# Patient Record
Sex: FEMALE | Race: WHITE | Employment: FULL TIME | URBAN - METROPOLITAN AREA
[De-identification: names, ages, dates, MRNs, and addresses within clinical notes are randomized per-mention and may not be internally consistent; named-entity substitution may affect disease eponyms.]

---

## 2017-06-02 RX ORDER — TRIAMCINOLONE ACETONIDE 1 MG/G
CREAM TOPICAL
Qty: 15 G | Refills: 0 | Status: SHIPPED | OUTPATIENT
Start: 2017-06-02 | End: 2017-09-13 | Stop reason: ALTCHOICE

## 2017-09-13 ENCOUNTER — OFFICE VISIT (OUTPATIENT)
Dept: INTERNAL MEDICINE CLINIC | Age: 47
End: 2017-09-13

## 2017-09-13 VITALS
HEART RATE: 95 BPM | SYSTOLIC BLOOD PRESSURE: 126 MMHG | DIASTOLIC BLOOD PRESSURE: 72 MMHG | OXYGEN SATURATION: 99 % | RESPIRATION RATE: 16 BRPM | HEIGHT: 66 IN | WEIGHT: 223 LBS | BODY MASS INDEX: 35.84 KG/M2

## 2017-09-13 DIAGNOSIS — L23.7 POISON IVY DERMATITIS: Primary | ICD-10-CM

## 2017-09-13 PROCEDURE — 99212 OFFICE O/P EST SF 10 MIN: CPT | Performed by: INTERNAL MEDICINE

## 2017-09-13 RX ORDER — BETAMETHASONE DIPROPIONATE 0.05 %
OINTMENT (GRAM) TOPICAL
Qty: 15 G | Refills: 0 | Status: SHIPPED | OUTPATIENT
Start: 2017-09-13 | End: 2018-05-31 | Stop reason: ALTCHOICE

## 2017-11-20 RX ORDER — TRIAMCINOLONE ACETONIDE 1 MG/G
CREAM TOPICAL
Qty: 15 G | Refills: 1 | Status: SHIPPED | OUTPATIENT
Start: 2017-11-20 | End: 2018-05-31 | Stop reason: SDUPTHER

## 2018-03-05 ENCOUNTER — HOSPITAL ENCOUNTER (OUTPATIENT)
Dept: WOMENS IMAGING | Age: 48
Discharge: HOME | End: 2018-03-05
Attending: INTERNAL MEDICINE | Admitting: INTERNAL MEDICINE

## 2018-03-05 DIAGNOSIS — Z12.31 VISIT FOR SCREENING MAMMOGRAM: ICD-10-CM

## 2018-05-31 ENCOUNTER — OFFICE VISIT (OUTPATIENT)
Dept: INTERNAL MEDICINE CLINIC | Age: 48
End: 2018-05-31

## 2018-05-31 VITALS
SYSTOLIC BLOOD PRESSURE: 116 MMHG | RESPIRATION RATE: 16 BRPM | WEIGHT: 233 LBS | HEIGHT: 66 IN | BODY MASS INDEX: 37.45 KG/M2 | DIASTOLIC BLOOD PRESSURE: 72 MMHG | HEART RATE: 80 BPM

## 2018-05-31 DIAGNOSIS — M79.632 LEFT FOREARM PAIN: ICD-10-CM

## 2018-05-31 DIAGNOSIS — M54.16 LUMBAR RADICULOPATHY: ICD-10-CM

## 2018-05-31 DIAGNOSIS — M25.512 ACUTE PAIN OF LEFT SHOULDER: Primary | ICD-10-CM

## 2018-05-31 PROCEDURE — 99213 OFFICE O/P EST LOW 20 MIN: CPT | Performed by: INTERNAL MEDICINE

## 2018-05-31 RX ORDER — TRIAMCINOLONE ACETONIDE 1 MG/G
CREAM TOPICAL
Qty: 15 G | Refills: 2 | Status: SHIPPED | OUTPATIENT
Start: 2018-05-31 | End: 2018-08-06 | Stop reason: SDUPTHER

## 2018-05-31 RX ORDER — MELOXICAM 15 MG/1
15 TABLET ORAL DAILY
Qty: 30 TABLET | Refills: 3 | Status: SHIPPED | OUTPATIENT
Start: 2018-05-31 | End: 2018-07-19 | Stop reason: ALTCHOICE

## 2018-05-31 ASSESSMENT — ENCOUNTER SYMPTOMS: BACK PAIN: 0

## 2018-06-05 ENCOUNTER — OFFICE VISIT (OUTPATIENT)
Dept: ORTHOPEDIC SURGERY | Age: 48
End: 2018-06-05

## 2018-06-05 VITALS — WEIGHT: 230 LBS | HEIGHT: 65 IN | BODY MASS INDEX: 38.32 KG/M2

## 2018-06-05 DIAGNOSIS — M19.012 DJD OF LEFT AC (ACROMIOCLAVICULAR) JOINT: ICD-10-CM

## 2018-06-05 DIAGNOSIS — M75.22 BICEPS TENDONITIS ON LEFT: ICD-10-CM

## 2018-06-05 DIAGNOSIS — M25.512 ACUTE PAIN OF LEFT SHOULDER: Primary | ICD-10-CM

## 2018-06-05 PROCEDURE — 99243 OFF/OP CNSLTJ NEW/EST LOW 30: CPT | Performed by: FAMILY MEDICINE

## 2018-06-05 PROCEDURE — 20550 NJX 1 TENDON SHEATH/LIGAMENT: CPT | Performed by: FAMILY MEDICINE

## 2018-06-05 RX ORDER — MELOXICAM 15 MG/1
15 TABLET ORAL DAILY
Qty: 30 TABLET | Refills: 3 | Status: SHIPPED | OUTPATIENT
Start: 2018-06-05 | End: 2018-12-19 | Stop reason: SDUPTHER

## 2018-06-12 ENCOUNTER — HOSPITAL ENCOUNTER (OUTPATIENT)
Dept: OTHER | Age: 48
Discharge: HOME | End: 2018-06-30
Attending: FAMILY MEDICINE | Admitting: FAMILY MEDICINE

## 2018-06-12 NOTE — PLAN OF CARE
[]Rheumatoid arthritis (M05.9)  []Osteoarthritis (M19.91)   Cardiovascular conditions   []Hypertension (I10)  []Hyperlipidemia (E78.5)  []Angina pectoris (I20)  []Atherosclerosis (I70)   Musculoskeletal conditions   []Disc pathology   []Congenital spine pathologies   []Prior surgical intervention  []Osteoporosis (M81.8)  []Osteopenia (M85.8)   Endocrine conditions   []Hypothyroid (E03.9)  []Hyperthyroid Gastrointestinal conditions   []Constipation (U92.64)   Metabolic conditions   []Morbid obesity (E66.01)  []Diabetes type 1(E10.65) or 2 (E11.65)   []Neuropathy (G60.9)     Pulmonary conditions   []Asthma (J45)  []Coughing   []COPD (J44.9)   Psychological Disorders  []Anxiety (F41.9)  []Depression (F32.9)   []Other:   []Other:          Barriers to/and or personal factors that will affect rehab potential:              []Age  []Sex              []Motivation/Lack of Motivation                        []Co-Morbidities              []Cognitive Function, education/learning barriers              []Environmental, home barriers              []profession/work barriers  []past PT/medical experience  []other:  Justification: Pt is a 50year old female with no personal factors that will affect rehab potential.     Falls Risk Assessment (30 days):  [x] Falls Risk assessed and no intervention required. [] Falls Risk assessed and Patient requires intervention due to being higher risk   TUG score (>12s at risk):     [] Falls education provided, including       G-Codes:  PT G-Codes  Functional Assessment Tool Used: UEFI  Score: 9% limitation  Functional Limitation: Carrying, moving and handling objects  Carrying, Moving and Handling Objects Current Status ():  At least 1 percent but less than 20 percent impaired, limited or restricted  Carrying, Moving and Handling Objects Goal Status (): 0 percent impaired, limited or restricted    ASSESSMENT:   Functional Impairments   []Noted spinal or UE joint hypomobility   []Noted spinal or UE joint hypermobility   [x]Decreased UE functional ROM   [x]Decreased UE functional strength   []Abnormal reflexes/sensation/myotomal/dermatomal deficits   [x]Decreased RC/scapular/core strength and neuromuscular control   []other:      Functional Activity Limitations (from functional questionnaire and intake)   []Reduced ability to tolerate prolonged functional positions   []Reduced ability or difficulty with changes of positions or transfers between positions   []Reduced ability to maintain good posture and demonstrate good body mechanics with sitting, bending, and lifting   [x] Reduced ability or tolerance with driving and/or computer work   [x]Reduced ability to sleep   [x]Reduced ability to perform lifting, reaching, carrying tasks   []Reduced ability to tolerate impact through UE   []Reduced ability to reach behind back   []Reduced ability to  or hold objects   []Reduced ability to throw or toss an object   []other:      Participation Restrictions   [x]Reduced participation in self care activities   [x]Reduced participation in home management activities   []Reduced participation in work activities   []Reduced participation in social activities. []Reduced participation in sport / recreational activities. Classification:   []Signs/symptoms consistent with post-surgical status including decreased ROM, strength and function.   []Signs/symptoms consistent with joint sprain/strain   []Signs/symptoms consistent with shoulder impingement   [x]Signs/symptoms consistent with shoulder/elbow/wrist tendinopathy   []Signs/symptoms consistent with Rotator cuff tear   []Signs/symptoms consistent with labral tear   []Signs/symptoms consistent with postural dysfunction    []Signs/symptoms consistent with Glenohumeral IR Deficit - <45 degrees   []Signs/symptoms consistent with facet dysfunction of cervical/thoracic spine    []Signs/symptoms consistent with pathology which may benefit from Dry needling []other:     Prognosis/Rehab Potential:      [x]Excellent   [x]Good    []Fair   []Poor    Tolerance of evaluation/treatment:    [x]Excellent   [x]Good    []Fair   []Poor    Physical Therapy Evaluation Complexity Justification  [x] A history of present problem with:  [x] no personal factors and/or comorbidities that impact the plan of care;  []1-2 personal factors and/or comorbidities that impact the plan of care  []3 personal factors and/or comorbidities that impact the plan of care  [x] An examination of body systems using standardized tests and measures addressing any of the following: body structures and functions (impairments), activity limitations, and/or participation restrictions;:  [] a total of 1-2 or more elements   [] a total of 3 or more elements   [x] a total of 4 or more elements   [x] A clinical presentation with:  [x] stable and/or uncomplicated characteristics   [] evolving clinical presentation with changing characteristics  [] unstable and unpredictable characteristics;   [x] Clinical decision making of [x] low, [] moderate, [] high complexity using standardized patient assessment instrument and/or measurable assessment of functional outcome. [x] EVAL (LOW) 63379 (typically 20 minutes face-to-face)  [] EVAL (MOD) 68689 (typically 30 minutes face-to-face)  [] EVAL (HIGH) 27937 (typically 45 minutes face-to-face)  [] RE-EVAL       PLAN:  Frequency/Duration:  1-2 days per week for 4-6 Weeks:  INTERVENTIONS:  [x] Therapeutic exercise including: strength training, ROM, for Upper extremity and core   [x]  NMR activation and proprioception for UE, scap and Core   [x] Manual therapy as indicated for shoulder, scapula and spine to include: Dry Needling/IASTM, STM, PROM, Gr I-IV mobilizations, manipulation.    [x] Modalities as needed that may include: thermal agents, E-stim, Biofeedback, US, iontophoresis as indicated  [x] Patient education on joint protection, postural re-education, activity

## 2018-06-12 NOTE — FLOWSHEET NOTE
Patient will demonstrate an increase in left shoulder strength (flex, ABD, and ER) to 4+/5 and scapular lower trap to 4+/5 to allow for proper functional mobility as indicated by patients Functional Deficits. 4. Patient will return to ADLs and sleeping without increased symptoms or restriction. Progression Towards Functional goals:  [] Patient is progressing as expected towards functional goals listed. [] Progression is slowed due to complexities listed. [] Progression has been slowed due to co-morbidities. [x] Plan just implemented, too soon to assess goals progression  [] Other:     ASSESSMENT:  See eval 6/12    Treatment/Activity Tolerance:  [x] Patient tolerated treatment well [] Patient limited by fatique  [] Patient limited by pain  [] Patient limited by other medical complications  [] Other: Pt tolerated the treatment well, was beginning to fatigue towards the end of today's exercises. The pt was eager to participate in therapy and cooperative throughout the tx. 6/12    Patient education: Patient education on PT and plan of care including diagnosis, prognosis, treatment goals and options. Patient agrees with discussed POC and treatment and is aware of rehab process.  6/12    Prognosis: [x] Good [] Fair  [] Poor    Patient Requires Follow-up: [x] Yes  [] No    PLAN: See eval 6/12  [] Continue per plan of care [] Alter current plan (see comments)  [x] Plan of care initiated [] Hold pending MD visit [] Discharge    Electronically signed by: Francia South PT, DPT

## 2018-06-14 ENCOUNTER — HOSPITAL ENCOUNTER (OUTPATIENT)
Dept: PHYSICAL THERAPY | Age: 48
Discharge: HOME | End: 2018-06-15
Admitting: FAMILY MEDICINE

## 2018-06-14 NOTE — FLOWSHEET NOTE
reaching, carrying, lifting, house/yardwork, driving/computer work  [] (12188) Reviewed/Progressed HEP activities related to improving balance, coordination, kinesthetic sense, posture, motor skill, proprioception of scapular, scapulothoracic and UE control with self care, reaching, carrying, lifting, house/yardwork, driving/computer work      Manual Treatments:  PROM / STM / Oscillations-Mobs:  G-I, II, III, IV (PA's, Inf., Post.)  [] ([id number]) Provided manual therapy to mobilize soft tissue/joints of cervical/CT, scapular GHJ and UE for the purpose of modulating pain, promoting relaxation,  increasing ROM, reducing/eliminating soft tissue swelling/inflammation/restriction, improving soft tissue extensibility and allowing for proper ROM for normal function with self care, reaching, carrying, lifting, house/yardwork, driving/computer work    Modalities: Ice to go 6/14    Charges:  Timed Code Treatment Minutes: 32'    Total Treatment Minutes: 31'       [] EVAL (LOW) 32067 (typically 20 minutes face-to-face)  [] EVAL (MOD) 52566 (typically 30 minutes face-to-face)  [] EVAL (HIGH) 68305 (typically 45 minutes face-to-face)  [] RE-EVAL     [x] UP(64018) x  1   [] IONTO  [] NMR (83875) x      [] VASO  [] Manual (25907) x       [] Other:  [x] TA x  1    [] Mech Traction (16839)  [] ES(attended) (08280)      [] ES (un) (84021):     GOALS:  Patient stated goal: reaching across body     Therapist goals for Patient:   Short Term Goals: To be achieved in: 2 weeks  1. Independent in HEP and progression per patient tolerance, in order to prevent re-injury. 2. Patient will have a decrease in pain to facilitate improvement in movement, function, and ADLs as indicated by Functional Deficits. Long Term Goals: To be achieved in: 4-6 weeks  1. Disability index score of 4% or less for the UEFS to assist with reaching prior level of function.    2. Patient will demonstrate increased left shoulder flex AROM to greater than or equal to

## 2018-06-18 ENCOUNTER — HOSPITAL ENCOUNTER (OUTPATIENT)
Dept: PHYSICAL THERAPY | Age: 48
Discharge: HOME | End: 2018-06-19
Admitting: FAMILY MEDICINE

## 2018-06-18 NOTE — FLOWSHEET NOTE
[address] [address]  Phone: [phone]   HDP: [phone]    Physical Therapy Daily Treatment Note  Date:  2018    Patient Name:  Caridad Jett    :  1970  MRN: [id number]  Restrictions/Precautions:    Medical/Treatment Diagnosis Information:  Diagnosis: Acute left shoulder pain - M25.512; Biceps tendonitis on left - M75.22; DJD of left AC (acromioclavicular) joint - M19.012  Treatment Diagnosis: Decreased ROM; Decreased strength;Decreased endurance; Insurance/Certification information:  PT Insurance Information: Diana  Physician Information:  Referring Practitioner: Dr. Kenzie Madison of care signed (Y/N):  Y    Date of Patient follow up with Physician:     G-Code (if applicable):      Date G-Code Applied:  18  PT G-Codes  Functional Assessment Tool Used: UEFI  Score: 9% limitation  Functional Limitation: Carrying, moving and handling objects  Carrying, Moving and Handling Objects Current Status (): At least 1 percent but less than 20 percent impaired, limited or restricted  Carrying, Moving and Handling Objects Goal Status (): 0 percent impaired, limited or restricted    Progress Note: []  Yes  [x]  No  Next due by: 10 July 2018      Latex Allergy:  [x]NO      []YES  Preferred Language for Healthcare:   [x]English       []other:    Visit # Insurance Allowable   3 30     Pain level:  0 /10     SUBJECTIVE:  Pt states she is feeling good. She states she has no pain today or over the weekend.       OBJECTIVE:   Observation:   Test measurements:      RESTRICTIONS/PRECAUTIONS: None     Exercises/Interventions:   Exercises:  Exercise/Equipment Resistance/Repetitions Other comments   Stretching/PROM     Wand 10 sec x 10 ER Added    Table Slides     UE [place]     Pulleys 10 sec x 10 flex/abd Added    Pendulum          Isometrics     Scapular Retraction 10 sec x 10 Added  driving/computer work  [] (37546) Reviewed/Progressed HEP activities related to improving balance, coordination, kinesthetic sense, posture, motor skill, proprioception of scapular, scapulothoracic and UE control with self care, reaching, carrying, lifting, house/yardwork, driving/computer work      Manual Treatments:  PROM / STM / Oscillations-Mobs:  G-I, II, III, IV (PA's, Inf., Post.)  [] ([id number]) Provided manual therapy to mobilize soft tissue/joints of cervical/CT, scapular GHJ and UE for the purpose of modulating pain, promoting relaxation,  increasing ROM, reducing/eliminating soft tissue swelling/inflammation/restriction, improving soft tissue extensibility and allowing for proper ROM for normal function with self care, reaching, carrying, lifting, house/yardwork, driving/computer work    Modalities: Ice to go 6/18    Charges:  Timed Code Treatment Minutes: 30'    Total Treatment Minutes: 28'         [] EVAL (LOW) 32434 (typically 20 minutes face-to-face)  [] EVAL (MOD) 33039 (typically 30 minutes face-to-face)  [] EVAL (HIGH) 73377 (typically 45 minutes face-to-face)  [] RE-EVAL     [x] UX(32894) x  1   [] IONTO  [x] NMR (01436) x  1   [] VASO  [] Manual (89989) x       [] Other:  [] TA x       [] Mech Traction (73812)  [] ES(attended) (14055)      [] ES (un) (96152):     GOALS:  Patient stated goal: reaching across body     Therapist goals for Patient:   Short Term Goals: To be achieved in: 2 weeks  1. Independent in HEP and progression per patient tolerance, in order to prevent re-injury. 2. Patient will have a decrease in pain to facilitate improvement in movement, function, and ADLs as indicated by Functional Deficits. Long Term Goals: To be achieved in: 4-6 weeks  1. Disability index score of 4% or less for the UEFS to assist with reaching prior level of function.    2. Patient will demonstrate increased left shoulder flex AROM to greater than or equal to 175 deg, ABD AROM to greater than or equal to 165 deg, and ER AROM to greater than or equal to 90 deg to allow for proper joint functioning as indicated by patients Functional Deficits. 3. Patient will demonstrate an increase in left shoulder strength (flex, ABD, and ER) to 4+/5 and scapular lower trap to 4+/5 to allow for proper functional mobility as indicated by patients Functional Deficits. 4. Patient will return to ADLs and sleeping without increased symptoms or restriction. Progression Towards Functional goals:  [] Patient is progressing as expected towards functional goals listed. [] Progression is slowed due to complexities listed. [] Progression has been slowed due to co-morbidities. [x] Plan just implemented, too soon to assess goals progression  [] Other:     ASSESSMENT:      Treatment/Activity Tolerance:  [x] Patient tolerated treatment well [] Patient limited by fatique  [] Patient limited by pain  [] Patient limited by other medical complications  [] Other: Pt tolerated the session well. She demonstrated increased strength with prone Ts, noted by increased resistance. She reported muscular fatigue with the addition of ball on the wall. 6/18    Patient education: Patient education on PT and plan of care including diagnosis, prognosis, treatment goals and options. Patient agrees with discussed POC and treatment and is aware of rehab process.  6/12    Prognosis: [x] Good [] Fair  [] Poor    Patient Requires Follow-up: [x] Yes  [] No    PLAN:   [x] Continue per plan of care [] Alter current plan (see comments)  [] Plan of care initiated [] Hold pending MD visit [] Discharge    Electronically signed by: Lynn Olivas PT, DPT

## 2018-06-21 ENCOUNTER — HOSPITAL ENCOUNTER (OUTPATIENT)
Dept: PHYSICAL THERAPY | Age: 48
Discharge: HOME | End: 2018-06-22
Admitting: FAMILY MEDICINE

## 2018-06-21 NOTE — FLOWSHEET NOTE
[address] [facility] [place] [address]  Phone: [phone]   TYP: [phone]    Physical Therapy Daily Treatment Note  Date:  2018    Patient Name:  Sudeep Valdes    :  1970  MRN: [id number]  Restrictions/Precautions:    Medical/Treatment Diagnosis Information:  Diagnosis: Acute left shoulder pain - M25.512; Biceps tendonitis on left - M75.22; DJD of left AC (acromioclavicular) joint - M19.012  Treatment Diagnosis: Decreased ROM; Decreased strength;Decreased endurance; Insurance/Certification information:  PT Insurance Information: Diana  Physician Information:  Referring Practitioner: Dr. Mercer Mood of care signed (Y/N):  Y    Date of Patient follow up with Physician:     G-Code (if applicable):      Date G-Code Applied:  18  PT G-Codes  Functional Assessment Tool Used: UEFI  Score: 9% limitation  Functional Limitation: Carrying, moving and handling objects  Carrying, Moving and Handling Objects Current Status ():  At least 1 percent but less than 20 percent impaired, limited or restricted  Carrying, Moving and Handling Objects Goal Status (): 0 percent impaired, limited or restricted    Progress Note: []  Yes  [x]  No  Next due by: 10 July 2018      Latex Allergy:  [x]NO      []YES  Preferred Language for Healthcare:   [x]English       []other:    Visit # Insurance Allowable   4 30     Pain level:  0 /10     SUBJECTIVE:  Pt states she was a little sore after last session, but is feeling good now.       OBJECTIVE:   Observation:   Test measurements:      RESTRICTIONS/PRECAUTIONS: None     Exercises/Interventions:   Exercises:  Exercise/Equipment Resistance/Repetitions Other comments   Bike 5'  Added    Stretching/PROM     Wand 10 sec x 10 ER Added    Table Slides     UE [place]     Pulleys 10 sec x 10 flex/abd Added    Pendulum          Isometrics     Scapular Retraction 10 sec x 10 with self care, reaching, carrying, lifting, house/yardwork, driving/computer work  [] (41286) Reviewed/Progressed HEP activities related to improving balance, coordination, kinesthetic sense, posture, motor skill, proprioception of scapular, scapulothoracic and UE control with self care, reaching, carrying, lifting, house/yardwork, driving/computer work      Manual Treatments:  PROM / STM / Oscillations-Mobs:  G-I, II, III, IV (PA's, Inf., Post.)  [] (28087) Provided manual therapy to mobilize soft tissue/joints of cervical/CT, scapular GHJ and UE for the purpose of modulating pain, promoting relaxation,  increasing ROM, reducing/eliminating soft tissue swelling/inflammation/restriction, improving soft tissue extensibility and allowing for proper ROM for normal function with self care, reaching, carrying, lifting, house/yardwork, driving/computer work    Modalities:CP - 15'  6/21    Charges:  Timed Code Treatment Minutes: 40'     Total Treatment Minutes: 59'        [] EVAL (LOW) 23309 (typically 20 minutes face-to-face)  [] EVAL (MOD) 39591 (typically 30 minutes face-to-face)  [] EVAL (HIGH) 16835 (typically 45 minutes face-to-face)  [] RE-EVAL     [x] EQ(70091) x  1   [] IONTO  [x] NMR (01322) x  1   [] VASO  [] Manual ([id number]) x       [] Other:  [x] TA x  1    [] Mech Traction (85983)  [] ES(attended) (09128)      [] ES (un) (49685):     GOALS:  Patient stated goal: reaching across body     Therapist goals for Patient:   Short Term Goals: To be achieved in: 2 weeks  1. Independent in HEP and progression per patient tolerance, in order to prevent re-injury. 2. Patient will have a decrease in pain to facilitate improvement in movement, function, and ADLs as indicated by Functional Deficits. Long Term Goals: To be achieved in: 4-6 weeks  1. Disability index score of 4% or less for the UEFS to assist with reaching prior level of function.    2. Patient will demonstrate increased left shoulder flex AROM to greater

## 2018-06-26 ENCOUNTER — OFFICE VISIT (OUTPATIENT)
Dept: ORTHOPEDIC SURGERY | Age: 48
End: 2018-06-26

## 2018-06-26 VITALS
DIASTOLIC BLOOD PRESSURE: 83 MMHG | BODY MASS INDEX: 39.27 KG/M2 | SYSTOLIC BLOOD PRESSURE: 131 MMHG | WEIGHT: 230 LBS | HEIGHT: 64 IN | HEART RATE: 76 BPM

## 2018-06-26 DIAGNOSIS — M75.22 BICEPS TENDONITIS ON LEFT: ICD-10-CM

## 2018-06-26 DIAGNOSIS — M25.512 ACUTE PAIN OF LEFT SHOULDER: ICD-10-CM

## 2018-06-26 DIAGNOSIS — M19.012 DJD OF LEFT AC (ACROMIOCLAVICULAR) JOINT: Primary | ICD-10-CM

## 2018-06-26 PROCEDURE — 99213 OFFICE O/P EST LOW 20 MIN: CPT | Performed by: FAMILY MEDICINE

## 2018-06-27 ENCOUNTER — HOSPITAL ENCOUNTER (OUTPATIENT)
Dept: PHYSICAL THERAPY | Age: 48
Discharge: HOME | End: 2018-06-28
Admitting: FAMILY MEDICINE

## 2018-06-27 NOTE — FLOWSHEET NOTE
[address] [address]  Phone: [phone]   VDG: [phone]    Physical Therapy Daily Treatment Note  Date:  2018    Patient Name:  Erinn Cardenas    :  1970  MRN: [id number]  Restrictions/Precautions:    Medical/Treatment Diagnosis Information:  Diagnosis: Acute left shoulder pain - M25.512; Biceps tendonitis on left - M75.22; DJD of left AC (acromioclavicular) joint - M19.012  Treatment Diagnosis: Decreased ROM; Decreased strength;Decreased endurance; Insurance/Certification information:  PT Insurance Information: Diana  Physician Information:  Referring Practitioner: Dr. Olivia Hull of care signed (Y/N):  Y    Date of Patient follow up with Physician:     G-Code (if applicable):      Date G-Code Applied:  18  PT G-Codes  Functional Assessment Tool Used: UEFI  Score: 9% limitation  Functional Limitation: Carrying, moving and handling objects  Carrying, Moving and Handling Objects Current Status (): At least 1 percent but less than 20 percent impaired, limited or restricted  Carrying, Moving and Handling Objects Goal Status (): 0 percent impaired, limited or restricted    Progress Note: []  Yes  [x]  No  Next due by: 10 July 2018      Latex Allergy:  [x]NO      []YES  Preferred Language for Healthcare:   [x]English       []other:    Visit # Insurance Allowable   5 30     Pain level:  0 /10     SUBJECTIVE:  Pt states she has no pain. HEP is going well.  She saw the MD yesterday, who recommended a few more PT visits and then D/C to HEP.       OBJECTIVE:   Observation:   Test measurements:      RESTRICTIONS/PRECAUTIONS: None     Exercises/Interventions:   Exercises:  Exercise/Equipment Resistance/Repetitions Other comments   Bike 5'  Added    Stretching/PROM     Wand 10 sec x 10 ER Added    Table Slides     UE [place]     Pulleys 10 sec x 10 flex/abd Added    Pendulum endurance, ROM of scapular, scapulothoracic and UE control with self care, reaching, carrying, lifting, house/yardwork, driving/computer work  [] (25680) Reviewed/Progressed HEP activities related to improving balance, coordination, kinesthetic sense, posture, motor skill, proprioception of scapular, scapulothoracic and UE control with self care, reaching, carrying, lifting, house/yardwork, driving/computer work      Manual Treatments:  PROM / STM / Oscillations-Mobs:  G-I, II, III, IV (PA's, Inf., Post.)  [] (85425) Provided manual therapy to mobilize soft tissue/joints of cervical/CT, scapular GHJ and UE for the purpose of modulating pain, promoting relaxation,  increasing ROM, reducing/eliminating soft tissue swelling/inflammation/restriction, improving soft tissue extensibility and allowing for proper ROM for normal function with self care, reaching, carrying, lifting, house/yardwork, driving/computer work    Modalities:CP - 15'  6/27    Charges:  Timed Code Treatment Minutes: 45'    Total Treatment Minutes: 75'       [] EVAL (LOW) 41868 (typically 20 minutes face-to-face)  [] EVAL (MOD) 76883 (typically 30 minutes face-to-face)  [] EVAL (HIGH) 423 8935 (typically 45 minutes face-to-face)  [] RE-EVAL     [x] ZP(63004) x  1   [] IONTO  [x] NMR (44442) x  1   [] VASO  [] Manual ([id number]) x       [] Other:  [x] TA x  1    [] [facility] T[facility] (14159)  [] ES(attended) (07506)      [] ES (un) (33258):     GOALS:  Patient stated goal: reaching across body     Therapist goals for Patient:   Short Term Goals: To be achieved in: 2 weeks  1. Independent in HEP and progression per patient tolerance, in order to prevent re-injury. 2. Patient will have a decrease in pain to facilitate improvement in movement, function, and ADLs as indicated by Functional Deficits. Long Term Goals: To be achieved in: 4-6 weeks  1. Disability index score of 4% or less for the UEFS to assist with reaching prior level of function.    2. Patient will demonstrate increased left shoulder flex AROM to greater than or equal to 175 deg, ABD AROM to greater than or equal to 165 deg, and ER AROM to greater than or equal to 90 deg to allow for proper joint functioning as indicated by patients Functional Deficits. 3. Patient will demonstrate an increase in left shoulder strength (flex, ABD, and ER) to 4+/5 and scapular lower trap to 4+/5 to allow for proper functional mobility as indicated by patients Functional Deficits. 4. Patient will return to ADLs and sleeping without increased symptoms or restriction. Progression Towards Functional goals:  [x] Patient is progressing as expected towards functional goals listed. [] Progression is slowed due to complexities listed. [] Progression has been slowed due to co-morbidities. [] Plan just implemented, too soon to assess goals progression  [] Other:     ASSESSMENT:      Treatment/Activity Tolerance:  [x] Patient tolerated treatment well [] Patient limited by fatique  [] Patient limited by pain  [] Patient limited by other medical complications  [] Other: Pt tolerated the session well, reporting increased muscular fatigue. She genie the addition of resisted PNF pattern pain free. 6/27    Patient education: Patient education on PT and plan of care including diagnosis, prognosis, treatment goals and options. Patient agrees with discussed POC and treatment and is aware of rehab process.  6/12    Prognosis: [x] Good [] Fair  [] Poor    Patient Requires Follow-up: [x] Yes  [] No    PLAN:   [x] Continue per plan of care [] Alter current plan (see comments)  [] Plan of care initiated [] Hold pending MD visit [] Discharge    Electronically signed by: Lg Easton PT, DPT

## 2018-07-01 ENCOUNTER — HOSPITAL ENCOUNTER (OUTPATIENT)
Dept: OTHER | Age: 48
Discharge: HOME | End: 2018-07-31
Attending: FAMILY MEDICINE | Admitting: FAMILY MEDICINE

## 2018-07-03 ENCOUNTER — HOSPITAL ENCOUNTER (OUTPATIENT)
Dept: PHYSICAL THERAPY | Age: 48
Discharge: HOME | End: 2018-07-04
Admitting: FAMILY MEDICINE

## 2018-07-11 ENCOUNTER — HOSPITAL ENCOUNTER (OUTPATIENT)
Dept: PHYSICAL THERAPY | Age: 48
Discharge: HOME | End: 2018-07-12
Admitting: FAMILY MEDICINE

## 2018-07-11 NOTE — DISCHARGE SUMMARY
M19.012  Treatment Diagnosis: Decreased ROM; Decreased strength;Decreased endurance; Insurance/Certification information:  PT Insurance Information: Diana  Physician Information:  Referring Practitioner: Dr. Pam Liz of care signed (Y/N):  Y    Date of Patient follow up with Physician: 6/26    G-Code (if applicable):      Date G-Code Applied:  7/11/18  PT G-Codes  Functional Assessment Tool Used: UEFi  Score: 0% limitation  Functional Limitation: Carrying, moving and handling objects  Carrying, Moving and Handling Objects Current Status (): 0 percent impaired, limited or restricted  Carrying, Moving and Handling Objects Goal Status (): 0 percent impaired, limited or restricted  Carrying, Moving and Handling Objects Discharge Status (): 0 percent impaired, limited or restricted     Progress Note: [x]  Yes  []  No    Latex Allergy:  [x]NO      []YES  Preferred Language for Healthcare:   [x]English       []other:    Visit # Insurance Allowable   7 30     Pain level: 0 /10 7/11    SUBJECTIVE:  Pt states she has no shoulder pain. No problems at home. She states she was able to vacuum yesterday pain free. She states getting dressed is no longer a problem. Pushing up from a chair is no longer painful.      7/11    OBJECTIVE: 7/11  Observation:   Test measurements:            ROM AROM  Comment     L R     Flexion 178 176     Abduction 178 165     ER 90 105     IR 75 85     Other(cervical)                      Strength L R Comment   Flexion 4+ 4+     Abduction 4+ 4+     ER 4 5     IR 5 5     Upper Trap 5 5     Lower Trap 4 4     Mid Trap 4+ 4+     Rhomboids 4+ 4+     Biceps 5 5     Triceps 5 5       Palpation: no TTP      Posture: rounded shoulders        RESTRICTIONS/PRECAUTIONS: None     Exercises/Interventions:   Exercises:  Exercise/Equipment Resistance/Repetitions Other comments   Bike 5'  Added 6/21   Stretching/PROM     Wand Table Slides UE [place] Pulleys Pendulum          Isometrics     Scapular

## 2018-07-19 ENCOUNTER — OFFICE VISIT (OUTPATIENT)
Dept: ORTHOPEDIC SURGERY | Age: 48
End: 2018-07-19

## 2018-07-19 VITALS
WEIGHT: 235 LBS | BODY MASS INDEX: 39.15 KG/M2 | SYSTOLIC BLOOD PRESSURE: 139 MMHG | HEART RATE: 87 BPM | DIASTOLIC BLOOD PRESSURE: 82 MMHG | HEIGHT: 65 IN

## 2018-07-19 DIAGNOSIS — M25.562 LEFT KNEE PAIN, UNSPECIFIED CHRONICITY: Primary | ICD-10-CM

## 2018-07-19 DIAGNOSIS — M54.50 LUMBAR SPINE PAIN: ICD-10-CM

## 2018-07-19 DIAGNOSIS — M17.12 PRIMARY OSTEOARTHRITIS OF LEFT KNEE: ICD-10-CM

## 2018-07-19 DIAGNOSIS — M54.42 ACUTE LEFT-SIDED LOW BACK PAIN WITH LEFT-SIDED SCIATICA: ICD-10-CM

## 2018-07-19 DIAGNOSIS — M51.36 DDD (DEGENERATIVE DISC DISEASE), LUMBAR: ICD-10-CM

## 2018-07-19 DIAGNOSIS — M76.32 ILIOTIBIAL BAND SYNDROME OF LEFT SIDE: ICD-10-CM

## 2018-07-19 DIAGNOSIS — M22.42 CHONDROMALACIA PATELLAE OF LEFT KNEE: ICD-10-CM

## 2018-07-19 PROBLEM — M51.369 DDD (DEGENERATIVE DISC DISEASE), LUMBAR: Status: ACTIVE | Noted: 2018-07-19

## 2018-07-19 PROCEDURE — 99214 OFFICE O/P EST MOD 30 MIN: CPT | Performed by: FAMILY MEDICINE

## 2018-07-19 PROCEDURE — MISCD110 LATERAL STABILIZER: Performed by: FAMILY MEDICINE

## 2018-07-19 RX ORDER — METHYLPREDNISOLONE 4 MG/1
TABLET ORAL
Qty: 21 KIT | Refills: 0 | Status: SHIPPED | OUTPATIENT
Start: 2018-07-19 | End: 2018-08-16 | Stop reason: ALTCHOICE

## 2018-07-19 NOTE — PATIENT INSTRUCTIONS
If you're currently taking an anti-inflammatory such as advil, aleve, ibuprofen, diclofenac, naproxen, meloxicam, celebrex, or nabumetone, please stop. Take Medrol first for 6 days. This is a steroid pack. Flip the package over to the foil side and the directions will tell you to start with 6 pills the first day, 5 pills the second day, etc. Please do not take any other anti-inflammatories with the medrol dose frieda as this can upset your stomach. If something else is needed, you may take extra strength tylenol.      Once you are finished with the medrol, then you may re-start or start your anti-inflammatory: Meloxicam

## 2018-07-19 NOTE — PROGRESS NOTES
distribution. She denies neurogenic bowel or bladder symptoms. Seen today for orthopedic consultation with imaging. Medical History  Patient's medications, allergies, past medical, surgical, social and family histories were reviewed and updated as appropriate. Review of Systems  Relevant review of systems reviewed on 6/5/2018 and available in the patient's chart under the medial tab. Vital Signs  Vitals:    07/19/18 0923   BP: 139/82   Pulse: 87         General Exam:   Constitutional: Patient is adequately groomed with no evidence of malnutrition  DTRs: Deep tendon reflexes are intact  Mental Status: The patient is oriented to time, place and person. The patient's mood and affect are appropriate. Lymphatic: The lymphatic examination bilaterally reveals all areas to be without enlargement or induration. Vascular: Examination reveals no swelling or calf tenderness. Peripheral pulses are palpable and 2+. Neurological: The patient has good coordination. There is no weakness or sensory deficit. Shoulder Examination    Inspection:  There is no high-grade deformity swelling atrophy or palpable spasm. She does have some mild a.c. crepitation. Palpation:  She No longer has any pain at 0 out of 10 with palpation of the biceps tendon anteriorly involving the left shoulder. She is not exhibit any tenderness over the greater tuberosity and a.c. joint. She has no further posterior cuff discomfort. Rang of Motion:  She has reestablished nearly all of her shoulder motion both actively and passively. Strength:  She has improved strength at 4+ to 5 minus out of 5 with super and infraspinatus testing without pain. Subscap 5 out of 5. Special Tests:  Negative drop liftoff testing. No further pain with speed's testing and direct palpation of the biceps tendon. There does not appear to be of evidence of instability and she no longer has pain with impingement testing as well. deformity or injury. Range of motion is unremarkable. There is no gross instability. There are no rashes, ulcerations or lesions. Strength and tone are normal.  Left Upper Extremity: Examination of the left upper extremity does not show any tenderness, deformity or injury. Range of motion is unremarkable. There is no gross instability. There are no rashes, ulcerations or lesions. Strength and tone are normal.  Neck: Examination of the neck does not show any tenderness, deformity or injury. Range of motion is unremarkable. There is no gross instability. There are no rashes, ulcerations or lesions. Strength and tone are normal.         Diagnostic Test Findings:   AP and lateral lumbar spine films were obtained today and show some mild facet arthropathy with suggestion of L5-S1 degenerative disc disease. No high-grade spondylolisthesis or acute osseous injuries identified. Left knee AP and PA weight-bearing sunrise and lateral films were obtained today and do show fairly prominent tilt with underlying osteoarthritic changes to the patellofemoral joint. She doesn't have some mild medial compartment narrowing as well. Assessment:  #1.  3 months status post symptomatic mechanical back pain with lumbar myofascial pain and underlying lumbar degenerative disc disease. #2.  3 months status post symptomatic left knee distal IT band friction syndrome with underlying patellofemoral arthropathy and mild medial compartment osteoarthritis. #3.   markedly improved partially rehabilitated left shoulder posttraumatic biceps tendinitis with improved shoulder pain and a.c. arthropathy with low-grade impingement         Impression:    Encounter Diagnoses   Name Primary?     Left knee pain, unspecified chronicity Yes    Lumbar spine pain     DDD (degenerative disc disease), lumbar     Iliotibial band syndrome of left side     Primary osteoarthritis of left knee     Chondromalacia patellae of left knee     the next several weeks. I would like for her to utilize J brace that she is having some pseudo-buckling in her knee. She will attend physical therapy for her back as well as her left distal IT band. Icing and activity modification was discussed. Her shoulder is doing much better and she will continue her home-based exercises. We will see her back in 3-4 weeks and consider workup for radiculopathy if she is failing to improve but she will contact us in the interim with questions or concerns. Cc: Dr. Kathleen Caldwell        This dictation was performed with a verbal recognition program [facility] ) and it was checked for errors. It is possible that there are still dictated errors within this office note. If so, please bring any errors to my attention for an addendum. All efforts were made to ensure that this office note is accurate.

## 2018-07-23 ENCOUNTER — HOSPITAL ENCOUNTER (OUTPATIENT)
Dept: PHYSICAL THERAPY | Age: 48
Discharge: HOME | End: 2018-07-24
Admitting: FAMILY MEDICINE

## 2018-07-23 NOTE — PLAN OF CARE
away. She states her knee pain is constant. She went to see the MD, who gave her a brace and referred her to PT. CLOF: She states walking and prolonged sitting increases pain. She states laying on the left side increases pain, changing positions helps the hip, but not the knee pain. No pain with driving. She states stairs at home, doesn't increase pain. She states standing will occasionally increase pain. She states occasionally bringing the foot up to put a sock on increases pain.      Relevant Medical History: None   Functional Disability Index/G-Codes:  Modified Oswestry, FABQ    Pain Scale: 2/10 now, 7/10 at worst   Easing factors: brace   Provocative factors: listed above     Type: []Constant   []Intermittent  []Radiating []Localized []other:     Numbness/Tingling: down LLE     Occupation/School:  [employer]      Hobbies: none    Living Status/Prior Level of Function: Independent with ADLs and IADLs    OBJECTIVE:     Standing Exam Normal Abnormal N/A Comments   Toe walk   Normal       Heel Walk Normal      Side bending  Abnormal  Pain with LSB    Pelvic Height Normal       Fwd Bend Normal       Extension Normal       Trendelenburg Normal       SLS/SLS with rotation  Abnormal  Pain on LLE           Seated Exam Normal Abnormal N/A Comments   Pelvic Height Normal       Seated Rotation Normal       Seated flexion Normal       B hip IR Normal              Supine Exam Normal Abnormal N/A Comments   Hip flexion Normal       Abduction Normal       ER Normal       IR Normal       SHANT/Gerardo  Abnormal  Abnormal LLE   Hip scour Normal       SLR Normal       Crossed SLR Normal       SI distraction/compression       Thigh thrust Normal       Hamstring flexibility LLE - lacking 38 deg  RLE - lacking 36 deg    90/90 position + low back pain           Prone Exam Normal Abnormal N/A Comments   Prone hip IR Normal       PA/Spring  Abnormal  Pain L Lumbar    Prone Instability test       Sacral Spring/thrust Normal extension     []symptoms peripheralize with lumbar flexion     []directional preference for extension    []Lateral Shift     []visible frontal plane deviation     []directional preference for lateral translation   [x] \"Manipulation subgroup\"  (3/4 meets manipulation criteria)     []symptoms less than 16 days    [x]FABQ less than 19    [x]Hypomobility of lumbar spine    [x]IR of at least 1 hip >35 degrees    Other Factors to consider:    [x]no symptoms distal to the knee    [x]no red flags and minimal yellow flags    [x]no contraindications to manipulation   [] \"Traction subgroup\"      []signs and symptoms of nerve root compression (radiculopathy)     []unable to centralize distal symptoms with movement    []pain or numbness in the lumbar spine, buttock, and/or LE    []peripheralization with extension movements    []+ SLR or + crossed SLR (symptoms less than 70 degrees)  []Movement Control Approach   []\"Stabilization subgroup\"    []younger age (less than 40)     []greater general flexibility (post-partum, SLR >90)    []abberant movements, painful arc, or [place]'s sign with flex/ext ROM    []positive prone instability test  []Functional Optimization Approach   []\"unspecified subgroup\"    []lower disability scores     []lower fear avoidance scores     []longer duration of symptoms (chronic)    []prior episodes of low back pain    []older age                           [x] Patient history, allergies, meds reviewed. Medical chart reviewed. See intake form. Review Of Systems (ROS):  [x]Performed Review of systems (Integumentary, CardioPulmonary, Neurological) by intake and observation. Intake form has been scanned into medical record. Patient has been instructed to contact their primary care physician regarding ROS issues if not already being addressed at this time.       Co-morbidities/Complexities (which will affect course of rehabilitation):   [x]None           Arthritic conditions   []Rheumatoid arthritis hypomobility   []Noted lumbosacral and/or generalized hypermobility   []Decreased Lumbosacral/hip/LE functional ROM   [x]Decreased core/proximal hip strength and neuromuscular control    [x]Decreased LE functional strength    []Abnormal reflexes/sensation/myotomal/dermatomal deficits  []Reduced balance/proprioceptive control    []other:      Functional Activity Limitations (from functional questionnaire and intake)   [x]Reduced ability to tolerate prolonged functional positions   []Reduced ability or difficulty with changes of positions or transfers between positions   [x]Reduced ability to maintain good posture and demonstrate good body mechanics with sitting, bending, and lifting   [x]Reduced ability to sleep   [] Reduced ability or tolerance with driving and/or computer work   []Reduced ability to perform lifting, reaching, carrying tasks   [x]Reduced ability to squat   []Reduced ability to forward bend   [x]Reduced ability to ambulate prolonged functional periods/distances/surfaces   [x]Reduced ability to ascend/descend stairs   []other:       Participation Restrictions   [x]Reduced participation in self care activities   []Reduced participation in home management activities   []Reduced participation in work activities   []Reduced participation in social activities. []Reduced participation in sport/recreational activities. Classification:   [x]Signs/symptoms consistent with Lumbar instability/stabilization subgroup. [x]Signs/symptoms consistent with Lumbar mobilization/manipulation subgroup, myotomes and dermatomes intact. Meets manipulation criteria.     []Signs/symptoms consistent with Lumbar direction specific/centralization subgroup   []Signs/symptoms consistent with Lumbar traction subgroup     []Signs/symptoms consistent with lumbar facet dysfunction   []Signs/symptoms consistent with lumbar stenosis type dysfunction   []Signs/symptoms consistent with nerve root involvement including myotome & dermatome dysfunction   []Signs/symptoms consistent with post-surgical status including: decreased ROM, strength and function. []signs/symptoms consistent with pathology which may benefit from Dry needling     []other:      Prognosis/Rehab Potential:      [x]Excellent   [x]Good    []Fair   []Poor    Tolerance of evaluation/treatment:    [x]Excellent   [x]Good    []Fair   []Poor    Physical Therapy Evaluation Complexity Justification  [x] A history of present problem with:  [] no personal factors and/or comorbidities that impact the plan of care;  [x]1-2 personal factors and/or comorbidities that impact the plan of care  []3 personal factors and/or comorbidities that impact the plan of care  [x] An examination of body systems using standardized tests and measures addressing any of the following: body structures and functions (impairments), activity limitations, and/or participation restrictions;:  [] a total of 1-2 or more elements   [] a total of 3 or more elements   [x] a total of 4 or more elements   [x] A clinical presentation with:  [x] stable and/or uncomplicated characteristics   [] evolving clinical presentation with changing characteristics  [] unstable and unpredictable characteristics;   [x] Clinical decision making of [x] low, [] moderate, [] high complexity using standardized patient assessment instrument and/or measurable assessment of functional outcome.     [x] EVAL (LOW) 59375 (typically 20 minutes face-to-face)  [] EVAL (MOD) 44081 (typically 30 minutes face-to-face)  [] EVAL (HIGH) 67358 (typically 45 minutes face-to-face)  [] RE-EVAL         PLAN: Begin PT focusing on: proximal hip mobilizations, LB mobs, LB core activation, proximal hip activation, and HEP    Frequency/Duration: 1-2 days per week for 6 Weeks:  Interventions:  [x]  Therapeutic exercise including: strength training, ROM, for LE, Glutes and core   [x]  NMR activation and proprioception for glutes , LE and Core   [x]  Manual therapy

## 2018-07-25 ENCOUNTER — HOSPITAL ENCOUNTER (OUTPATIENT)
Dept: PHYSICAL THERAPY | Age: 48
Discharge: HOME | End: 2018-07-26
Admitting: FAMILY MEDICINE

## 2018-07-25 NOTE — FLOWSHEET NOTE
[address],  [address]  Phone: [phone]   GZD: [phone]      Physical Therapy Daily Treatment Note  Date:  2018    Patient Name:  Eduar Dunham    :  1970  MRN: [id number]  Restrictions/Precautions:    Medical/Treatment Diagnosis Information:  Diagnosis: Acute L sided low bakc pain - M54.42, Lumbar DDD - M51.36, Lumbar spine pain - M54.5; Iliotibial band syndrome of left side - M76.32; Left knee pain - M25.562  Treatment Diagnosis: Decreased ADL status; Decreased strength;Decreased endurance; Insurance/Certification information:  PT Insurance Information: Diana   Physician Information:  Referring Practitioner: Dr. Mu Jean Baptiste of care signed (Y/N): Y    Date of Patient follow up with Physician:     G-Code (if applicable):      Date G-Code Applied:  18  PT G-Codes  Functional Assessment Tool Used: Modified oswestry, FABQ  Score: Modified oswestry - 20%, FABQ W - 12, PA - 11  Functional Limitation: Changing and maintaining body position  Changing and Maintaining Body Position Current Status (): At least 1 percent but less than 20 percent impaired, limited or restricted  Changing and Maintaining Body Position Goal Status (): 0 percent impaired, limited or restricted    Progress Note: []  Yes  [x]  No  Next due by: 2018      Latex Allergy:  [x]NO      []YES  Preferred Language for Healthcare:   [x]English       []other:    Visit # Insurance Allowable   2 30 (7 used for LE)      Pain level:   10 - knee pain      SUBJECTIVE:  She states she has been sore from the HEP. She states she has a little knee pain while walking in. She states since wearing the knee brace, she has not had any numbness or tingling down the LLE. She states she was able to walk around [place] with no numbness and tingling, but did have increased knee pain.  However, she states she did not require too long of a rest break before it felt better. 7/25    OBJECTIVE:   Observation:   Test measurements:      RESTRICTIONS/PRECAUTIONS: none     Exercises/Interventions:   ROM/stretches     SKTC     DKTC     Prayer stretch     Supine HS 30 sec x 3 Added 7/23   Prone quad stretch 30 sec x 3 Added 7/23   Pelvic tilt     Hook lying rotation 10 x 3 Added 7/25   Cat and camel          Strengthening     TA activation 10 sec x 10  Added 7/23   Ta activation with marches 15 x 1 Added 7/25   Supine SLR 10 x 3 Added 7/23   Hip ABD SLR 10 x 3  Added 7/23   Hip ext SLR 10 x 3 Added 7/23   Bridges 10 x 3 Added 7/25        Quadruped alternate LE reaches     Quadruped alternate UE/LE reaches     [employer] heel raises     Sit ups      planks     Tband lat pulls     Tband rows       Manual Intervention             Prone PA      GISTM/STM      Lumbar Manip      SI Manip      Hip belt mobs      Hip LA distraction              Therapeutic Exercise and NMR EXR  [x] (84497) Provided verbal/tactile cueing for activities related to strengthening, flexibility, endurance, ROM  for improvements in proximal hip and core control with self care, mobility, lifting and ambulation.  [] (18158) Provided verbal/tactile cueing for activities related to improving balance, coordination, kinesthetic sense, posture, motor skill, proprioception  to assist with core control in self care, mobility, lifting, and ambulation.      Therapeutic Activities:    [] (98549 or 14635) Provided verbal/tactile cueing for activities related to improving balance, coordination, kinesthetic sense, posture, motor skill, proprioception and motor activation to allow for proper function  with self care and ADLs  [] (47426) Provided training and instruction to the patient for proper core and proximal hip recruitment and positioning with ambulation re-education     Home Exercise Program:    [x] (42392) Reviewed/Progressed HEP activities related to strengthening, flexibility, endurance, ROM of core, proximal hip and LE for functional self-care, mobility, lifting and ambulation   [] (37707) Reviewed/Progressed HEP activities related to improving balance, coordination, kinesthetic sense, posture, motor skill, proprioception of core, proximal hip and LE for self care, mobility, lifting, and ambulation      Manual Treatments:  PROM / STM / Oscillations-Mobs:  G-I, II, III, IV (PA's, Inf., Post.)  [x] (49631) Provided manual therapy to mobilize proximal hip and LS spine soft tissue/joints for the purpose of modulating pain, promoting relaxation,  increasing ROM, reducing/eliminating soft tissue swelling/inflammation/restriction, improving soft tissue extensibility and allowing for proper ROM for normal function with self care, mobility, lifting and ambulation. Manual - bilateral hamstring stretch, quad stretch, and hip flexor stretch; bilateral grade III hip mobilizations in neutral and hip ABD 30 deg - 10' 7/25     Modalities:   CP - 15' 7/23    Charges:  Timed Code Treatment Minutes: 50'    Total Treatment Minutes: 50'       [] EVAL (LOW) 70131 (typically 20 minutes face-to-face)  [] EVAL (MOD) 75913 (typically 30 minutes face-to-face)  [] EVAL (HIGH) 79548 (typically 45 minutes face-to-face)  [] RE-EVAL     [x] EW(31387) x  1   [] IONTO  [x] NMR (77492) x  1   [] VASO  [x] Manual (41929) x  1    [] Other:  [] TA x       [] Mech Traction (69095)  [] ES(attended) (47678)      [] ES (un) (44937):     Goals:  Patient stated goal: reduce pain in knee and cross legs to put socks and shoes on     Therapist goals for Patient:   Short Term Goals: To be achieved in: 2 weeks  1. Independent in HEP and progression per patient tolerance, in order to prevent re-injury. 2. Patient will have a decrease in pain to facilitate improvement in movement, function, and ADLs as indicated by Functional Deficits. Long Term Goals: To be achieved in: 6  weeks  1.  Disability index score of 10% or less for the

## 2018-07-30 ENCOUNTER — HOSPITAL ENCOUNTER (OUTPATIENT)
Dept: PHYSICAL THERAPY | Age: 48
Discharge: HOME | End: 2018-08-31
Admitting: FAMILY MEDICINE

## 2018-07-30 NOTE — FLOWSHEET NOTE
[address] [facility] [place] [address]  Phone: [phone]   D: [phone]      Physical Therapy Daily Treatment Note  Date:  2018    Patient Name:  Jacinta Almendarez    :  1970  MRN: [id number]  Restrictions/Precautions:    Medical/Treatment Diagnosis Information:  Diagnosis: Acute L sided low bakc pain - M54.42, Lumbar DDD - M51.36, Lumbar spine pain - M54.5; Iliotibial band syndrome of left side - M76.32; Left knee pain - M25.562  Treatment Diagnosis: Decreased ADL status; Decreased strength;Decreased endurance; Insurance/Certification information:  PT Insurance Information: Diana   Physician Information:  Referring Practitioner: Dr. Rock Caldera of care signed (Y/N): Y    Date of Patient follow up with Physician:     G-Code (if applicable):      Date G-Code Applied:  18  PT G-Codes  Functional Assessment Tool Used: Modified oswestry, FABQ  Score: Modified oswestry - 20%, FABQ W - 12, PA - 11  Functional Limitation: Changing and maintaining body position  Changing and Maintaining Body Position Current Status (): At least 1 percent but less than 20 percent impaired, limited or restricted  Changing and Maintaining Body Position Goal Status (): 0 percent impaired, limited or restricted    Progress Note: []  Yes  [x]  No  Next due by: 2018      Latex Allergy:  [x]NO      []YES  Preferred Language for Healthcare:   [x]English       []other:    Visit # Insurance Allowable   3 30 (7 used for LE)      Pain level:   2-3 /10 - knee pain, 0/10 - glut      SUBJECTIVE:  She states she has not had any pain in her glut/hip in a while. She states her knee pain was sore over the weekend after walking.       OBJECTIVE:   Observation:   Test measurements:      RESTRICTIONS/PRECAUTIONS: none     Exercises/Interventions:   ROM/stretches     SKTC     DKTC     Prayer stretch     Supine HS 30 sec x 3 Added  related to improving balance, coordination, kinesthetic sense, posture, motor skill, proprioception of core, proximal hip and LE for self care, mobility, lifting, and ambulation      Manual Treatments:  PROM / STM / Oscillations-Mobs:  G-I, II, III, IV (PA's, Inf., Post.)  [x] (19924) Provided manual therapy to mobilize proximal hip and LS spine soft tissue/joints for the purpose of modulating pain, promoting relaxation,  increasing ROM, reducing/eliminating soft tissue swelling/inflammation/restriction, improving soft tissue extensibility and allowing for proper ROM for normal function with self care, mobility, lifting and ambulation. Modalities:   Declined  7/30    Charges:  Timed Code Treatment Minutes: 54'    Total Treatment Minutes: 54'        [] EVAL (LOW) 78129 (typically 20 minutes face-to-face)  [] EVAL (MOD) 63776 (typically 30 minutes face-to-face)  [] EVAL (HIGH) 40693 (typically 45 minutes face-to-face)  [] RE-EVAL     [x] YX(55945) x  2   [] IONTO  [x] NMR (56287) x  1   [] VASO  [] Manual (05352) x       [] Other:  [x] TA x  1    [] Mech Traction (94666)  [] ES(attended) (09122)      [] ES (un) (41072):     Goals:  Patient stated goal: reduce pain in knee and cross legs to put socks and shoes on     Therapist goals for Patient:   Short Term Goals: To be achieved in: 2 weeks  1. Independent in HEP and progression per patient tolerance, in order to prevent re-injury. 2. Patient will have a decrease in pain to facilitate improvement in movement, function, and ADLs as indicated by Functional Deficits. Long Term Goals: To be achieved in: 6  weeks  1. Disability index score of 10% or less for the [place] to assist with reaching prior level of function. 2. Patient will demonstrate increased AROM to WNL, good LS mobility, good hip ROM to allow for proper joint functioning as indicated by patients Functional Deficits.    3. Patient will demonstrate an increase in bilateral hip (flex, ABD, and ext)

## 2018-08-01 ENCOUNTER — HOSPITAL ENCOUNTER (OUTPATIENT)
Dept: PHYSICAL THERAPY | Age: 48
Discharge: HOME | End: 2018-08-02
Admitting: FAMILY MEDICINE

## 2018-08-01 ENCOUNTER — HOSPITAL ENCOUNTER (OUTPATIENT)
Dept: OTHER | Age: 48
Discharge: HOME | End: 2018-08-01
Attending: FAMILY MEDICINE | Admitting: FAMILY MEDICINE

## 2018-08-06 ENCOUNTER — HOSPITAL ENCOUNTER (OUTPATIENT)
Dept: PHYSICAL THERAPY | Age: 48
Discharge: HOME | End: 2018-08-07
Admitting: FAMILY MEDICINE

## 2018-08-06 RX ORDER — TRIAMCINOLONE ACETONIDE 1 MG/G
CREAM TOPICAL
Qty: 15 G | Refills: 2 | Status: SHIPPED | OUTPATIENT
Start: 2018-08-06 | End: 2018-10-22 | Stop reason: SDUPTHER

## 2018-08-06 NOTE — TELEPHONE ENCOUNTER
From: Odalis Biggs  Sent: 8/5/2018 10:21 PM EDT  Subject: Medication Renewal Request    Mati Hernandezranjana.  Niall Soni would like a refill of the following medications:     triamcinolone (KENALOG) 0.1 % cream [MICHELLE Mosley MD]    Preferred pharmacy: [address], [address] [place] [address] 61 [phone] F [phone]

## 2018-08-06 NOTE — FLOWSHEET NOTE
[address] [facility] [place] [address]  Phone: [phone]   GZL: [phone]      Physical Therapy Daily Treatment Note  Date:  2018    Patient Name:  Judy Russell    :  1970  MRN: [id number]  Restrictions/Precautions:    Medical/Treatment Diagnosis Information:  Diagnosis: Acute L sided low bakc pain - M54.42, Lumbar DDD - M51.36, Lumbar spine pain - M54.5; Iliotibial band syndrome of left side - M76.32; Left knee pain - M25.562  Treatment Diagnosis: Decreased ADL status; Decreased strength;Decreased endurance; Insurance/Certification information:  PT Insurance Information: [place]   Physician Information:  Referring Practitioner: Dr. Latha Timmons of care signed (Y/N): Y    Date of Patient follow up with Physician:     G-Code (if applicable):      Date G-Code Applied:  18  PT G-Codes  Functional Assessment Tool Used: Modified oswestry, FABQ  Score: Modified oswestry - 20%, FABQ W - 12, PA - 11  Functional Limitation: Changing and maintaining body position  Changing and Maintaining Body Position Current Status (): At least 1 percent but less than 20 percent impaired, limited or restricted  Changing and Maintaining Body Position Goal Status (): 0 percent impaired, limited or restricted    Progress Note: []  Yes  [x]  No  Next due by: 2018      Latex Allergy:  [x]NO      []YES  Preferred Language for Healthcare:   [x]English       []other:    Visit # Insurance Allowable   5 30 (7 used for LE)      Pain level:   2-3 /10 - knee pain, 0/10 - glut  8/    SUBJECTIVE:  She states she has not had any pain all weekend. She states she walked on the treadmill without her brace and then walked around this afternoon without any pain.         OBJECTIVE:   Observation:   Test measurements:      RESTRICTIONS/PRECAUTIONS: none     Exercises/Interventions:   ROM/stretches     [facility]     Prayer stretch functional self-care, mobility, lifting and ambulation   [] (89931) Reviewed/Progressed HEP activities related to improving balance, coordination, kinesthetic sense, posture, motor skill, proprioception of core, proximal hip and LE for self care, mobility, lifting, and ambulation      Manual Treatments:  PROM / STM / Oscillations-Mobs:  G-I, II, III, IV (PA's, Inf., Post.)  [x] (63935) Provided manual therapy to mobilize proximal hip and LS spine soft tissue/joints for the purpose of modulating pain, promoting relaxation,  increasing ROM, reducing/eliminating soft tissue swelling/inflammation/restriction, improving soft tissue extensibility and allowing for proper ROM for normal function with self care, mobility, lifting and ambulation. Modalities:   Declined  7/30    Charges:  Timed Code Treatment Minutes: 39'    Total Treatment Minutes: 48'        [] EVAL (LOW) 92833 (typically 20 minutes face-to-face)  [] EVAL (MOD) 58984 (typically 30 minutes face-to-face)  [] EVAL (HIGH) 19072 (typically 45 minutes face-to-face)  [] RE-EVAL     [x] TK(56484) x  1   [] IONTO  [x] NMR (97254) x  1   [] VASO  [] Manual (82324) x       [] Other:  [x] TA x  1    [] Mech Traction (85847)  [] ES(attended) (88759)      [] ES (un) (24791):     Goals:  Patient stated goal: reduce pain in knee and cross legs to put socks and shoes on     Therapist goals for Patient:   Short Term Goals: To be achieved in: 2 weeks  1. Independent in HEP and progression per patient tolerance, in order to prevent re-injury. 2. Patient will have a decrease in pain to facilitate improvement in movement, function, and ADLs as indicated by Functional Deficits. Long Term Goals: To be achieved in: 6  weeks  1. Disability index score of 10% or less for the [place] to assist with reaching prior level of function.    2. Patient will demonstrate increased AROM to WNL, good LS mobility, good hip ROM to allow for proper joint functioning as indicated by

## 2018-08-08 ENCOUNTER — HOSPITAL ENCOUNTER (OUTPATIENT)
Dept: PHYSICAL THERAPY | Age: 48
Discharge: HOME | End: 2018-08-09
Admitting: FAMILY MEDICINE

## 2018-08-08 NOTE — FLOWSHEET NOTE
activities related to strengthening, flexibility, endurance, ROM of core, proximal hip and LE for functional self-care, mobility, lifting and ambulation   [] (80217) Reviewed/Progressed HEP activities related to improving balance, coordination, kinesthetic sense, posture, motor skill, proprioception of core, proximal hip and LE for self care, mobility, lifting, and ambulation      Manual Treatments:  PROM / STM / Oscillations-Mobs:  G-I, II, III, IV (PA's, Inf., Post.)  [x] (39523) Provided manual therapy to mobilize proximal hip and LS spine soft tissue/joints for the purpose of modulating pain, promoting relaxation,  increasing ROM, reducing/eliminating soft tissue swelling/inflammation/restriction, improving soft tissue extensibility and allowing for proper ROM for normal function with self care, mobility, lifting and ambulation. Modalities:   Declined  8/8    Charges:  Timed Code Treatment Minutes: 54'    Total Treatment Minutes: 61'        [] EVAL (LOW) 26006 (typically 20 minutes face-to-face)  [] EVAL (MOD) 59507 (typically 30 minutes face-to-face)  [] EVAL (HIGH) 48429 (typically 45 minutes face-to-face)  [] RE-EVAL     [x] OI(08396) x  1   [] IONTO  [x] NMR (66065) x  1   [] VASO  [] Manual (89319) x       [] Other:  [x] TA x  2    [] Mech Traction (88112)  [] ES(attended) (45258)      [] ES (un) (73466):     Goals:  Patient stated goal: reduce pain in knee and cross legs to put socks and shoes on     Therapist goals for Patient:   Short Term Goals: To be achieved in: 2 weeks  1. Independent in HEP and progression per patient tolerance, in order to prevent re-injury. 2. Patient will have a decrease in pain to facilitate improvement in movement, function, and ADLs as indicated by Functional Deficits. Long Term Goals: To be achieved in: 6  weeks  1. Disability index score of 10% or less for the [place] to assist with reaching prior level of function.    2. Patient will demonstrate increased AROM to

## 2018-08-15 ENCOUNTER — HOSPITAL ENCOUNTER (OUTPATIENT)
Dept: PHYSICAL THERAPY | Age: 48
Discharge: HOME | End: 2018-08-16
Admitting: FAMILY MEDICINE

## 2018-08-15 NOTE — FLOWSHEET NOTE
[address] [facility] [place], [place] [address]  Phone: [phone]   RVB: [phone]      Physical Therapy Daily Treatment Note  Date:  8/15/2018    Patient Name:  Anahy Solomon    :  1970  MRN: [id number]  Restrictions/Precautions:    Medical/Treatment Diagnosis Information:  Diagnosis: Acute L sided low bakc pain - M54.42, Lumbar DDD - M51.36, Lumbar spine pain - M54.5; Iliotibial band syndrome of left side - M76.32; Left knee pain - M25.562  Treatment Diagnosis: Decreased ADL status; Decreased strength;Decreased endurance; Insurance/Certification information:  PT Insurance Information: Diana   Physician Information:  Referring Practitioner: Dr. Teresa German of care signed (Y/N): Y    Date of Patient follow up with Physician:     G-Code (if applicable):      Date G-Code Applied:  18  PT G-Codes  Functional Assessment Tool Used: Modified oswestry, FABQ  Score: Modified oswestry - 20%, FABQ W - 12, PA - 11  Functional Limitation: Changing and maintaining body position  Changing and Maintaining Body Position Current Status (): At least 1 percent but less than 20 percent impaired, limited or restricted  Changing and Maintaining Body Position Goal Status (): 0 percent impaired, limited or restricted    Progress Note: []  Yes  [x]  No  Next due by: 2018      Latex Allergy:  [x]NO      []YES  Preferred Language for Healthcare:   [x]English       []other:    Visit # Insurance Allowable   7 30 (7 used for LE)      Pain level:   0 /10 - knee pain, 0/10 - glut  8/15    SUBJECTIVE:  She states she has went back to school and 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 She states she also has a lot of stairs a work and has not had trouble. She states she did forget her brace on Monday and could feel a difference in her knee, but not pain.  She states she notices it most when she goes to lay down at To be achieved in: 6  weeks  1. Disability index score of 10% or less for the [place] to assist with reaching prior level of function. 2. Patient will demonstrate increased AROM to WNL, good LS mobility, good hip ROM to allow for proper joint functioning as indicated by patients Functional Deficits. 3. Patient will demonstrate an increase in bilateral hip (flex, ABD, and ext) strength to 4+/5 and improved core activation to allow for proper functional mobility as indicated by patients Functional Deficits. 4. Patient will return to ADLs and prolonged walking without increased symptoms or restriction. 5. Patient will report sleeping and putting shoes/socks on pain free. Progression Towards Functional goals:  [] Patient is progressing as expected towards functional goals listed. [] Progression is slowed due to complexities listed. [] Progression has been slowed due to co-morbidities. [x] Plan just implemented, too soon to assess goals progression  [] Other:     ASSESSMENT:      Treatment/Activity Tolerance:  [x] Patient tolerated treatment well [] Patient limited by fatique  [] Patient limited by pain  [] Patient limited by other medical complications  [] Other: Pt genie session well. She demonstrated increased strength, noted by increased resistance. She will see the MD tomorrow and follow-up in 2 weeks if agreeable for MD for re-assessment and possible D/C.    8/15    Patient education: Patient education on PT and plan of care including diagnosis, prognosis, treatment goals and options. Patient agrees with discussed POC and treatment and is aware of rehab process.   7/23    Prognosis: [x] Good [] Fair  [] Poor    Patient Requires Follow-up: [x] Yes  [] No    PLAN:   [] Continue per plan of care [] Alter current plan (see comments)  [x] Plan of care initiated [] Hold pending MD visit [] Discharge    Electronically signed by: Perez Estrada PT, DPT

## 2018-08-16 ENCOUNTER — OFFICE VISIT (OUTPATIENT)
Dept: ORTHOPEDIC SURGERY | Age: 48
End: 2018-08-16

## 2018-08-16 VITALS
BODY MASS INDEX: 39.15 KG/M2 | SYSTOLIC BLOOD PRESSURE: 131 MMHG | DIASTOLIC BLOOD PRESSURE: 78 MMHG | WEIGHT: 235 LBS | HEIGHT: 65 IN | HEART RATE: 83 BPM

## 2018-08-16 DIAGNOSIS — M76.32 ILIOTIBIAL BAND SYNDROME OF LEFT SIDE: ICD-10-CM

## 2018-08-16 DIAGNOSIS — M51.36 DDD (DEGENERATIVE DISC DISEASE), LUMBAR: ICD-10-CM

## 2018-08-16 DIAGNOSIS — M17.12 PRIMARY OSTEOARTHRITIS OF LEFT KNEE: Primary | ICD-10-CM

## 2018-08-16 DIAGNOSIS — M25.562 LEFT KNEE PAIN, UNSPECIFIED CHRONICITY: ICD-10-CM

## 2018-08-16 DIAGNOSIS — M22.42 CHONDROMALACIA PATELLAE OF LEFT KNEE: ICD-10-CM

## 2018-08-16 DIAGNOSIS — M54.42 ACUTE LEFT-SIDED LOW BACK PAIN WITH LEFT-SIDED SCIATICA: ICD-10-CM

## 2018-08-16 PROCEDURE — 99213 OFFICE O/P EST LOW 20 MIN: CPT | Performed by: FAMILY MEDICINE

## 2018-08-16 NOTE — PROGRESS NOTES
Chief Complaint    Knee Pain (follow up on left knee)    Follow-up ongoing lateral left knee pain with underlying chondromalacia patella with mild underlying osteoarthritis and lateral IT band syndrome and mechanical back pain with lumbar degenerative disc disease and facet arthropathy    History of Present Illness:  Zachery Kidd is a 46 y.o. female who is a very pleasant right-hand dominant white female who is a  at [employer] and currently on break for the summer who is a patient of Dr. Wendi Marie who is being seen today for evaluation of the conditions to her back and lateral knee and leg. She states that since mid April 2018, she has noticed episodic pain to her left lower back left gluteal region with occasional pain as well the lateral aspect of her left knee. These started after she was lifting a heavy pot and plan and was actually seen previously for left shoulder pain successfully rehab. She states her shoulder symptoms have improved substantially however she has had continuance of pain to the lateral aspect of her knee performed in the IT band distribution but also into the left gluteal region. She does not exhibit much in the way of left lateral hip pain or groin pain. She did not mention her additional back gluteal and leg symptoms that she was having much more problems with her left shoulder. She is having discomfort into her lower back with prolonged sitting and subsequent positional changes. No numbness or tingling. She is actually more symptomatic with pain to the distal portion of the IT band on the left worsening with sitting crosslegged. Positional changes and rolling over onto her lateral left leg at night. She cannot really say if inflammatories have helped her symptoms. She has had some pseudo-buckling and some popping to the anterior portion of her left knee as well. Denies locking or catching.   She has had no dedicated treatment for her leg symptoms or back symptoms thus far. She does have some occasional burning and throbbing but this does not seem to consistently follow the full left L5 distribution. She denies neurogenic bowel or bladder symptoms. Seen today for orthopedic consultation with imaging. She was seen initially in the office on 7/19/2008 he was started on aggressive conservative treatment for her back and left knee. She presents x-rays taken on her mechanical back pain symptoms have improved dramatically and she is approaching her baseline. She is working on core stabilization has been very compliant with her home-based exercises. She also does have left knee chondromalacia patella with distal IT band and some underlying osteoarthritis and has improved about 75% with regard to her knee. She feels much more comfortable in her brace and her ability to walk distances and going up and downstairs has improved. No further pseudo-buckling. No locking or catching. She does feel stronger and more confidence in her knee. No locking or catching. Swelling has improved. She is continue with her anti-inflammatories is very pleased with her progress. Her shoulder symptoms are also doing very well. Medical History  Patient's medications, allergies, past medical, surgical, social and family histories were reviewed and updated as appropriate. Review of Systems  Relevant review of systems reviewed on 6/5/2018 and available in the patient's chart under the medial tab. Vital Signs  Vitals:    08/16/18 1535   BP: 131/78   Pulse: 83         General Exam:   Constitutional: Patient is adequately groomed with no evidence of malnutrition  DTRs: Deep tendon reflexes are intact  Mental Status: The patient is oriented to time, place and person. The patient's mood and affect are appropriate. Lymphatic: The lymphatic examination bilaterally reveals all areas to be without enlargement or induration.   Vascular: Examination reveals no swelling or calf are preserved. Sensory exam is intact clinically. Left knee exam does reveal some patellofemoral crepitation without high-grade effusion. She does have most for residual clinical tenderness over the distal portion of the IT band. This is much less intense compared to previous exams and tolerated only a 1-2 out of 10. She does exhibit improvements in her hamstring and IT band flexibility to some degree. .  She does not exhibit any evidence of instability at this time, she does have minimal discomfort with patellar grind testing. No instability. Strength testing reasonably well preserved. Negative straight leg raising and logroll testing. Additional Examinations:  Contralateral Exam: Examination of the right hip reveals intact skin. The patient demonstrates full painless range of motion with regards to flexion, abduction, internal and external rotation. There is not tenderness about the greater trochanter. There is a negative straight leg raise against resistance. Strength is 5/5 thorough out all planes. She is not exhibit tenderness over the distal IT band on the right. Right Upper Extremity:  Examination of the right upper extremity does not show any tenderness, deformity or injury. Range of motion is unremarkable. There is no gross instability. There are no rashes, ulcerations or lesions. Strength and tone are normal.  Left Upper Extremity: Examination of the left upper extremity does not show any tenderness, deformity or injury. Range of motion is unremarkable. There is no gross instability. There are no rashes, ulcerations or lesions. Strength and tone are normal.  Neck: Examination of the neck does not show any tenderness, deformity or injury. Range of motion is unremarkable. There is no gross instability. There are no rashes, ulcerations or lesions.   Strength and tone are normal.         Diagnostic Test Findings:            Assessment:  #1.  4 months status post symptomatically back in 4 weeks for reevaluation of her symptoms do persist.  She will contact us and with questions or concerns. Cc: Dr. Pepe Carbajal        This dictation was performed with a verbal recognition program [facility]) and it was checked for errors. It is possible that there are still dictated errors within this office note. If so, please bring any errors to my attention for an addendum. All efforts were made to ensure that this office note is accurate.

## 2018-08-21 ENCOUNTER — HOSPITAL ENCOUNTER (OUTPATIENT)
Dept: PHYSICAL THERAPY | Age: 48
Discharge: HOME | End: 2018-08-22
Admitting: FAMILY MEDICINE

## 2018-08-29 ENCOUNTER — HOSPITAL ENCOUNTER (OUTPATIENT)
Dept: PHYSICAL THERAPY | Age: 48
Discharge: HOME | End: 2018-08-30
Admitting: FAMILY MEDICINE

## 2018-08-29 NOTE — DISCHARGE SUMMARY
SLS/SLS with rotation Normal                   Seated Exam Normal Abnormal N/A Comments   Pelvic Height Normal          Seated Rotation Normal          Seated flexion Normal          B hip IR Normal                      Supine Exam Normal Abnormal N/A Comments   Hip flexion Normal          Abduction Normal          ER Normal          IR Normal          SHANT/Gerardo Normal       Hip scour Normal          SLR Normal          Crossed SLR Normal          SI distraction/compression Normal         Thigh thrust Normal          Hamstring flexibility LLE - lacking 16 deg  RLE - lacking 22 deg      90/90 position               Prone Exam Normal Abnormal N/A Comments   Prone hip IR Normal          PA/Spring Normal       Prone Instability test           Sacral Spring/thrust Normal          Quad flexibility Normal          ROM LEFT RIGHT Comments   Hip Flexion [facility] WFL     Hip Abd [facility] WFL      Hip ER [facility]  WFL      Hip IR 42 48     Hip Extension         Knee Ext [facility] WFL      Knee Flex [facility] WFL                   Strength LEFT RIGHT Comments   Multifidus     Good activation    Transverse Ab    Good activation   Hip Flexors 4+/5 4+/5     Hip Abductors 4/5 4-/5     Hip Extensors 4/5 4/5               Knee flex  4/5 4/5     Knee ext 4/5 4/5                  Joint mobility:               [x]Normal               []Hypo              []Hyper     Palpation: TTP - slightly along ITB on R     Functional Mobility/Transfers: WFL      Posture: rounded shoulders      Gait: (include devices/WB status) WFL    RESTRICTIONS/PRECAUTIONS: none     Exercises/Interventions:   ROM/stretches     SKTC     DKTC     Prayer stretch     Supine HS 30 sec x 3 Added 7/23   Prone quad stretch 30 sec x 3 Added 7/23   Pelvic tilt     Hook lying rotation at 90/90 10 x 3 ^8/6   Cat and camel          Strengthening     TA activation 10 sec x 10  Added 7/23   Ta activation with marches 15 x 1 Added 7/25   Supine SLR 10 x 3 #2 ^8/29   Hip ABD SLR 10 x 3 #2 ^8/29   Hip ext SLR 10 x 3 #2 ^8/29   Bridges 10 sec x 10, red loop TB  ^8/15   clamshells 10 x 3, red loop TB  ^8/6   bicycle 10 x 3 Added 7/30   Quadruped alternate LE reaches 10 x 3 child pose between each set  Added 7/30   Quadruped alternate UE/LE reaches 10 x 3 Added 8/29   Sit to stands with TA activation 10 x 2 Added 8/1   Side steps 35' x  6, red loop TB  ^8/21   SLS 20 sec x 3 each LE Added 8/6   Modified side plank lifts 10 x 2 bilaterally Added 8/8   Tband lat pulls     Tband rows       Manual Intervention             Prone PA      GISTM/STM      Lumbar Manip      SI Manip      Hip belt mobs      Hip LA distraction              Therapeutic Exercise and NMR EXR  [x] (88622) Provided verbal/tactile cueing for activities related to strengthening, flexibility, endurance, ROM  for improvements in proximal hip and core control with self care, mobility, lifting and ambulation.  [] (35404) Provided verbal/tactile cueing for activities related to improving balance, coordination, kinesthetic sense, posture, motor skill, proprioception  to assist with core control in self care, mobility, lifting, and ambulation.      Therapeutic Activities:    [] (60620 or 61357) Provided verbal/tactile cueing for activities related to improving balance, coordination, kinesthetic sense, posture, motor skill, proprioception and motor activation to allow for proper function  with self care and ADLs  [] (93167) Provided training and instruction to the patient for proper core and proximal hip recruitment and positioning with ambulation re-education     Home Exercise Program:    [x] (05093) Reviewed/Progressed HEP activities related to strengthening, flexibility, endurance, ROM of core, proximal hip and LE for functional self-care, mobility, lifting and ambulation   [] (87028) Reviewed/Progressed HEP activities related to improving balance, coordination, kinesthetic sense, posture, motor skill, proprioception of core, proximal hip and LE for self care, mobility, lifting, and ambulation      Manual Treatments:  PROM / STM / Oscillations-Mobs:  G-I, II, III, IV (PA's, Inf., Post.)  [] (07379) Provided manual therapy to mobilize proximal hip and LS spine soft tissue/joints for the purpose of modulating pain, promoting relaxation,  increasing ROM, reducing/eliminating soft tissue swelling/inflammation/restriction, improving soft tissue extensibility and allowing for proper ROM for normal function with self care, mobility, lifting and ambulation. Modalities:   Declined  8/29    Charges:  Timed Code Treatment Minutes: 61'   Total Treatment Minutes: 90'       [] EVAL (LOW) 94366 (typically 20 minutes face-to-face)  [] EVAL (MOD) 93994 (typically 30 minutes face-to-face)  [] EVAL (HIGH) 04744 (typically 45 minutes face-to-face)  [] RE-EVAL     [x] RL(03999) x  2   [] IONTO  [x] NMR (80923) x  1   [] VASO  [] Manual (66761) x       [] Other:  [x] TA x  1    [] Mech Traction (40758)  [] ES(attended) (84998)      [] ES (un) (91447):     Goals: 8/29  Patient stated goal: reduce pain in knee and cross legs to put socks and shoes on - MET    Therapist goals for Patient:   Short Term Goals: To be achieved in: 2 weeks  1. Independent in HEP and progression per patient tolerance, in order to prevent re-injury. Met  2. Patient will have a decrease in pain to facilitate improvement in movement, function, and ADLs as indicated by Functional Deficits. Met    Long Term Goals: To be achieved in: 6  weeks  1. Disability index score of 10% or less for the FABQ/Modified Oswestry to assist with reaching prior level of function. Met  2. Patient will demonstrate increased AROM to WNL, good LS mobility, good hip ROM to allow for proper joint functioning as indicated by patients Functional Deficits. Met  3.  Patient will demonstrate an increase in bilateral hip (flex, ABD, and ext) strength to 4+/5 and improved core activation to allow for proper functional mobility as indicated

## 2018-09-01 ENCOUNTER — HOSPITAL ENCOUNTER (OUTPATIENT)
Dept: OTHER | Age: 48
Discharge: HOME | End: 2018-09-01
Attending: FAMILY MEDICINE | Admitting: FAMILY MEDICINE

## 2018-10-22 RX ORDER — TRIAMCINOLONE ACETONIDE 1 MG/G
CREAM TOPICAL
Qty: 30 G | Refills: 1 | Status: SHIPPED | OUTPATIENT
Start: 2018-10-22 | End: 2019-12-29 | Stop reason: SDUPTHER

## 2018-12-11 ENCOUNTER — OFFICE VISIT (OUTPATIENT)
Dept: ORTHOPEDIC SURGERY | Age: 48
End: 2018-12-11
Payer: COMMERCIAL

## 2018-12-11 VITALS — BODY MASS INDEX: 40.12 KG/M2 | HEIGHT: 64 IN | WEIGHT: 235.01 LBS

## 2018-12-11 DIAGNOSIS — M51.36 DDD (DEGENERATIVE DISC DISEASE), LUMBAR: Primary | ICD-10-CM

## 2018-12-11 DIAGNOSIS — M17.12 PRIMARY OSTEOARTHRITIS OF LEFT KNEE: ICD-10-CM

## 2018-12-11 DIAGNOSIS — M25.562 LEFT KNEE PAIN, UNSPECIFIED CHRONICITY: ICD-10-CM

## 2018-12-11 DIAGNOSIS — M54.50 LUMBAR SPINE PAIN: ICD-10-CM

## 2018-12-11 DIAGNOSIS — M76.32 ILIOTIBIAL BAND SYNDROME OF LEFT SIDE: ICD-10-CM

## 2018-12-11 DIAGNOSIS — M54.42 ACUTE LEFT-SIDED LOW BACK PAIN WITH LEFT-SIDED SCIATICA: ICD-10-CM

## 2018-12-11 DIAGNOSIS — M22.42 CHONDROMALACIA PATELLAE OF LEFT KNEE: ICD-10-CM

## 2018-12-11 PROCEDURE — 99214 OFFICE O/P EST MOD 30 MIN: CPT | Performed by: FAMILY MEDICINE

## 2018-12-11 RX ORDER — METHYLPREDNISOLONE 4 MG/1
TABLET ORAL
Qty: 21 KIT | Refills: 0 | Status: SHIPPED | OUTPATIENT
Start: 2018-12-11 | End: 2018-12-18

## 2018-12-11 RX ORDER — MELOXICAM 15 MG/1
15 TABLET ORAL DAILY
Qty: 30 TABLET | Refills: 3 | Status: SHIPPED | OUTPATIENT
Start: 2018-12-11 | End: 2019-10-14

## 2018-12-11 NOTE — PROGRESS NOTES
without pain. Subscap 5 out of 5. Special Tests:  Negative drop liftoff testing. No further pain with speed's testing and direct palpation of the biceps tendon. There does not appear to be of evidence of instability and she no longer has pain with impingement testing as well. Cross body adduction does produce some anterior shoulder discomfort. Negative screening cervical testing. Skin: There are no rashes, ulcerations or lesions. Distal motor sensory and vascular exam is intact. Gait: Fluid smooth gait. Reflexes:  Symmetrically preserved. Additional Comments:  Evaluation does reveal reasonable lumbar forward flexion. She has recurrent clinical tenderness over left greater than right lower lumbar paraspinals and lower lumbar facets. She does have pain with extension practically to the right which does produce some right gluteal pain. There is no further central left gluteal discomfort. She does not exhibit much pain with palpation of the IT band or trochanteric bursa. She appears to have negative straight leg raising today and screening hip test appears to be fairly benign although she does have tightness of her hamstrings and IT bands. There does not appear to be focal lower extremity motor deficits. DTRs are preserved. Sensory exam is intact clinically. Left knee exam does reveal some patellofemoral crepitation without high-grade effusion. She does have some residual clinical tenderness over the distal portion of the IT band. This is much less intense compared to previous exams and tolerated only a 1-2 out of 10. She does have pain with patellar grind testing. She does exhibit improvements in her hamstring and IT band flexibility to some degree. .  She does not exhibit any evidence of instability at this time, she does have mild to moderate discomfort with patellar grind testing. No instability. Strength testing reasonably well preserved.   Negative straight leg

## 2018-12-12 ENCOUNTER — TELEPHONE (OUTPATIENT)
Dept: ORTHOPEDIC SURGERY | Age: 48
End: 2018-12-12

## 2018-12-18 ENCOUNTER — OFFICE VISIT (OUTPATIENT)
Dept: ORTHOPEDIC SURGERY | Age: 48
End: 2018-12-18
Payer: COMMERCIAL

## 2018-12-18 VITALS — HEIGHT: 64 IN | WEIGHT: 235.01 LBS | BODY MASS INDEX: 40.12 KG/M2

## 2018-12-18 DIAGNOSIS — M51.36 DDD (DEGENERATIVE DISC DISEASE), LUMBAR: ICD-10-CM

## 2018-12-18 DIAGNOSIS — M47.816 LUMBAR FACET JOINT SYNDROME: Primary | ICD-10-CM

## 2018-12-18 DIAGNOSIS — M54.50 ACUTE BILATERAL LOW BACK PAIN WITHOUT SCIATICA: ICD-10-CM

## 2018-12-18 PROCEDURE — L0625 LO FLEX L1-BELOW L5 PRE OTS: HCPCS | Performed by: FAMILY MEDICINE

## 2018-12-18 PROCEDURE — 99213 OFFICE O/P EST LOW 20 MIN: CPT | Performed by: FAMILY MEDICINE

## 2018-12-18 NOTE — PROGRESS NOTES
examination bilaterally reveals all areas to be without enlargement or induration. Vascular: Examination reveals no swelling or calf tenderness. Peripheral pulses are palpable and 2+. Neurological: The patient has good coordination. There is no weakness or sensory deficit. Shoulder Examination    Inspection:  There is no high-grade deformity swelling atrophy or palpable spasm. She does have some mild a.c. crepitation. Palpation:  She No longer has any pain at 0 out of 10 with palpation of the biceps tendon anteriorly involving the left shoulder. She is not exhibit any tenderness over the greater tuberosity and a.c. joint. She has no further posterior cuff discomfort. Rang of Motion:  She has reestablished nearly all of her shoulder motion both actively and passively. Strength:  She has improved strength at 4+ to 5 minus out of 5 with super and infraspinatus testing without pain. Subscap 5 out of 5. Special Tests:  Negative drop liftoff testing. No further pain with speed's testing and direct palpation of the biceps tendon. There does not appear to be of evidence of instability and she no longer has pain with impingement testing as well. Cross body adduction does produce some anterior shoulder discomfort. Negative screening cervical testing. Skin: There are no rashes, ulcerations or lesions. Distal motor sensory and vascular exam is intact. Gait: Fluid smooth gait. Reflexes:  Symmetrically preserved. Additional Comments:  Evaluation does reveal reasonable lumbar forward flexion. She has recurrent clinical tenderness over left greater than right lower lumbar paraspinals and lower lumbar facets. She does have pain with extension practically to the right which does produce some right gluteal pain. There is no further central left gluteal discomfort. She does not exhibit much pain with palpation of the IT band or trochanteric bursa.   She appears to have negative straight leg raising today and screening hip test appears to be fairly benign although she does have tightness of her hamstrings and IT bands. There does not appear to be focal lower extremity motor deficits. DTRs are preserved. Sensory exam is intact clinically. Left knee exam does reveal some patellofemoral crepitation without high-grade effusion. She does have some residual clinical tenderness over the distal portion of the IT band. This is much less intense compared to previous exams and tolerated only a 1-2 out of 10. She does have pain with patellar grind testing. She does exhibit improvements in her hamstring and IT band flexibility to some degree. .  She does not exhibit any evidence of instability at this time, she does have mild to moderate discomfort with patellar grind testing. No instability. Strength testing reasonably well preserved. Negative straight leg raising and logroll testing. Additional Examinations:  Contralateral Exam: Examination of the right hip reveals intact skin. The patient demonstrates full painless range of motion with regards to flexion, abduction, internal and external rotation. There is not tenderness about the greater trochanter. There is a negative straight leg raise against resistance. Strength is 5/5 thorough out all planes. She is not exhibit tenderness over the distal IT band on the right. Right Upper Extremity:  Examination of the right upper extremity does not show any tenderness, deformity or injury. Range of motion is unremarkable. There is no gross instability. There are no rashes, ulcerations or lesions. Strength and tone are normal.  Left Upper Extremity: Examination of the left upper extremity does not show any tenderness, deformity or injury. Range of motion is unremarkable. There is no gross instability. There are no rashes, ulcerations or lesions.   Strength and tone are normal.  Neck: Examination of the neck does not show Preserved disc height and hydration. No compressive discopathy or stenosis. No    foraminal stenosis.        T11-12: Mild endplate spondylosis. Preserved disc height and hydration. No compressive    discopathy or stenosis. No foraminal stenosis.        T12-L1: Mild disc space narrowing, concentric spondylosis. No compressive discopathy or    stenosis. Mild facet arthropathy. Patent foramina.        L1-2: Preserved disc height. No substantive spondylosis. Mild facet arthrosis. Sliver of facet    joint capsular fluid. Patent foramina, central canal and lateral recesses.        L2-3: Preserved disc height. No substantive spondylosis. Mild facet arthrosis. Sliver of facet    joint capsular fluid. Patent foramina, central canal and lateral recesses.        L3-4: Preserved disc height. No substantive spondylosis. Mild facet arthrosis. Sliver of facet    joint capsular fluid. Patent foramina, central canal and lateral recesses.        L4-5: Disc dehydration. Moderate hypertrophic facet arthropathy. Mild ligamentum hypertrophy. No resultant central canal, lateral recess or foraminal stenosis. No neural encroachment.        L5-S1: Left-greater-than-right facet arthropathy. No substantive ligamentum hypertrophy. The    disc height is maintained. Mild concentric spondylosis. No resultant central canal, lateral    recess or foraminal stenosis. Unremarkable visualized SI joints.        S1-2 nerve root sheath cysts incidentally noted as anatomic variation.        Nonobstructed visualized bilateral kidneys.        CONCLUSION:    1. There is no compressive discopathy or stenosis in the lumbosacral spine. 2. No findings that correlate with right or left sided pain. 3. Left-greater-than-right facet arthropathy at L5-S1, moderate hypertrophic facet arthropathy    at L4-5 and mild facet arthrosis in the remainder of the lumbar spine.     4. Diffusely hypointense marrow throughout the lumbosacral region and within the visualized

## 2018-12-19 ENCOUNTER — OFFICE VISIT (OUTPATIENT)
Dept: INTERNAL MEDICINE CLINIC | Age: 48
End: 2018-12-19
Payer: COMMERCIAL

## 2018-12-19 VITALS
WEIGHT: 253 LBS | RESPIRATION RATE: 16 BRPM | HEIGHT: 65 IN | BODY MASS INDEX: 42.15 KG/M2 | SYSTOLIC BLOOD PRESSURE: 122 MMHG | DIASTOLIC BLOOD PRESSURE: 80 MMHG | HEART RATE: 84 BPM

## 2018-12-19 DIAGNOSIS — D64.9 ANEMIA, UNSPECIFIED TYPE: Primary | ICD-10-CM

## 2018-12-19 LAB
BASOPHILS ABSOLUTE: 0.1 K/UL (ref 0–0.2)
BASOPHILS RELATIVE PERCENT: 0.6 %
EOSINOPHILS ABSOLUTE: 0.2 K/UL (ref 0–0.6)
EOSINOPHILS RELATIVE PERCENT: 2.2 %
HCT VFR BLD CALC: 41.3 % (ref 36–48)
HEMOGLOBIN: 14.2 G/DL (ref 12–16)
LYMPHOCYTES ABSOLUTE: 2.1 K/UL (ref 1–5.1)
LYMPHOCYTES RELATIVE PERCENT: 22.5 %
MCH RBC QN AUTO: 31.3 PG (ref 26–34)
MCHC RBC AUTO-ENTMCNC: 34.4 G/DL (ref 31–36)
MCV RBC AUTO: 91.1 FL (ref 80–100)
MONOCYTES ABSOLUTE: 0.5 K/UL (ref 0–1.3)
MONOCYTES RELATIVE PERCENT: 5.5 %
NEUTROPHILS ABSOLUTE: 6.4 K/UL (ref 1.7–7.7)
NEUTROPHILS RELATIVE PERCENT: 69.2 %
PDW BLD-RTO: 13.1 % (ref 12.4–15.4)
PLATELET # BLD: 318 K/UL (ref 135–450)
PMV BLD AUTO: 10.2 FL (ref 5–10.5)
RBC # BLD: 4.53 M/UL (ref 4–5.2)
WBC # BLD: 9.3 K/UL (ref 4–11)

## 2018-12-19 PROCEDURE — 99213 OFFICE O/P EST LOW 20 MIN: CPT | Performed by: INTERNAL MEDICINE

## 2018-12-19 ASSESSMENT — ENCOUNTER SYMPTOMS
COUGH: 0
SHORTNESS OF BREATH: 0

## 2018-12-19 ASSESSMENT — PATIENT HEALTH QUESTIONNAIRE - PHQ9: DEPRESSION UNABLE TO ASSESS: URGENT/EMERGENT SITUATION

## 2018-12-26 ENCOUNTER — HOSPITAL ENCOUNTER (OUTPATIENT)
Dept: PHYSICAL THERAPY | Age: 48
Setting detail: THERAPIES SERIES
Discharge: HOME | End: 2018-12-26
Payer: COMMERCIAL

## 2018-12-26 PROCEDURE — 97140 MANUAL THERAPY 1/> REGIONS: CPT | Performed by: PHYSICAL THERAPIST

## 2018-12-26 PROCEDURE — 97110 THERAPEUTIC EXERCISES: CPT | Performed by: PHYSICAL THERAPIST

## 2018-12-26 PROCEDURE — G8981 BODY POS CURRENT STATUS: HCPCS | Performed by: PHYSICAL THERAPIST

## 2018-12-26 PROCEDURE — 97530 THERAPEUTIC ACTIVITIES: CPT | Performed by: PHYSICAL THERAPIST

## 2018-12-26 PROCEDURE — G8982 BODY POS GOAL STATUS: HCPCS | Performed by: PHYSICAL THERAPIST

## 2018-12-26 PROCEDURE — 97161 PT EVAL LOW COMPLEX 20 MIN: CPT | Performed by: PHYSICAL THERAPIST

## 2018-12-26 NOTE — PLAN OF CARE
consistent with pathology which may benefit from Dry needling     []other:      Prognosis/Rehab Potential:      [x]Excellent   [x]Good    []Fair   []Poor    Tolerance of evaluation/treatment:    [x]Excellent   [x]Good    []Fair   []Poor    Physical Therapy Evaluation Complexity Justification  [x] A history of present problem with:  [] no personal factors and/or comorbidities that impact the plan of care;  [x]1-2 personal factors and/or comorbidities that impact the plan of care  []3 personal factors and/or comorbidities that impact the plan of care  [x] An examination of body systems using standardized tests and measures addressing any of the following: body structures and functions (impairments), activity limitations, and/or participation restrictions;:  [] a total of 1-2 or more elements   [] a total of 3 or more elements   [x] a total of 4 or more elements   [x] A clinical presentation with:  [x] stable and/or uncomplicated characteristics   [] evolving clinical presentation with changing characteristics  [] unstable and unpredictable characteristics;   [x] Clinical decision making of [x] low, [] moderate, [] high complexity using standardized patient assessment instrument and/or measurable assessment of functional outcome. [x] EVAL (LOW) 88866 (typically 20 minutes face-to-face)  [] EVAL (MOD) 29079 (typically 30 minutes face-to-face)  [] EVAL (HIGH) 17332 (typically 45 minutes face-to-face)  [] RE-EVAL         PLAN: Begin PT focusing on: proximal hip mobilizations, LB mobs, LB core activation, proximal hip activation, and HEP    Frequency/Duration:  1-2 days per week for 4 Weeks:  Interventions:  [x]  Therapeutic exercise including: strength training, ROM, for LE, Glutes and core   [x]  NMR activation and proprioception for glutes , LE and Core   [x]  Manual therapy as indicated for Hip complex, LE and spine to include: Dry Needling/IASTM, STM, PROM, Gr I-IV mobilizations, manipulation.    [x]  Modalities as

## 2018-12-26 NOTE — FLOWSHEET NOTE
mobilize proximal hip and LS spine soft tissue/joints for the purpose of modulating pain, promoting relaxation,  increasing ROM, reducing/eliminating soft tissue swelling/inflammation/restriction, improving soft tissue extensibility and allowing for proper ROM for normal function with self care, mobility, lifting and ambulation. Modalities: Declined 12/26      Charges:  Timed Code Treatment Minutes: 45'   Total Treatment Minutes: 79'         [x] EVAL (LOW) 17386 (typically 20 minutes face-to-face)  [] EVAL (MOD) 60950 (typically 30 minutes face-to-face)  [] EVAL (HIGH) 65966 (typically 45 minutes face-to-face)  [] RE-EVAL     [x] JOHN(77996) x  1   [] IONTO  [] NMR (84296) x      [] VASO  [x] Manual (40339) x  1    [] Other:  [x] TA x  1    [] Mech Traction (39699)  [] ES(attended) (28625)      [] ES (un) (22366):     Goals:   Patient stated goal: be looser and no pain     Therapist goals for Patient:   Short Term Goals: To be achieved in: 2 weeks  1. Independent in HEP and progression per patient tolerance, in order to prevent re-injury. 2. Patient will have a decrease in pain to facilitate improvement in movement, function, and ADLs as indicated by Functional Deficits. Long Term Goals: To be achieved in: 4 weeks  1. Disability index score of 8% or less for the Modified oswestry to assist with reaching prior level of function. 2. Patient will demonstrate increased AROM to WNL, good LS mobility, good hip ROM to allow for proper joint functioning as indicated by patients Functional Deficits. 3. Patient will demonstrate an increase in Strength to good proximal hip and core activation to allow for proper functional mobility as indicated by patients Functional Deficits. 4. Patient will return to work without increased symptoms or restriction. 5.  Patient will report sleeping pain free. Progression Towards Functional goals:  [] Patient is progressing as expected towards functional goals listed.     []

## 2018-12-31 ENCOUNTER — HOSPITAL ENCOUNTER (OUTPATIENT)
Dept: PHYSICAL THERAPY | Age: 48
Setting detail: THERAPIES SERIES
Discharge: HOME | End: 2018-12-31
Payer: COMMERCIAL

## 2018-12-31 PROCEDURE — 97110 THERAPEUTIC EXERCISES: CPT | Performed by: PHYSICAL THERAPIST

## 2018-12-31 PROCEDURE — 97530 THERAPEUTIC ACTIVITIES: CPT | Performed by: PHYSICAL THERAPIST

## 2018-12-31 PROCEDURE — 97112 NEUROMUSCULAR REEDUCATION: CPT | Performed by: PHYSICAL THERAPIST

## 2019-01-09 ENCOUNTER — HOSPITAL ENCOUNTER (OUTPATIENT)
Dept: PHYSICAL THERAPY | Age: 49
Setting detail: THERAPIES SERIES
Discharge: HOME | End: 2019-01-09
Payer: COMMERCIAL

## 2019-01-09 PROCEDURE — 97530 THERAPEUTIC ACTIVITIES: CPT | Performed by: PHYSICAL THERAPIST

## 2019-01-09 PROCEDURE — 97110 THERAPEUTIC EXERCISES: CPT | Performed by: PHYSICAL THERAPIST

## 2019-01-09 PROCEDURE — 97112 NEUROMUSCULAR REEDUCATION: CPT | Performed by: PHYSICAL THERAPIST

## 2019-01-17 ENCOUNTER — HOSPITAL ENCOUNTER (OUTPATIENT)
Dept: PHYSICAL THERAPY | Age: 49
Setting detail: THERAPIES SERIES
Discharge: HOME | End: 2019-01-17
Payer: COMMERCIAL

## 2019-01-17 ENCOUNTER — OFFICE VISIT (OUTPATIENT)
Dept: ORTHOPEDIC SURGERY | Age: 49
End: 2019-01-17
Payer: COMMERCIAL

## 2019-01-17 VITALS
SYSTOLIC BLOOD PRESSURE: 129 MMHG | WEIGHT: 235 LBS | HEART RATE: 88 BPM | HEIGHT: 65 IN | BODY MASS INDEX: 39.15 KG/M2 | DIASTOLIC BLOOD PRESSURE: 82 MMHG

## 2019-01-17 DIAGNOSIS — M54.50 ACUTE BILATERAL LOW BACK PAIN WITHOUT SCIATICA: ICD-10-CM

## 2019-01-17 DIAGNOSIS — M51.36 DDD (DEGENERATIVE DISC DISEASE), LUMBAR: ICD-10-CM

## 2019-01-17 DIAGNOSIS — M47.816 LUMBAR FACET JOINT SYNDROME: Primary | ICD-10-CM

## 2019-01-17 PROCEDURE — 97110 THERAPEUTIC EXERCISES: CPT | Performed by: PHYSICAL THERAPIST

## 2019-01-17 PROCEDURE — 97530 THERAPEUTIC ACTIVITIES: CPT | Performed by: PHYSICAL THERAPIST

## 2019-01-17 PROCEDURE — 97112 NEUROMUSCULAR REEDUCATION: CPT | Performed by: PHYSICAL THERAPIST

## 2019-01-17 PROCEDURE — 99213 OFFICE O/P EST LOW 20 MIN: CPT | Performed by: FAMILY MEDICINE

## 2019-01-30 ENCOUNTER — APPOINTMENT (OUTPATIENT)
Dept: PHYSICAL THERAPY | Age: 49
End: 2019-01-30
Payer: COMMERCIAL

## 2019-02-05 ENCOUNTER — TELEPHONE (OUTPATIENT)
Dept: PHYSICAL THERAPY | Age: 49
End: 2019-02-05

## 2019-02-06 PROCEDURE — G8981 BODY POS CURRENT STATUS: HCPCS | Performed by: PHYSICAL THERAPIST

## 2019-02-06 PROCEDURE — G8982 BODY POS GOAL STATUS: HCPCS | Performed by: PHYSICAL THERAPIST

## 2019-02-06 PROCEDURE — G8983 BODY POS D/C STATUS: HCPCS | Performed by: PHYSICAL THERAPIST

## 2019-10-14 ENCOUNTER — OFFICE VISIT (OUTPATIENT)
Dept: INTERNAL MEDICINE CLINIC | Age: 49
End: 2019-10-14
Payer: COMMERCIAL

## 2019-10-14 VITALS
TEMPERATURE: 97.8 F | HEIGHT: 65 IN | SYSTOLIC BLOOD PRESSURE: 118 MMHG | RESPIRATION RATE: 16 BRPM | WEIGHT: 272 LBS | DIASTOLIC BLOOD PRESSURE: 80 MMHG | HEART RATE: 75 BPM | OXYGEN SATURATION: 98 % | BODY MASS INDEX: 45.32 KG/M2

## 2019-10-14 DIAGNOSIS — Z11.4 SCREENING FOR HIV (HUMAN IMMUNODEFICIENCY VIRUS): ICD-10-CM

## 2019-10-14 DIAGNOSIS — Z12.11 COLON CANCER SCREENING: ICD-10-CM

## 2019-10-14 DIAGNOSIS — Z00.00 WELL WOMAN EXAM (NO GYNECOLOGICAL EXAM): Primary | ICD-10-CM

## 2019-10-14 PROCEDURE — 99396 PREV VISIT EST AGE 40-64: CPT | Performed by: INTERNAL MEDICINE

## 2019-10-14 PROCEDURE — 90472 IMMUNIZATION ADMIN EACH ADD: CPT | Performed by: INTERNAL MEDICINE

## 2019-10-14 PROCEDURE — 90688 IIV4 VACCINE SPLT 0.5 ML IM: CPT | Performed by: INTERNAL MEDICINE

## 2019-10-14 PROCEDURE — 90670 PCV13 VACCINE IM: CPT | Performed by: INTERNAL MEDICINE

## 2019-10-14 PROCEDURE — 90471 IMMUNIZATION ADMIN: CPT | Performed by: INTERNAL MEDICINE

## 2019-10-14 ASSESSMENT — ENCOUNTER SYMPTOMS
GASTROINTESTINAL NEGATIVE: 1
RESPIRATORY NEGATIVE: 1
ALLERGIC/IMMUNOLOGIC NEGATIVE: 1
EYES NEGATIVE: 1

## 2019-10-14 ASSESSMENT — PATIENT HEALTH QUESTIONNAIRE - PHQ9
1. LITTLE INTEREST OR PLEASURE IN DOING THINGS: 0
SUM OF ALL RESPONSES TO PHQ QUESTIONS 1-9: 0
2. FEELING DOWN, DEPRESSED OR HOPELESS: 0
SUM OF ALL RESPONSES TO PHQ9 QUESTIONS 1 & 2: 0
SUM OF ALL RESPONSES TO PHQ QUESTIONS 1-9: 0

## 2019-10-16 ENCOUNTER — NURSE ONLY (OUTPATIENT)
Dept: INTERNAL MEDICINE CLINIC | Age: 49
End: 2019-10-16
Payer: COMMERCIAL

## 2019-10-16 DIAGNOSIS — Z12.11 COLON CANCER SCREENING: Primary | ICD-10-CM

## 2019-10-16 DIAGNOSIS — Z12.11 COLON CANCER SCREENING: ICD-10-CM

## 2019-10-16 LAB
CONTROL: NORMAL
HEMOCCULT STL QL: NORMAL

## 2019-10-16 PROCEDURE — 82274 ASSAY TEST FOR BLOOD FECAL: CPT | Performed by: INTERNAL MEDICINE

## 2019-10-23 ENCOUNTER — NURSE ONLY (OUTPATIENT)
Dept: INTERNAL MEDICINE CLINIC | Age: 49
End: 2019-10-23
Payer: COMMERCIAL

## 2019-10-23 DIAGNOSIS — Z00.00 WELL WOMAN EXAM (NO GYNECOLOGICAL EXAM): ICD-10-CM

## 2019-10-23 DIAGNOSIS — Z11.4 SCREENING FOR HIV (HUMAN IMMUNODEFICIENCY VIRUS): ICD-10-CM

## 2019-10-23 LAB
A/G RATIO: 2 (ref 1.1–2.2)
ALBUMIN SERPL-MCNC: 4.2 G/DL (ref 3.4–5)
ALP BLD-CCNC: 70 U/L (ref 40–129)
ALT SERPL-CCNC: 14 U/L (ref 10–40)
ANION GAP SERPL CALCULATED.3IONS-SCNC: 13 MMOL/L (ref 3–16)
AST SERPL-CCNC: 15 U/L (ref 15–37)
BASOPHILS ABSOLUTE: 0 K/UL (ref 0–0.2)
BASOPHILS RELATIVE PERCENT: 0.7 %
BILIRUB SERPL-MCNC: 0.3 MG/DL (ref 0–1)
BUN BLDV-MCNC: 11 MG/DL (ref 7–20)
CALCIUM SERPL-MCNC: 9.1 MG/DL (ref 8.3–10.6)
CHLORIDE BLD-SCNC: 102 MMOL/L (ref 99–110)
CHOLESTEROL, TOTAL: 201 MG/DL (ref 0–199)
CO2: 25 MMOL/L (ref 21–32)
CREAT SERPL-MCNC: 0.5 MG/DL (ref 0.6–1.1)
EOSINOPHILS ABSOLUTE: 0.1 K/UL (ref 0–0.6)
EOSINOPHILS RELATIVE PERCENT: 2.3 %
GFR AFRICAN AMERICAN: >60
GFR NON-AFRICAN AMERICAN: >60
GLOBULIN: 2.1 G/DL
GLUCOSE BLD-MCNC: 101 MG/DL (ref 70–99)
HCT VFR BLD CALC: 36.8 % (ref 36–48)
HDLC SERPL-MCNC: 65 MG/DL (ref 40–60)
HEMOGLOBIN: 12.5 G/DL (ref 12–16)
LDL CHOLESTEROL CALCULATED: 113 MG/DL
LYMPHOCYTES ABSOLUTE: 1.4 K/UL (ref 1–5.1)
LYMPHOCYTES RELATIVE PERCENT: 24.2 %
MCH RBC QN AUTO: 30.4 PG (ref 26–34)
MCHC RBC AUTO-ENTMCNC: 33.9 G/DL (ref 31–36)
MCV RBC AUTO: 89.5 FL (ref 80–100)
MONOCYTES ABSOLUTE: 0.4 K/UL (ref 0–1.3)
MONOCYTES RELATIVE PERCENT: 6.8 %
NEUTROPHILS ABSOLUTE: 3.9 K/UL (ref 1.7–7.7)
NEUTROPHILS RELATIVE PERCENT: 66 %
PDW BLD-RTO: 12.9 % (ref 12.4–15.4)
PLATELET # BLD: 247 K/UL (ref 135–450)
PMV BLD AUTO: 10.4 FL (ref 5–10.5)
POTASSIUM SERPL-SCNC: 4.2 MMOL/L (ref 3.5–5.1)
RBC # BLD: 4.11 M/UL (ref 4–5.2)
SODIUM BLD-SCNC: 140 MMOL/L (ref 136–145)
TOTAL PROTEIN: 6.3 G/DL (ref 6.4–8.2)
TRIGL SERPL-MCNC: 115 MG/DL (ref 0–150)
TSH SERPL DL<=0.05 MIU/L-ACNC: 3.71 UIU/ML (ref 0.27–4.2)
VITAMIN D 25-HYDROXY: 19.2 NG/ML
VLDLC SERPL CALC-MCNC: 23 MG/DL
WBC # BLD: 6 K/UL (ref 4–11)

## 2019-10-23 PROCEDURE — 36415 COLL VENOUS BLD VENIPUNCTURE: CPT | Performed by: INTERNAL MEDICINE

## 2019-10-24 LAB
ESTIMATED AVERAGE GLUCOSE: 108.3 MG/DL
HBA1C MFR BLD: 5.4 %
HIV AG/AB: NORMAL
HIV ANTIGEN: NORMAL
HIV-1 ANTIBODY: NORMAL
HIV-2 AB: NORMAL

## 2019-10-24 RX ORDER — ERGOCALCIFEROL 1.25 MG/1
50000 CAPSULE ORAL WEEKLY
Qty: 4 CAPSULE | Refills: 5 | Status: SHIPPED | OUTPATIENT
Start: 2019-10-24 | End: 2020-03-30

## 2019-10-25 DIAGNOSIS — M54.50 LUMBAR SPINE PAIN: ICD-10-CM

## 2019-10-25 DIAGNOSIS — M54.42 ACUTE LEFT-SIDED LOW BACK PAIN WITH LEFT-SIDED SCIATICA: ICD-10-CM

## 2019-10-25 DIAGNOSIS — M51.36 DDD (DEGENERATIVE DISC DISEASE), LUMBAR: ICD-10-CM

## 2019-10-25 RX ORDER — MELOXICAM 15 MG/1
15 TABLET ORAL DAILY
Qty: 30 TABLET | Refills: 0 | Status: SHIPPED | OUTPATIENT
Start: 2019-10-25 | End: 2020-03-06 | Stop reason: SDUPTHER

## 2019-12-30 RX ORDER — TRIAMCINOLONE ACETONIDE 1 MG/G
CREAM TOPICAL
Qty: 30 G | Refills: 2 | Status: SHIPPED | OUTPATIENT
Start: 2019-12-30 | End: 2021-01-17 | Stop reason: SDUPTHER

## 2020-03-09 RX ORDER — MELOXICAM 15 MG/1
15 TABLET ORAL DAILY
Qty: 30 TABLET | Refills: 0 | Status: SHIPPED | OUTPATIENT
Start: 2020-03-09 | End: 2020-04-03

## 2020-03-30 RX ORDER — ERGOCALCIFEROL 1.25 MG/1
CAPSULE ORAL
Qty: 4 CAPSULE | Refills: 5 | Status: SHIPPED | OUTPATIENT
Start: 2020-03-30 | End: 2021-05-07

## 2020-04-03 RX ORDER — MELOXICAM 15 MG/1
15 TABLET ORAL DAILY
Qty: 30 TABLET | Refills: 0 | Status: SHIPPED | OUTPATIENT
Start: 2020-04-03 | End: 2021-01-22 | Stop reason: ALTCHOICE

## 2021-01-18 RX ORDER — TRIAMCINOLONE ACETONIDE 1 MG/G
CREAM TOPICAL
Qty: 30 G | Refills: 2 | Status: SHIPPED | OUTPATIENT
Start: 2021-01-18

## 2021-01-22 ENCOUNTER — TELEPHONE (OUTPATIENT)
Dept: INTERNAL MEDICINE CLINIC | Age: 51
End: 2021-01-22

## 2021-01-22 ENCOUNTER — OFFICE VISIT (OUTPATIENT)
Dept: INTERNAL MEDICINE CLINIC | Age: 51
End: 2021-01-22
Payer: COMMERCIAL

## 2021-01-22 ENCOUNTER — NURSE TRIAGE (OUTPATIENT)
Dept: OTHER | Facility: CLINIC | Age: 51
End: 2021-01-22

## 2021-01-22 VITALS
HEART RATE: 97 BPM | SYSTOLIC BLOOD PRESSURE: 142 MMHG | RESPIRATION RATE: 18 BRPM | WEIGHT: 269 LBS | BODY MASS INDEX: 45.46 KG/M2 | DIASTOLIC BLOOD PRESSURE: 92 MMHG | OXYGEN SATURATION: 98 % | TEMPERATURE: 98 F

## 2021-01-22 DIAGNOSIS — Z87.442 HISTORY OF KIDNEY STONES: ICD-10-CM

## 2021-01-22 DIAGNOSIS — N23 RENAL COLIC ON LEFT SIDE: Primary | ICD-10-CM

## 2021-01-22 DIAGNOSIS — R39.9 UTI SYMPTOMS: ICD-10-CM

## 2021-01-22 LAB
A/G RATIO: 1.8 (ref 1.1–2.2)
ALBUMIN SERPL-MCNC: 4.2 G/DL (ref 3.4–5)
ALP BLD-CCNC: 75 U/L (ref 40–129)
ALT SERPL-CCNC: 16 U/L (ref 10–40)
ANION GAP SERPL CALCULATED.3IONS-SCNC: 12 MMOL/L (ref 3–16)
AST SERPL-CCNC: 19 U/L (ref 15–37)
BILIRUB SERPL-MCNC: <0.2 MG/DL (ref 0–1)
BILIRUBIN URINE: NEGATIVE
BILIRUBIN, POC: NORMAL
BLOOD URINE, POC: NORMAL
BLOOD, URINE: ABNORMAL
BUN BLDV-MCNC: 20 MG/DL (ref 7–20)
CALCIUM SERPL-MCNC: 9.4 MG/DL (ref 8.3–10.6)
CHLORIDE BLD-SCNC: 101 MMOL/L (ref 99–110)
CLARITY, POC: NORMAL
CLARITY: CLEAR
CO2: 25 MMOL/L (ref 21–32)
COLOR, POC: NORMAL
COLOR: YELLOW
CREAT SERPL-MCNC: 0.7 MG/DL (ref 0.6–1.1)
EPITHELIAL CELLS, UA: 0 /HPF (ref 0–5)
GFR AFRICAN AMERICAN: >60
GFR NON-AFRICAN AMERICAN: >60
GLOBULIN: 2.4 G/DL
GLUCOSE BLD-MCNC: 116 MG/DL (ref 70–99)
GLUCOSE URINE, POC: NORMAL
GLUCOSE URINE: NEGATIVE MG/DL
HYALINE CASTS: 1 /LPF (ref 0–8)
KETONES, POC: NORMAL
KETONES, URINE: NEGATIVE MG/DL
LEUKOCYTE EST, POC: NORMAL
LEUKOCYTE ESTERASE, URINE: NEGATIVE
MICROSCOPIC EXAMINATION: YES
NITRITE, POC: NORMAL
NITRITE, URINE: NEGATIVE
PH UA: 6 (ref 5–8)
PH, POC: 6
POTASSIUM SERPL-SCNC: 4.3 MMOL/L (ref 3.5–5.1)
PROTEIN UA: NEGATIVE MG/DL
PROTEIN, POC: NORMAL
RBC UA: 140 /HPF (ref 0–4)
SODIUM BLD-SCNC: 138 MMOL/L (ref 136–145)
SPECIFIC GRAVITY UA: 1.01 (ref 1–1.03)
SPECIFIC GRAVITY, POC: 1.01
TOTAL PROTEIN: 6.6 G/DL (ref 6.4–8.2)
URINE TYPE: ABNORMAL
UROBILINOGEN, POC: 0.2
UROBILINOGEN, URINE: 0.2 E.U./DL
WBC UA: 2 /HPF (ref 0–5)

## 2021-01-22 PROCEDURE — 81002 URINALYSIS NONAUTO W/O SCOPE: CPT | Performed by: NURSE PRACTITIONER

## 2021-01-22 PROCEDURE — 99213 OFFICE O/P EST LOW 20 MIN: CPT | Performed by: NURSE PRACTITIONER

## 2021-01-22 RX ORDER — NAPROXEN 500 MG/1
500 TABLET ORAL 2 TIMES DAILY PRN
Qty: 60 TABLET | Refills: 0 | Status: SHIPPED | OUTPATIENT
Start: 2021-01-22 | End: 2021-03-31 | Stop reason: ALTCHOICE

## 2021-01-22 SDOH — ECONOMIC STABILITY: TRANSPORTATION INSECURITY
IN THE PAST 12 MONTHS, HAS LACK OF TRANSPORTATION KEPT YOU FROM MEETINGS, WORK, OR FROM GETTING THINGS NEEDED FOR DAILY LIVING?: NO

## 2021-01-22 SDOH — ECONOMIC STABILITY: TRANSPORTATION INSECURITY
IN THE PAST 12 MONTHS, HAS THE LACK OF TRANSPORTATION KEPT YOU FROM MEDICAL APPOINTMENTS OR FROM GETTING MEDICATIONS?: NO

## 2021-01-22 ASSESSMENT — ENCOUNTER SYMPTOMS
ABDOMINAL PAIN: 0
BLOOD IN STOOL: 0
BACK PAIN: 1
NAUSEA: 0
VOMITING: 0
SHORTNESS OF BREATH: 0

## 2021-01-22 ASSESSMENT — PATIENT HEALTH QUESTIONNAIRE - PHQ9
SUM OF ALL RESPONSES TO PHQ QUESTIONS 1-9: 0
1. LITTLE INTEREST OR PLEASURE IN DOING THINGS: 0

## 2021-01-22 NOTE — PROGRESS NOTES
2021     Gilberto Rome (:  1970) is a 44 y.o. female, here for evaluation of the following medical concerns:    Chief Complaint   Patient presents with    Urinary Tract Infection     left side pressure, hematuria, no burning, back pains started this morning       HPI   He is a 66-year-old  female that is presented in the clinic today with left back pain and blood in urine that started 1115 today. She states about 1 to 2 years ago she had a kidney stone that required an ER visit for IV pain medication. she was given a strainer  to look for stone,  no other procedures were needed at that time. She still has the strainer to use. She states that she is tolerating fluids she is not have any nausea or vomiting. She reports the pain is not present at this time,  it comes in waves on and off through her left back. And this morning she said she had a lot more blood at 1115 but now is just light pink in color. She denies any fevers chills or body aches. Denies dysuria frequency or urgency. Review of Systems   Constitutional: Negative for appetite change, chills, fatigue and fever. Respiratory: Negative for shortness of breath. Cardiovascular: Negative for chest pain and palpitations. Gastrointestinal: Negative for abdominal pain, blood in stool, nausea and vomiting. Genitourinary: Positive for flank pain and hematuria. Negative for decreased urine volume, difficulty urinating, dysuria, frequency, pelvic pain, urgency, vaginal bleeding, vaginal discharge and vaginal pain. Musculoskeletal: Positive for back pain. Prior to Visit Medications    Medication Sig Taking? Authorizing Provider   naproxen (NAPROSYN) 500 MG tablet Take 1 tablet by mouth 2 times daily as needed for Pain Yes Sydnee Coughlin APRN - CNP   triamcinolone (KENALOG) 0.1 % cream Apply topically 2 times daily.  Yes Michelle Sharif MD vitamin D (ERGOCALCIFEROL) 1.25 MG (65520 UT) CAPS capsule TAKE 1 CAPSULE BY MOUTH 1 TIME A WEEK  Patient taking differently: Take 50,000 Units by mouth Twice a Week  Yes Aislinn Solomon MD        Social History     Tobacco Use    Smoking status: Never Smoker    Smokeless tobacco: Never Used   Substance Use Topics    Alcohol use: No        Vitals:    01/22/21 1345 01/22/21 1347   BP: (!) 144/92 (!) 142/92   Site: Left Upper Arm Left Upper Arm   Position: Sitting Sitting   Cuff Size: Large Adult Large Adult   Pulse: 97    Resp: 18    Temp: 98 °F (36.7 °C)    SpO2: 98%    Weight: 269 lb (122 kg)      Estimated body mass index is 45.46 kg/m² as calculated from the following:    Height as of 10/14/19: 5' 4.5\" (1.638 m). Weight as of this encounter: 269 lb (122 kg). Physical Exam  Vitals signs reviewed. Constitutional:       General: She is not in acute distress. Appearance: Normal appearance. She is not ill-appearing, toxic-appearing or diaphoretic. HENT:      Head: Normocephalic and atraumatic. Neck:      Musculoskeletal: Normal range of motion and neck supple. Cardiovascular:      Rate and Rhythm: Normal rate and regular rhythm. Pulses: Normal pulses. Heart sounds: Normal heart sounds. Pulmonary:      Effort: Pulmonary effort is normal.      Breath sounds: Normal breath sounds. Abdominal:      General: Bowel sounds are normal. There is no distension. Palpations: Abdomen is soft. There is no mass. Tenderness: There is no abdominal tenderness. There is left CVA tenderness. There is no right CVA tenderness. Hernia: No hernia is present. Comments: No suprapubic pain . Musculoskeletal: Normal range of motion. Skin:     General: Skin is warm and dry. Neurological:      General: No focal deficit present. Mental Status: She is alert and oriented to person, place, and time.    Psychiatric:         Mood and Affect: Mood normal. Behavior: Behavior normal.         ASSESSMENT/PLAN:  1. Renal colic on left side, acute   Warm heat to back   Take naproxen as needed. Please increase fluid intake to 2-3 L a day   If pain worsens, increase blood in urine, not having any urine or decrease amount or , nausea/ vomiting - you will need to be seen in the ER ASAP  patient agrees with plan of care. If not improving by Monday , call the office for further instructions by DR Monterroso.  - naproxen (NAPROSYN) 500 MG tablet; Take 1 tablet by mouth 2 times daily as needed for Pain  Dispense: 60 tablet; Refill: 0  - Comprehensive Metabolic Panel; Future  - POCT Urinalysis no Micro  - Culture, Urine  - URINALYSIS WITH MICROSCOPIC    2. History of kidney stones- possible recurrent episode. Instructed strain urine with strainer. Monitor for worsening s/sx. Aware that a CT maybe needed if not improving by Monday or if she worsens.   - POCT Urinalysis no Micro  - Culture, Urine  - URINALYSIS WITH MICROSCOPIC  Results for POC orders placed in visit on 01/22/21   POCT Urinalysis no Micro   Result Value Ref Range    Color, UA pink     Clarity, UA cloudy     Glucose, UA POC N     Bilirubin, UA N     Ketones, UA N     Spec Grav, UA 1.010     Blood, UA POC Lg     pH, UA 6.0     Protein, UA POC Tr     Urobilinogen, UA 0.2     Leukocytes, UA N     Nitrite, UA N        3. UTI symptoms    - Culture, Urine      Return if symptoms worsen or fail to improve. All questions addresses and answered . Patient agrees with plan of care. An electronic signature was used to authenticate this note.     --MARKO Box - CNP on 1/22/2021 at 2:22 PM

## 2021-01-22 NOTE — TELEPHONE ENCOUNTER
Reason for Disposition   Side (flank) or back pain present    Answer Assessment - Initial Assessment Questions  1. COLOR of URINE: \"Describe the color of the urine. \"  (e.g., tea-colored, pink, red, blood clots, bloody)      Red    2. ONSET: \"When did the bleeding start? \"       Today    3. EPISODES: \"How many times has there been blood in the urine? \" or \"How many times today?\"      1  4. PAIN with URINATION: \"Is there any pain with passing your urine? \" If so, ask: \"How bad is the pain? \"  (Scale 1-10; or mild, moderate, severe)     - MILD - complains slightly about urination hurting     - MODERATE - interferes with normal activities       - SEVERE - excruciating, unwilling or unable to urinate because of the pain       4/10    5. FEVER: \"Do you have a fever? \" If so, ask: \"What is your temperature, how was it measured, and when did it start? \"      No    6. ASSOCIATED SYMPTOMS: Polina Aguilars you passing urine more frequently than usual?\"      No    7. OTHER SYMPTOMS: \"Do you have any other symptoms? \" (e.g., back/flank pain, abdominal pain, vomiting)      Left flank radiating to left groin    8. PREGNANCY: \"Is there any chance you are pregnant? \" \"When was your last menstrual period? \"      no    Protocols used: URINE - BLOOD IN-ADULT-[place]  Patient called pre-service center [facility] ([address] [place] [address]) with red flag complaint. Brief description of triage: blood in urine, left flank pain radiating to left groin, left flank pain 4/10 since this am. Hx kidney stones. Triage indicates for patient to go to office today. Care advice provided, patient verbalizes understanding; denies any other questions or concerns; instructed to call back for any new or worsening symptoms. Writer provided warm transfer to [facility] for appointment scheduling. Attention Provider: Thank you for allowing me to participate in the care of your patient.   The patient was connected to triage in response to information provided to the E[facility].  Please do not respond through this encounter as the response is not directed to a shared pool.

## 2021-01-22 NOTE — TELEPHONE ENCOUNTER
A call was transfer in from the call center Nurse Triage. Patient has blood in Urine and back pains. I schedule patient to see Florencio Cat today at 1:30.

## 2021-01-22 NOTE — PATIENT INSTRUCTIONS
Please increase fluid intake to 2-3 L a day   Take naproysyn  For pain   If pain worsens, increase blood in urine, not having any urine or decrease amount or , nausea/ vomiting - you will need to be seen in the ER ASAP    If not improving by Monday , call the office for further instructions by DR Monterroso. Patient Education        Kidney Stone: Care Instructions  Your Care Instructions     Kidney stones are formed when salts, minerals, and other substances normally found in the urine clump together. They can be as small as grains of sand or, rarely, as large as golf balls. While the stone is traveling through the ureter, which is the tube that carries urine from the kidney to the bladder, you will probably feel pain. The pain may be mild or very severe. You may also have some blood in your urine. As soon as the stone reaches the bladder, any intense pain should go away. If a stone is too large to pass on its own, you may need a medical procedure to help you pass the stone. The doctor has checked you carefully, but problems can develop later. If you notice any problems or new symptoms, get medical treatment right away. Follow-up care is a key part of your treatment and safety. Be sure to make and go to all appointments, and call your doctor if you are having problems. It's also a good idea to know your test results and keep a list of the medicines you take. How can you care for yourself at home? · Drink plenty of fluids, enough so that your urine is light yellow or clear like water. If you have kidney, heart, or liver disease and have to limit fluids, talk with your doctor before you increase the amount of fluids you drink. · Take pain medicines exactly as directed. Call your doctor if you think you are having a problem with your medicine. ? If the doctor gave you a prescription medicine for pain, take it as prescribed. ? If you are not taking a prescription pain medicine, ask your doctor if you can take an over-the-counter medicine. Read and follow all instructions on the label. · Your doctor may ask you to strain your urine so that you can collect your kidney stone when it passes. You can use a kitchen strainer or a tea strainer to catch the stone. Store it in a plastic bag until you see your doctor again. Preventing future kidney stones  Some changes in your diet may help prevent kidney stones. Depending on the cause of your stones, your doctor may recommend that you:  · Drink plenty of fluids, enough so that your urine is light yellow or clear like water. If you have kidney, heart, or liver disease and have to limit fluids, talk with your doctor before you increase the amount of fluids you drink. · Limit coffee, tea, and alcohol. Also avoid grapefruit juice. · Do not take more than the recommended daily dose of vitamins C and D.  · Avoid antacids such as Gaviscon, Maalox, Mylanta, or Tums. · Limit the amount of salt (sodium) in your diet. · Eat a balanced diet that is not too high in protein. · Limit foods that are high in a substance called oxalate, which can cause kidney stones. These foods include dark green vegetables, rhubarb, chocolate, wheat bran, nuts, cranberries, and beans. When should you call for help? Call your doctor now or seek immediate medical care if:    · You cannot keep down fluids.     · Your pain gets worse.     · You have a fever or chills.     · You have new or worse pain in your back just below your rib cage (the flank area).     · You have new or more blood in your urine. Watch closely for changes in your health, and be sure to contact your doctor if:    · You do not get better as expected. Where can you learn more? Go to [email or web address] and sign in to your [employer] account. Enter A743 in the [employer] box to learn more about \"Kidney Stone: Care Instructions. \"     If you do not have an account, please click on the \"Sign Up Now\" link. Current as of: April 15, 2020               Content Version: 12.6  © 3120-8406 [employer]. Care instructions adapted under license by [facility] ([facility]). If you have questions about a medical condition or this instruction, always ask your healthcare professional. [place] [address] any warranty or liability for your use of this information.

## 2021-01-23 LAB — URINE CULTURE, ROUTINE: NORMAL

## 2021-02-24 ENCOUNTER — OFFICE VISIT (OUTPATIENT)
Dept: INTERNAL MEDICINE CLINIC | Age: 51
End: 2021-02-24
Payer: COMMERCIAL

## 2021-02-24 VITALS
RESPIRATION RATE: 18 BRPM | DIASTOLIC BLOOD PRESSURE: 92 MMHG | HEART RATE: 85 BPM | SYSTOLIC BLOOD PRESSURE: 144 MMHG | WEIGHT: 263 LBS | TEMPERATURE: 97.9 F | BODY MASS INDEX: 44.45 KG/M2 | OXYGEN SATURATION: 99 %

## 2021-02-24 DIAGNOSIS — Z12.11 COLON CANCER SCREENING: ICD-10-CM

## 2021-02-24 DIAGNOSIS — R03.0 ELEVATED BP WITHOUT DIAGNOSIS OF HYPERTENSION: ICD-10-CM

## 2021-02-24 DIAGNOSIS — Z00.00 WELL FEMALE EXAM WITHOUT GYNECOLOGICAL EXAM: Primary | ICD-10-CM

## 2021-02-24 LAB
A/G RATIO: 1.7 (ref 1.1–2.2)
ALBUMIN SERPL-MCNC: 4.2 G/DL (ref 3.4–5)
ALP BLD-CCNC: 75 U/L (ref 40–129)
ALT SERPL-CCNC: 13 U/L (ref 10–40)
ANION GAP SERPL CALCULATED.3IONS-SCNC: 8 MMOL/L (ref 3–16)
AST SERPL-CCNC: 15 U/L (ref 15–37)
BASOPHILS ABSOLUTE: 0.1 K/UL (ref 0–0.2)
BASOPHILS RELATIVE PERCENT: 1 %
BILIRUB SERPL-MCNC: 0.4 MG/DL (ref 0–1)
BUN BLDV-MCNC: 14 MG/DL (ref 7–20)
CALCIUM SERPL-MCNC: 9.6 MG/DL (ref 8.3–10.6)
CHLORIDE BLD-SCNC: 102 MMOL/L (ref 99–110)
CHOLESTEROL, TOTAL: 209 MG/DL (ref 0–199)
CO2: 28 MMOL/L (ref 21–32)
CREAT SERPL-MCNC: 0.6 MG/DL (ref 0.6–1.1)
EOSINOPHILS ABSOLUTE: 0.2 K/UL (ref 0–0.6)
EOSINOPHILS RELATIVE PERCENT: 2.9 %
GFR AFRICAN AMERICAN: >60
GFR NON-AFRICAN AMERICAN: >60
GLOBULIN: 2.5 G/DL
GLUCOSE BLD-MCNC: 107 MG/DL (ref 70–99)
HCT VFR BLD CALC: 36.6 % (ref 36–48)
HDLC SERPL-MCNC: 58 MG/DL (ref 40–60)
HEMOGLOBIN: 12.4 G/DL (ref 12–16)
LDL CHOLESTEROL CALCULATED: 137 MG/DL
LYMPHOCYTES ABSOLUTE: 1.5 K/UL (ref 1–5.1)
LYMPHOCYTES RELATIVE PERCENT: 25.6 %
MCH RBC QN AUTO: 30.1 PG (ref 26–34)
MCHC RBC AUTO-ENTMCNC: 34 G/DL (ref 31–36)
MCV RBC AUTO: 88.7 FL (ref 80–100)
MONOCYTES ABSOLUTE: 0.3 K/UL (ref 0–1.3)
MONOCYTES RELATIVE PERCENT: 5.9 %
NEUTROPHILS ABSOLUTE: 3.8 K/UL (ref 1.7–7.7)
NEUTROPHILS RELATIVE PERCENT: 64.6 %
PDW BLD-RTO: 13.5 % (ref 12.4–15.4)
PLATELET # BLD: 291 K/UL (ref 135–450)
PMV BLD AUTO: 9.8 FL (ref 5–10.5)
POTASSIUM SERPL-SCNC: 4.5 MMOL/L (ref 3.5–5.1)
RBC # BLD: 4.13 M/UL (ref 4–5.2)
SODIUM BLD-SCNC: 138 MMOL/L (ref 136–145)
TOTAL PROTEIN: 6.7 G/DL (ref 6.4–8.2)
TRIGL SERPL-MCNC: 69 MG/DL (ref 0–150)
TSH REFLEX: 2.55 UIU/ML (ref 0.27–4.2)
VITAMIN D 25-HYDROXY: 48.7 NG/ML
VLDLC SERPL CALC-MCNC: 14 MG/DL
WBC # BLD: 5.9 K/UL (ref 4–11)

## 2021-02-24 PROCEDURE — 99396 PREV VISIT EST AGE 40-64: CPT | Performed by: INTERNAL MEDICINE

## 2021-02-24 PROCEDURE — 36415 COLL VENOUS BLD VENIPUNCTURE: CPT | Performed by: INTERNAL MEDICINE

## 2021-02-24 RX ORDER — MELOXICAM 15 MG/1
TABLET ORAL
COMMUNITY
Start: 2021-02-12 | End: 2021-05-12 | Stop reason: SDUPTHER

## 2021-02-24 ASSESSMENT — PATIENT HEALTH QUESTIONNAIRE - PHQ9
SUM OF ALL RESPONSES TO PHQ QUESTIONS 1-9: 0
SUM OF ALL RESPONSES TO PHQ9 QUESTIONS 1 & 2: 0
SUM OF ALL RESPONSES TO PHQ QUESTIONS 1-9: 0
SUM OF ALL RESPONSES TO PHQ QUESTIONS 1-9: 0

## 2021-02-24 ASSESSMENT — ENCOUNTER SYMPTOMS
RESPIRATORY NEGATIVE: 1
ALLERGIC/IMMUNOLOGIC NEGATIVE: 1
EYES NEGATIVE: 1
GASTROINTESTINAL NEGATIVE: 1

## 2021-02-24 NOTE — PROGRESS NOTES
Subjective:    cc:  Physical exam  Patient ID: Moiz Alonzo is a 44 y.o. female. HPI    Here for physical exam.  Seeing gyn yrly. Getting mammograms yrly. No c/o. Minimal exercise. Wt down 6 lbs. Will need foot surgery in May. Review of Systems   Constitutional: Negative. HENT: Negative. Eyes: Negative. Respiratory: Negative. Cardiovascular: Negative. Gastrointestinal: Negative. Endocrine: Negative. Genitourinary: Negative. Musculoskeletal: Negative. Skin: Negative. Allergic/Immunologic: Negative. Neurological: Negative. Hematological: Negative. Psychiatric/Behavioral: Negative. Allergies   Allergen Reactions    Tetanus Toxoids Other (See Comments)     Hot, swollen, tender       Current Outpatient Medications:     naproxen (NAPROSYN) 500 MG tablet, Take 1 tablet by mouth 2 times daily as needed for Pain, Disp: 60 tablet, Rfl: 0    triamcinolone (KENALOG) 0.1 % cream, Apply topically 2 times daily. , Disp: 30 g, Rfl: 2    vitamin D (ERGOCALCIFEROL) 1.25 MG (40627 UT) CAPS capsule, TAKE 1 CAPSULE BY MOUTH 1 TIME A WEEK (Patient taking differently: Take 50,000 Units by mouth Twice a Week ), Disp: 4 capsule, Rfl: 5  Past Medical History:   Diagnosis Date    HIGH CHOLESTEROL     Hyperlipidemia      Past Surgical History:   Procedure Laterality Date    CHOLECYSTECTOMY  1998    ENDOMETRIAL ABLATION      VENTRAL HERNIA REPAIR       Social History     Socioeconomic History    Marital status:      Spouse name: Not on file    Number of children: Not on file    Years of education: Not on file    Highest education level: Not on file   Occupational History    Not on file   Social Needs    Financial resource strain: Not hard at all   Gary-Pema insecurity     Worry: Never true     Inability: Never true   [employer] needs     Medical: No     Non-medical: No   Tobacco Use    Smoking status: Never Smoker    Smokeless tobacco: Never Used Substance and Sexual Activity    Alcohol use: No    Drug use: No    Sexual activity: Yes     Partners: Male   Lifestyle    Physical activity     Days per week: Not on file     Minutes per session: Not on file    Stress: Not on file   Relationships    Social connections     Talks on phone: Not on file     Gets together: Not on file     Attends Islam service: Not on file     Active member of club or organization: Not on file     Attends meetings of clubs or organizations: Not on file     Relationship status: Not on file    Intimate partner violence     Fear of current or ex partner: Not on file     Emotionally abused: Not on file     Physically abused: Not on file     Forced sexual activity: Not on file   Other Topics Concern    Not on file   Social History Narrative    Not on file     family history includes COPD in her mother; Cancer in her father, maternal grandfather, mother, and paternal grandmother; High Blood Pressure in her father and mother; High Cholesterol in her father and mother; Other in her maternal grandmother; Stroke in her paternal grandfather. BP (!) 144/92 (Site: Left Upper Arm, Position: Sitting, Cuff Size: Large Adult)   Pulse 85   Temp 97.9 °F (36.6 °C)   Resp 18   Wt 263 lb (119.3 kg)   SpO2 99%   BMI 44.45 kg/m²     Objective:   Physical Exam  Vitals signs reviewed. Constitutional:       General: She is not in acute distress. Appearance: Normal appearance. She is well-developed. She is not diaphoretic. HENT:      Head: Normocephalic and atraumatic. Right Ear: Tympanic membrane, ear canal and external ear normal. There is no impacted cerumen. Left Ear: Tympanic membrane, ear canal and external ear normal. There is no impacted cerumen. Nose: Nose normal.      Mouth/Throat:      Mouth: Mucous membranes are moist.      Pharynx: Oropharynx is clear. No oropharyngeal exudate or posterior oropharyngeal erythema. Eyes:      General: No scleral icterus. diagnosis of hypertension    3. Colon cancer screening            Plan:      Cathreine Clup was seen today for annual exam.    Diagnoses and all orders for this visit:    Well female exam without gynecological exam  -     Comprehensive Metabolic Panel; Future  -     CBC Auto Differential; Future  -     Lipid Panel; Future  -     Vitamin D 25 Hydroxy; Future  -     Hemoglobin A1C; Future  -     TSH with Reflex; Future    Elevated BP without diagnosis of hypertension:  Cont wt loss. Recheck with Sydnee in 1 month. Exercising daily.       Colon ca screen:  Fit test

## 2021-02-25 LAB
ESTIMATED AVERAGE GLUCOSE: 111.2 MG/DL
HBA1C MFR BLD: 5.5 %

## 2021-03-31 ENCOUNTER — OFFICE VISIT (OUTPATIENT)
Dept: INTERNAL MEDICINE CLINIC | Age: 51
End: 2021-03-31
Payer: COMMERCIAL

## 2021-03-31 VITALS
WEIGHT: 261 LBS | HEART RATE: 88 BPM | SYSTOLIC BLOOD PRESSURE: 154 MMHG | OXYGEN SATURATION: 98 % | TEMPERATURE: 97.7 F | BODY MASS INDEX: 44.56 KG/M2 | HEIGHT: 64 IN | DIASTOLIC BLOOD PRESSURE: 92 MMHG

## 2021-03-31 DIAGNOSIS — Z12.11 COLON CANCER SCREENING: ICD-10-CM

## 2021-03-31 DIAGNOSIS — R03.0 ELEVATED BP WITHOUT DIAGNOSIS OF HYPERTENSION: Primary | ICD-10-CM

## 2021-03-31 LAB
CONTROL: NORMAL
HEMOCCULT STL QL: NORMAL

## 2021-03-31 PROCEDURE — 82274 ASSAY TEST FOR BLOOD FECAL: CPT | Performed by: INTERNAL MEDICINE

## 2021-03-31 PROCEDURE — 99213 OFFICE O/P EST LOW 20 MIN: CPT | Performed by: NURSE PRACTITIONER

## 2021-03-31 RX ORDER — LISINOPRIL 10 MG/1
10 TABLET ORAL DAILY
Qty: 90 TABLET | Refills: 1 | Status: SHIPPED | OUTPATIENT
Start: 2021-03-31 | End: 2021-09-21

## 2021-03-31 ASSESSMENT — ENCOUNTER SYMPTOMS
SHORTNESS OF BREATH: 0
TROUBLE SWALLOWING: 0

## 2021-03-31 NOTE — PATIENT INSTRUCTIONS
minutes of exercise on most days of the week. Walking is a good choice. You also may want to do other activities, such as running, swimming, cycling, or playing tennis or team sports. · Avoid or limit alcohol. Talk to your doctor about whether you can drink any alcohol. · Eat plenty of fruits, vegetables, and low-fat dairy products. Eat less saturated and total fats. · Learn how to check your blood pressure at home. When should you call for help? Call your doctor now or seek immediate medical care if:    · Your blood pressure is much higher than normal (such as 180/110 or higher).     · You think high blood pressure is causing symptoms such as:  ¨ Severe headache. ¨ Blurry vision.    Watch closely for changes in your health, and be sure to contact your doctor if:    · You do not get better as expected. Where can you learn more? Go to https://AdociapeSLIC gameseb.[employer]. org and sign in to your PhotoSynesi account. Enter A897 in the [employer] box to learn more about \"Elevated Blood Pressure: Care Instructions. \"     If you do not have an account, please click on the \"Sign Up Now\" link. Current as of: May 10, 2017  Content Version: 11.6  © 5495-6933 MomentCam, [email or web address]. Care instructions adapted under license by [facility] ([facility]). If you have questions about a medical condition or this instruction, always ask your healthcare professional. [place] [address] any warranty or liability for your use of this information. Patient Education        How to Read a Food Label to Limit Sodium: Care Instructions  Overview  Limiting sodium can be an important part of managing some health problems. Processed foods, fast food, and restaurant foods are the major sources of dietary sodium. The most common name for sodium is salt. Most packaged foods have a Nutrition Facts label. This will tell you how much sodium is in one serving of food.   Follow-up care is a key part of your treatment and safety. Be sure to make and go to all appointments, and call your doctor if you are having problems. It's also a good idea to know your test results and keep a list of the medicines you take. How can you care for yourself at home? Read ingredient lists on food labels  · Read the list of ingredients on food labels to help you find how much sodium is in a food. The label lists the ingredients in a food in descending order (from the most to the least). If salt or sodium is high on the list, there may be a lot of sodium in the food. · Know that sodium has different names. Sodium is also called monosodium glutamate (MSG, common in [facility] food), sodium citrate, sodium alginate, and sodium phosphate. Read Nutrition Facts labels  · On most foods, there is a Nutrition Facts label. This will tell you how much sodium is in one serving of food. Look at both the serving size and the sodium amount. The serving size is located at the top of the label, usually right under the \"Nutrition Facts\" title. The amount of sodium is given in the list under the title. It is given in milligrams (mg). ? Check the serving size carefully. A single serving is often very small, and you may eat more than one serving. If this is the case, you will eat more sodium than listed on the label. For example, if the serving size for a canned soup is 1 cup and the sodium amount is 470 mg, if you have 2 cups you will eat 940 mg of sodium. · The nutrition facts for fresh fruits and vegetables are not listed on the food. They may be listed somewhere in the store. These foods usually have no sodium or low sodium. · The Nutrition Facts label also gives you the Percent Daily Value for sodium. This is how much of the recommended amount of sodium a serving contains. The daily value for sodium is less than 2,300 mg. So if the Percent Daily Value says 50%, this means one serving is giving you half of this, or 1,150 mg.   Buy low-sodium foods  · Look for foods that are made with less sodium. Watch for the following words on the label. ? \"Unsalted\" means there is no sodium added to the food. But there may be sodium already in the food naturally. ? \"Sodium-free\" means a serving has less than 5 mg of sodium. ? \"Very low sodium\" means a serving has 35 mg or less of sodium. ? \"Low-sodium\" means a serving has 140 mg or less of sodium. · \"Reduced-sodium\" means that there is 25% less sodium than what the food normally has. This is still usually too much sodium. Try not to buy foods with this on the label. · Buy fresh vegetables, or frozen vegetables without added sauces. Buy low-sodium versions of canned vegetables, soups, and other canned goods. Where can you learn more? Go to https://JoystickerspepicTSAT Group.N[employer]. org and sign in to your Beanstalk Tax account. Enter 26 [id number] in the [facility] box to learn more about \"How to Read a Food Label to Limit Sodium: Care Instructions. \"     If you do not have an account, please click on the \"Sign Up Now\" link. Current as of: December 17, 2020               Content Version: 12.8  © 2006-2021 HSystem. Care instructions adapted under license by [facility] ([facility]). If you have questions about a medical condition or this instruction, always ask your healthcare professional. [place] [address] any warranty or liability for your use of this information. Patient Education        Learning About Low-Sodium Foods  What foods are low in sodium? The foods you eat contain nutrients, such as vitamins and minerals. Sodium is a nutrient. Your body needs the right amount to stay healthy and work as it should. You can use the list below to help you make choices about which foods to eat.   Fruits  · Fresh, frozen, canned, or dried fruit  Vegetables  · Fresh or frozen vegetables, with no added salt  · Canned vegetables, low-sodium or with no added salt  Grains  · Bagels without salted tops  · Cereal with no added salt  · Corn tortillas  · Crackers with no added salt  · Oatmeal, cooked without salt  · Popcorn with no salt  · Pasta and noodles, cooked without salt  · Rice, cooked without salt  · Unsalted pretzels  Dairy and dairy alternatives  · Butter, unsalted  · Cream cheese  · Ice cream  · Milk  · Soy milk  Meats and other protein foods  · Beans and peas, canned with no salt  · Eggs  · Fresh fish (not smoked)  · Fresh meats (not smoked or cured)  · Nuts and nut butter, prepared without salt  · Poultry, not packaged with sodium solution  · Tofu, unseasoned  · Tuna, canned without salt  Seasonings  · Garlic  · Herbs and spices  · Lemon juice  · Mustard  · Olive oil  · Salt-free seasoning mixes  · Vinegar  Work with your doctor to find out how much of this nutrient you need. Depending on your health, you may need more or less of it in your diet. Where can you learn more? Go to https://fitogram.[facility]. org and sign in to your Pfenex account. Enter T145 in the [employer] box to learn more about \"Learning About Low-Sodium Foods. \"     If you do not have an account, please click on the \"Sign Up Now\" link. Current as of: December 17, 2020               Content Version: 12.8  © 2057-4831 Healthwise, Incorporated. Care instructions adapted under license by [facility] ([facility]). If you have questions about a medical condition or this instruction, always ask your healthcare professional. [place] [address] any warranty or liability for your use of this information. Patient Education        Low Sodium Diet (2,000 Milligram): Care Instructions  Overview     Limiting sodium can be an important part of managing some health problems. The most common source of sodium is salt. People get most of the salt in their diet from canned, prepared, and packaged foods. Fast food and restaurant meals also are very high in sodium.  Your doctor will probably limit your sodium to less than 2,000 milligrams (mg) a day. This limit counts all the sodium in prepared and packaged foods and any salt you add to your food. Follow-up care is a key part of your treatment and safety. Be sure to make and go to all appointments, and call your doctor if you are having problems. It's also a good idea to know your test results and keep a list of the medicines you take. How can you care for yourself at home? Read food labels  · Read labels on cans and food packages. The labels tell you how much sodium is in each serving. Make sure that you look at the serving size. If you eat more than the serving size, you have eaten more sodium. · Food labels also tell you the Percent Daily Value for sodium. Choose products with low Percent Daily Values for sodium. · Be aware that sodium can come in forms other than salt, including monosodium glutamate (MSG), sodium citrate, and sodium bicarbonate (baking soda). MSG is often added to Asian food. When you eat out, you can sometimes ask for food without MSG or added salt. Buy low-sodium foods  · Buy foods that are labeled \"unsalted\" (no salt added), \"sodium-free\" (less than 5 mg of sodium per serving), or \"low-sodium\" (140 mg or less of sodium per serving). Foods labeled \"reduced-sodium\" and \"light sodium\" may still have too much sodium. Be sure to read the label to see how much sodium you are getting. · Buy fresh vegetables, or frozen vegetables without added sauces. Buy low-sodium versions of canned vegetables, soups, and other canned goods. Prepare low-sodium meals  · Cut back on the amount of salt you use in cooking. This will help you adjust to the taste. Do not add salt after cooking. One teaspoon of salt has about 2,300 mg of sodium. · Take the salt shaker off the table. · Flavor your food with garlic, lemon juice, onion, vinegar, herbs, and spices. Do not use soy sauce, lite soy sauce, steak sauce, onion salt, garlic salt, celery salt, or ketchup on your food.   · Use low-sodium salad dressings, sauces, and ketchup. Or make your own salad dressings and sauces without adding salt. · Use less salt (or none) when recipes call for it. You can often use half the salt a recipe calls for without losing flavor. Other foods such as rice, pasta, and grains do not need added salt. · Rinse canned vegetables, and cook them in fresh water. This removes somebut not allof the salt. · Avoid water that is naturally high in sodium or that has been treated with water softeners, which add sodium. If you buy bottled water, read the label and choose a sodium-free brand. Avoid high-sodium foods  · Avoid eating:  ? Smoked, cured, salted, and canned meat, fish, and poultry. ? Ham, fox, hot dogs, and luncheon meats. ? Regular, hard, and processed cheese and regular peanut butter. ? Crackers with salted tops, and other salted snack foods such as pretzels, chips, and salted popcorn. ? Frozen prepared meals, unless labeled low-sodium. ? Canned and dried soups, broths, and bouillon, unless labeled sodium-free or low-sodium. ? Canned vegetables, unless labeled sodium-free or low-sodium. ? Western Madeline fries, pizza, tacos, and other fast foods. ? Pickles, olives, ketchup, and other condiments, especially soy sauce, unless labeled sodium-free or low-sodium. Where can you learn more? Go to https://Arkadinlamberto.[employer]. org and sign in to your Springbuk account. Enter R302 in the [facility] box to learn more about \"Low Sodium Diet (2,000 Milligram): Care Instructions. \"     If you do not have an account, please click on the \"Sign Up Now\" link. Current as of: December 17, 2020               Content Version: 12.8  © 6184-8684 Healthwise, Incorporated. Care instructions adapted under license by [facility] ([facility]).  If you have questions about a medical condition or this instruction, always ask your healthcare professional. [place] [address] any warranty or liability for your use of this information. Patient Education        Low Sodium Diet (2,000 Milligram): Care Instructions  Overview     Limiting sodium can be an important part of managing some health problems. The most common source of sodium is salt. People get most of the salt in their diet from canned, prepared, and packaged foods. Fast food and restaurant meals also are very high in sodium. Your doctor will probably limit your sodium to less than 2,000 milligrams (mg) a day. This limit counts all the sodium in prepared and packaged foods and any salt you add to your food. Follow-up care is a key part of your treatment and safety. Be sure to make and go to all appointments, and call your doctor if you are having problems. It's also a good idea to know your test results and keep a list of the medicines you take. How can you care for yourself at home? Read food labels  · Read labels on cans and food packages. The labels tell you how much sodium is in each serving. Make sure that you look at the serving size. If you eat more than the serving size, you have eaten more sodium. · Food labels also tell you the Percent Daily Value for sodium. Choose products with low Percent Daily Values for sodium. · Be aware that sodium can come in forms other than salt, including monosodium glutamate (MSG), sodium citrate, and sodium bicarbonate (baking soda). MSG is often added to Asian food. When you eat out, you can sometimes ask for food without MSG or added salt. Buy low-sodium foods  · Buy foods that are labeled \"unsalted\" (no salt added), \"sodium-free\" (less than 5 mg of sodium per serving), or \"low-sodium\" (140 mg or less of sodium per serving). Foods labeled \"reduced-sodium\" and \"light sodium\" may still have too much sodium. Be sure to read the label to see how much sodium you are getting. · Buy fresh vegetables, or frozen vegetables without added sauces.  Buy low-sodium versions of canned vegetables, soups, and other canned goods.  Prepare low-sodium meals  · Cut back on the amount of salt you use in cooking. This will help you adjust to the taste. Do not add salt after cooking. One teaspoon of salt has about 2,300 mg of sodium. · Take the salt shaker off the table. · Flavor your food with garlic, lemon juice, onion, vinegar, herbs, and spices. Do not use soy sauce, lite soy sauce, steak sauce, onion salt, garlic salt, celery salt, or ketchup on your food. · Use low-sodium salad dressings, sauces, and ketchup. Or make your own salad dressings and sauces without adding salt. · Use less salt (or none) when recipes call for it. You can often use half the salt a recipe calls for without losing flavor. Other foods such as rice, pasta, and grains do not need added salt. · Rinse canned vegetables, and cook them in fresh water. This removes somebut not allof the salt. · Avoid water that is naturally high in sodium or that has been treated with water softeners, which add sodium. If you buy bottled water, read the label and choose a sodium-free brand. Avoid high-sodium foods  · Avoid eating:  ? Smoked, cured, salted, and canned meat, fish, and poultry. ? Ham, fox, hot dogs, and luncheon meats. ? Regular, hard, and processed cheese and regular peanut butter. ? Crackers with salted tops, and other salted snack foods such as pretzels, chips, and salted popcorn. ? Frozen prepared meals, unless labeled low-sodium. ? Canned and dried soups, broths, and bouillon, unless labeled sodium-free or low-sodium. ? Canned vegetables, unless labeled sodium-free or low-sodium. ? Western Madeline fries, pizza, tacos, and other fast foods. ? Pickles, olives, ketchup, and other condiments, especially soy sauce, unless labeled sodium-free or low-sodium. Where can you learn more? Go to https://lili.healthSprooki. org and sign in to your [facility] account.  Enter O242 in the [facility] box to learn more about \"Low Sodium Diet (2,000 Milligram): Care Instructions. \"     If you do not have an account, please click on the \"Sign Up Now\" link. Current as of: December 17, 2020               Content Version: 12.8  © 8698-0201 Healthwise, Incorporated. Care instructions adapted under license by [facility] ([facility]). If you have questions about a medical condition or this instruction, always ask your healthcare professional. [address] any warranty or liability for your use of this information.

## 2021-03-31 NOTE — PROGRESS NOTES
3/31/2021     Vincent Dixon (:  1970) is a 44 y.o. female, here for evaluation of the following medical concerns:    Chief Complaint   Patient presents with    Follow-up     hypertension        HPI  Elevated BP : Patient is here today for hx of elevated BP readings int he office since January. She is watching her diet with a [employer] lionel , she has noted that her sodium intake is high. She is not very active but walks steps at work . Does use mobic once a day for discomfort from DDD. She will be having surgery in 6 weeks and will not be able to be weight bearing for a month. Denies any SOB, chest pain, LE edema, dizziness, headaches. Agrees to start meds to lower BP. Does have a wrist unit to take BP at home. Agrees to take BP at home . Review of Systems   Constitutional: Negative for appetite change, chills, fatigue and fever. HENT: Negative for trouble swallowing. Eyes: Negative for visual disturbance. Respiratory: Negative for shortness of breath. Cardiovascular: Negative for chest pain, palpitations and leg swelling. Musculoskeletal: Negative for myalgias. Neurological: Negative for dizziness and headaches. Psychiatric/Behavioral: Negative for dysphoric mood. The patient is not nervous/anxious. Prior to Visit Medications    Medication Sig Taking? Authorizing Provider   lisinopril (PRINIVIL;ZESTRIL) 10 MG tablet Take 1 tablet by mouth daily Yes MARKO Saavedra - CNP   meloxicam (MOBIC) 15 MG tablet  Yes Historical Provider, MD   triamcinolone (KENALOG) 0.1 % cream Apply topically 2 times daily. Yes Austen Esparza MD   vitamin D (ERGOCALCIFEROL) 1.25 MG (43022 UT) CAPS capsule TAKE 1 CAPSULE BY MOUTH 1 TIME A WEEK  Patient taking differently: Take 50,000 Units by mouth Twice a Week  Yes Austen Esparza MD        Social History     Tobacco Use    Smoking status: Never Smoker    Smokeless tobacco: Never Used   Substance Use Topics    Alcohol use:  No Vitals:    03/31/21 0817   BP: (!) 154/92   Pulse: 88   Temp: 97.7 °F (36.5 °C)   SpO2: 98%   Weight: 261 lb (118.4 kg)   Height: 5' 4\" (1.626 m)     Estimated body mass index is 44.8 kg/m² as calculated from the following:    Height as of this encounter: 5' 4\" (1.626 m). Weight as of this encounter: 261 lb (118.4 kg). Physical Exam  Vitals signs reviewed. Constitutional:       General: She is not in acute distress. Appearance: Normal appearance. She is obese. She is not ill-appearing, toxic-appearing or diaphoretic. HENT:      Head: Normocephalic and atraumatic. Neck:      Musculoskeletal: Normal range of motion. Cardiovascular:      Rate and Rhythm: Normal rate. Pulmonary:      Effort: Pulmonary effort is normal.   Skin:     General: Skin is warm and dry. Neurological:      General: No focal deficit present. Mental Status: She is alert and oriented to person, place, and time. Psychiatric:         Mood and Affect: Mood normal.         Behavior: Behavior normal.         ASSESSMENT/PLAN:  1. Elevated BP without diagnosis of hypertension  Will start lisinopril daily   Instructed to monitor BP daily and report findings in 4 weeks to Dr David Thompson  Discussed weight loss, watching salt intake , physical activity to lower BP . Patient verbalizes an understanding and agrees with plan of care. - lisinopril (PRINIVIL;ZESTRIL) 10 MG tablet; Take 1 tablet by mouth daily  Dispense: 90 tablet; Refill: 1      Return in about 4 weeks (around 4/28/2021). Will call and provide BP readings. All questions addresses and answered . Patient agrees with plan of care. An electronic signature was used to authenticate this note.     --MARKO Chavez - CNP on 3/31/2021 at 8:44 AM

## 2021-04-21 ENCOUNTER — HOSPITAL ENCOUNTER (OUTPATIENT)
Dept: WOMENS IMAGING | Age: 51
Discharge: HOME | End: 2021-04-21
Payer: COMMERCIAL

## 2021-04-21 DIAGNOSIS — Z12.31 VISIT FOR SCREENING MAMMOGRAM: ICD-10-CM

## 2021-04-21 PROCEDURE — 77067 SCR MAMMO BI INCL CAD: CPT

## 2021-04-23 ENCOUNTER — HOSPITAL ENCOUNTER (OUTPATIENT)
Dept: ULTRASOUND IMAGING | Age: 51
Discharge: HOME | End: 2021-04-23
Payer: COMMERCIAL

## 2021-04-23 ENCOUNTER — HOSPITAL ENCOUNTER (OUTPATIENT)
Dept: WOMENS IMAGING | Age: 51
Discharge: HOME | End: 2021-04-23
Payer: COMMERCIAL

## 2021-04-23 DIAGNOSIS — N63.0 LUMP OR MASS IN BREAST: ICD-10-CM

## 2021-04-23 DIAGNOSIS — R92.8 ABNORMAL MAMMOGRAM: ICD-10-CM

## 2021-04-23 PROCEDURE — G0279 TOMOSYNTHESIS, MAMMO: HCPCS

## 2021-04-23 PROCEDURE — 76642 ULTRASOUND BREAST LIMITED: CPT

## 2021-05-07 RX ORDER — ERGOCALCIFEROL 1.25 MG/1
50000 CAPSULE ORAL
Qty: 24 CAPSULE | Refills: 0 | Status: SHIPPED | OUTPATIENT
Start: 2021-05-10 | End: 2021-08-26 | Stop reason: SDUPTHER

## 2021-05-12 ENCOUNTER — OFFICE VISIT (OUTPATIENT)
Dept: INTERNAL MEDICINE CLINIC | Age: 51
End: 2021-05-12
Payer: COMMERCIAL

## 2021-05-12 VITALS
RESPIRATION RATE: 18 BRPM | OXYGEN SATURATION: 100 % | BODY MASS INDEX: 44.97 KG/M2 | DIASTOLIC BLOOD PRESSURE: 78 MMHG | HEART RATE: 93 BPM | WEIGHT: 262 LBS | SYSTOLIC BLOOD PRESSURE: 136 MMHG

## 2021-05-12 DIAGNOSIS — M21.611 BUNION, RIGHT FOOT: ICD-10-CM

## 2021-05-12 DIAGNOSIS — E78.2 MIXED HYPERLIPIDEMIA: ICD-10-CM

## 2021-05-12 DIAGNOSIS — R94.31 ABNORMAL EKG: ICD-10-CM

## 2021-05-12 DIAGNOSIS — I10 ESSENTIAL HYPERTENSION: ICD-10-CM

## 2021-05-12 DIAGNOSIS — Z01.818 PRE-OP EXAM: Primary | ICD-10-CM

## 2021-05-12 DIAGNOSIS — E55.9 VITAMIN D DEFICIENCY: ICD-10-CM

## 2021-05-12 PROBLEM — M25.512 ACUTE PAIN OF LEFT SHOULDER: Status: RESOLVED | Noted: 2018-06-05 | Resolved: 2021-05-12

## 2021-05-12 PROBLEM — M54.42 ACUTE LEFT-SIDED LOW BACK PAIN WITH LEFT-SIDED SCIATICA: Status: RESOLVED | Noted: 2018-07-19 | Resolved: 2021-05-12

## 2021-05-12 PROBLEM — M54.50 LUMBAR SPINE PAIN: Status: RESOLVED | Noted: 2018-07-19 | Resolved: 2021-05-12

## 2021-05-12 PROBLEM — M76.32 ILIOTIBIAL BAND SYNDROME OF LEFT SIDE: Status: RESOLVED | Noted: 2018-07-19 | Resolved: 2021-05-12

## 2021-05-12 PROBLEM — M22.42 CHONDROMALACIA PATELLAE OF LEFT KNEE: Status: RESOLVED | Noted: 2018-07-19 | Resolved: 2021-05-12

## 2021-05-12 PROBLEM — M25.562 LEFT KNEE PAIN: Status: RESOLVED | Noted: 2018-07-19 | Resolved: 2021-05-12

## 2021-05-12 PROBLEM — M54.50 ACUTE BILATERAL LOW BACK PAIN WITHOUT SCIATICA: Status: RESOLVED | Noted: 2018-12-18 | Resolved: 2021-05-12

## 2021-05-12 PROBLEM — M75.22 BICEPS TENDONITIS ON LEFT: Status: RESOLVED | Noted: 2018-06-05 | Resolved: 2021-05-12

## 2021-05-12 PROCEDURE — 93000 ELECTROCARDIOGRAM COMPLETE: CPT | Performed by: INTERNAL MEDICINE

## 2021-05-12 PROCEDURE — 99243 OFF/OP CNSLTJ NEW/EST LOW 30: CPT | Performed by: INTERNAL MEDICINE

## 2021-05-12 RX ORDER — MELOXICAM 15 MG/1
15 TABLET ORAL DAILY
Qty: 30 TABLET | Refills: 5 | Status: SHIPPED | OUTPATIENT
Start: 2021-05-12 | End: 2021-11-19 | Stop reason: SDUPTHER

## 2021-05-12 ASSESSMENT — ENCOUNTER SYMPTOMS
EYES NEGATIVE: 1
RESPIRATORY NEGATIVE: 1
ALLERGIC/IMMUNOLOGIC NEGATIVE: 1
GASTROINTESTINAL NEGATIVE: 1

## 2021-05-12 NOTE — PROGRESS NOTES
Subjective:    cc:  preop exam  Patient ID: Mariely Rahman is a 44 y.o. female. HPI  Referred by Dr Juanjose Alvarez for preop physical for right foot surgery. Scheduled 5/21/21 at [facility]. Having bunionectomy , hammer toe repair and arthroplasty. Having significant pain and getting progressively worse. Review of Systems   Constitutional: Negative. HENT: Negative. Eyes: Negative. Respiratory: Negative. Cardiovascular: Negative. Gastrointestinal: Negative. Endocrine: Negative. Genitourinary: Negative. Musculoskeletal: Negative. Skin: Negative. Allergic/Immunologic: Negative. Neurological: Negative. Hematological: Negative. Psychiatric/Behavioral: Negative. Allergies   Allergen Reactions    Tetanus Toxoids Other (See Comments)     Hot, swollen, tender       Current Outpatient Medications:     meloxicam (MOBIC) 15 MG tablet, Take 1 tablet by mouth daily, Disp: 30 tablet, Rfl: 5    vitamin D (ERGOCALCIFEROL) 1.25 MG (25608 UT) CAPS capsule, Take 1 capsule by mouth Twice a Week, Disp: 24 capsule, Rfl: 0    lisinopril (PRINIVIL;ZESTRIL) 10 MG tablet, Take 1 tablet by mouth daily, Disp: 90 tablet, Rfl: 1    triamcinolone (KENALOG) 0.1 % cream, Apply topically 2 times daily. , Disp: 30 g, Rfl: 2  Past Medical History:   Diagnosis Date    HIGH CHOLESTEROL     Hyperlipidemia     Hypertension     Vitamin D deficiency      Past Surgical History:   Procedure Laterality Date    CHOLECYSTECTOMY  1998    ENDOMETRIAL ABLATION      VENTRAL HERNIA REPAIR     no complications with anesthesia  No h/o dvt or pe.        Social History     Socioeconomic History    Marital status:      Spouse name: Not on file    Number of children: Not on file    Years of education: Not on file    Highest education level: Not on file   Occupational History    Not on file   Social Needs    Financial resource strain: Not hard at all    Food insecurity     Worry: Never true     Inability: Never true    Transportation needs     Medical: No     Non-medical: No   Tobacco Use    Smoking status: Never Smoker    Smokeless tobacco: Never Used   Substance and Sexual Activity    Alcohol use: No    Drug use: No    Sexual activity: Yes     Partners: Male   Lifestyle    Physical activity     Days per week: Not on file     Minutes per session: Not on file    Stress: Not on file   Relationships    Social connections     Talks on phone: Not on file     Gets together: Not on file     Attends Druze service: Not on file     Active member of club or organization: Not on file     Attends meetings of clubs or organizations: Not on file     Relationship status: Not on file    Intimate partner violence     Fear of current or ex partner: Not on file     Emotionally abused: Not on file     Physically abused: Not on file     Forced sexual activity: Not on file   Other Topics Concern    Not on file   Social History Narrative    Not on file     family history includes COPD in her mother; Cancer in her father, maternal grandfather, mother, and paternal grandmother; High Blood Pressure in her father and mother; High Cholesterol in her father and mother; Other in her maternal grandmother; Stroke in her paternal grandfather. /78 (Site: Right Upper Arm, Position: Sitting, Cuff Size: Large Adult)   Pulse 93   Resp 18   Wt 262 lb (118.8 kg)   SpO2 100%   BMI 44.97 kg/m²     Objective:   Physical Exam  Constitutional:       General: She is not in acute distress. Appearance: Normal appearance. She is well-developed. She is not ill-appearing. HENT:      Head: Normocephalic and atraumatic. Right Ear: Tympanic membrane, ear canal and external ear normal.      Left Ear: Tympanic membrane, ear canal and external ear normal.      Nose: Nose normal.      Mouth/Throat:      Mouth: Mucous membranes are moist.      Pharynx: Oropharynx is clear. No oropharyngeal exudate.    Eyes: General: No scleral icterus. Right eye: No discharge. Left eye: No discharge. Extraocular Movements: Extraocular movements intact. Conjunctiva/sclera: Conjunctivae normal.      Pupils: Pupils are equal, round, and reactive to light. Neck:      Musculoskeletal: Normal range of motion and neck supple. Thyroid: No thyromegaly. Vascular: No carotid bruit or JVD. Cardiovascular:      Rate and Rhythm: Normal rate and regular rhythm. Pulses: Normal pulses. Heart sounds: Normal heart sounds. No murmur. No friction rub. No gallop. Pulmonary:      Effort: Pulmonary effort is normal.      Breath sounds: Normal breath sounds. No wheezing, rhonchi or rales. Abdominal:      General: Abdomen is flat. Bowel sounds are normal. There is no distension. Palpations: Abdomen is soft. There is no mass. Tenderness: There is no abdominal tenderness. There is no guarding or rebound. Genitourinary:     Vagina: Normal.   Musculoskeletal: Normal range of motion. General: No swelling or tenderness. Right lower leg: No edema. Left lower leg: No edema. Lymphadenopathy:      Cervical: No cervical adenopathy. Skin:     General: Skin is warm and dry. Coloration: Skin is not jaundiced. Findings: No erythema. Neurological:      General: No focal deficit present. Mental Status: She is alert and oriented to person, place, and time. Deep Tendon Reflexes: Reflexes are normal and symmetric. Psychiatric:         Mood and Affect: Mood normal.         Behavior: Behavior normal.         Thought Content: Thought content normal.         Judgment: Judgment normal.         Assessment:      1. Pre-op exam    2. Bunion, right foot    3. Essential hypertension    4. Mixed hyperlipidemia    5. Vitamin D deficiency    6.  Abnormal EKG            Plan:      Halie Lockett was seen today for pre-op exam.    Diagnoses and all orders for this visit:    Pre-op exam:  Pt with possible old ant MI on ekg. No ekgs to compare. Will get stress test and if that is normal, then may proceed with surgery. -     EKG 12 Lead    Bunion, right foot    Essential hypertension:  Stable on current medications    Mixed hyperlipidemia:  Stable on current medications    Vitamin D deficiency:  Stable on current medications    Other orders  -     meloxicam (MOBIC) 15 MG tablet;  Take 1 tablet by mouth daily              Jessi Wells MD

## 2021-05-19 ENCOUNTER — HOSPITAL ENCOUNTER (OUTPATIENT)
Dept: NON INVASIVE DIAGNOSTICS | Age: 51
Discharge: HOME | End: 2021-05-19
Payer: COMMERCIAL

## 2021-05-19 ENCOUNTER — TELEPHONE (OUTPATIENT)
Dept: INTERNAL MEDICINE CLINIC | Age: 51
End: 2021-05-19

## 2021-05-19 DIAGNOSIS — Z01.818 PRE-OP EXAM: ICD-10-CM

## 2021-05-19 DIAGNOSIS — R94.31 ABNORMAL EKG: ICD-10-CM

## 2021-05-19 LAB
LV EF: 64 %
LVEF MODALITY: NORMAL

## 2021-05-19 PROCEDURE — A9502 TC99M TETROFOSMIN: HCPCS | Performed by: INTERNAL MEDICINE

## 2021-05-19 PROCEDURE — 2580000003 HC RX 258: Performed by: INTERNAL MEDICINE

## 2021-05-19 PROCEDURE — 93017 CV STRESS TEST TRACING ONLY: CPT

## 2021-05-19 PROCEDURE — 78452 HT MUSCLE IMAGE SPECT MULT: CPT

## 2021-05-19 PROCEDURE — 3430000000 HC RX DIAGNOSTIC RADIOPHARMACEUTICAL: Performed by: INTERNAL MEDICINE

## 2021-05-19 RX ORDER — SODIUM CHLORIDE 0.9 % (FLUSH) 0.9 %
10 SYRINGE (ML) INJECTION 2 TIMES DAILY
Status: DISCONTINUED | OUTPATIENT
Start: 2021-05-19 | End: 2021-05-20 | Stop reason: HOSPADM

## 2021-05-19 RX ADMIN — TETROFOSMIN 36 MILLICURIE: 1.38 INJECTION, POWDER, LYOPHILIZED, FOR SOLUTION INTRAVENOUS at 09:35

## 2021-05-19 RX ADMIN — Medication 10 ML: at 08:18

## 2021-05-19 RX ADMIN — TETROFOSMIN 11.36 MILLICURIE: 1.38 INJECTION, POWDER, LYOPHILIZED, FOR SOLUTION INTRAVENOUS at 08:18

## 2021-05-19 RX ADMIN — Medication 10 ML: at 09:36

## 2021-05-19 NOTE — TELEPHONE ENCOUNTER
Kenny Galvan calling to inform Dr. Eliana Danielson that she has completed the stress test today.     Please Advise

## 2021-05-20 ENCOUNTER — TELEPHONE (OUTPATIENT)
Dept: INTERNAL MEDICINE CLINIC | Age: 51
End: 2021-05-20

## 2021-08-26 ENCOUNTER — TELEPHONE (OUTPATIENT)
Dept: INTERNAL MEDICINE CLINIC | Age: 51
End: 2021-08-26

## 2021-08-26 DIAGNOSIS — E55.9 VITAMIN D DEFICIENCY: Primary | ICD-10-CM

## 2021-08-26 RX ORDER — ERGOCALCIFEROL 1.25 MG/1
50000 CAPSULE ORAL
Qty: 24 CAPSULE | Refills: 0 | Status: SHIPPED | OUTPATIENT
Start: 2021-08-26 | End: 2022-01-24 | Stop reason: SDUPTHER

## 2021-08-26 RX ORDER — ERGOCALCIFEROL 1.25 MG/1
50000 CAPSULE ORAL
Qty: 24 CAPSULE | Refills: 0 | Status: SHIPPED | OUTPATIENT
Start: 2021-08-26 | End: 2021-08-26 | Stop reason: SDUPTHER

## 2021-08-26 NOTE — TELEPHONE ENCOUNTER
Let patient know that I refilled her vitamin D prescription. She has been on the high dose for quite a while. I placed an order for her to come in to get a vitamin D level at the office to see if she still needs that dose or if we can reduce it to once a week.   The prescription was called in for twice weekly

## 2021-08-26 NOTE — TELEPHONE ENCOUNTER
PT's  had called questioning why the refill for the vitamin D (ERGOCALCIFEROL) 1.25 MG (00585 UT) CAPS capsule was rejected and would like to know the next steps to getting that refilled.

## 2021-09-01 ENCOUNTER — NURSE ONLY (OUTPATIENT)
Dept: INTERNAL MEDICINE CLINIC | Age: 51
End: 2021-09-01
Payer: COMMERCIAL

## 2021-09-01 DIAGNOSIS — E55.9 VITAMIN D DEFICIENCY: ICD-10-CM

## 2021-09-01 LAB — VITAMIN D 25-HYDROXY: 41.4 NG/ML

## 2021-09-01 PROCEDURE — 36415 COLL VENOUS BLD VENIPUNCTURE: CPT | Performed by: INTERNAL MEDICINE

## 2021-09-21 DIAGNOSIS — R03.0 ELEVATED BP WITHOUT DIAGNOSIS OF HYPERTENSION: ICD-10-CM

## 2021-09-21 RX ORDER — LISINOPRIL 10 MG/1
10 TABLET ORAL DAILY
Qty: 90 TABLET | Refills: 1 | Status: SHIPPED | OUTPATIENT
Start: 2021-09-21 | End: 2022-03-14

## 2021-11-19 DIAGNOSIS — M51.36 DDD (DEGENERATIVE DISC DISEASE), LUMBAR: Primary | ICD-10-CM

## 2021-11-19 RX ORDER — MELOXICAM 15 MG/1
15 TABLET ORAL DAILY
Qty: 30 TABLET | Refills: 0 | Status: SHIPPED | OUTPATIENT
Start: 2021-11-19 | End: 2021-12-17

## 2021-12-17 DIAGNOSIS — M51.36 DDD (DEGENERATIVE DISC DISEASE), LUMBAR: ICD-10-CM

## 2021-12-17 RX ORDER — MELOXICAM 15 MG/1
TABLET ORAL
Qty: 30 TABLET | Refills: 1 | Status: SHIPPED | OUTPATIENT
Start: 2021-12-17 | End: 2022-02-14

## 2021-12-17 NOTE — TELEPHONE ENCOUNTER
Last office visit : 05/12/2021 with makayla - not been seen by anyone else in the office . No future appointments.

## 2022-01-24 DIAGNOSIS — E55.9 VITAMIN D DEFICIENCY: ICD-10-CM

## 2022-01-24 RX ORDER — ERGOCALCIFEROL 1.25 MG/1
50000 CAPSULE ORAL
Qty: 24 CAPSULE | Refills: 0 | Status: SHIPPED | OUTPATIENT
Start: 2022-01-24 | End: 2022-04-13

## 2022-01-24 RX ORDER — ERGOCALCIFEROL 1.25 MG/1
50000 CAPSULE ORAL
Qty: 24 CAPSULE | Refills: 0 | Status: SHIPPED | OUTPATIENT
Start: 2022-01-24 | End: 2022-01-24 | Stop reason: SDUPTHER

## 2022-01-24 NOTE — TELEPHONE ENCOUNTER
----- Message from [place] sent at 1/24/2022  5:15 PM EST -----  Subject: Refill Request    QUESTIONS  Name of Medication? vitamin D (ERGOCALCIFEROL) 1.25 MG (98340 UT) CAPS   capsule  Patient-reported dosage and instructions? 1.25 MG (04421 UT) CAPS capsule,   Take 1 capsule by mouth Twice a Week Come to office for blood level this   week  How many days do you have left? 0  Preferred [facility]acy? [id number]  Pharmacy phone number (if available)? [phone]  ---------------------------------------------------------------------------  --------------  Arian ROTH  What is the best way for the office to contact you? OK to leave message on   voicemail  Preferred Call Back Phone Number?  [phone]

## 2022-02-12 DIAGNOSIS — M51.36 DDD (DEGENERATIVE DISC DISEASE), LUMBAR: ICD-10-CM

## 2022-02-15 RX ORDER — MELOXICAM 15 MG/1
TABLET ORAL
Qty: 30 TABLET | Refills: 0 | Status: SHIPPED | OUTPATIENT
Start: 2022-02-15 | End: 2022-03-14

## 2022-03-14 DIAGNOSIS — R03.0 ELEVATED BP WITHOUT DIAGNOSIS OF HYPERTENSION: ICD-10-CM

## 2022-03-14 DIAGNOSIS — M51.36 DDD (DEGENERATIVE DISC DISEASE), LUMBAR: ICD-10-CM

## 2022-03-17 ENCOUNTER — TELEPHONE (OUTPATIENT)
Dept: INTERNAL MEDICINE CLINIC | Age: 52
End: 2022-03-17

## 2022-03-17 DIAGNOSIS — E78.2 MIXED HYPERLIPIDEMIA: ICD-10-CM

## 2022-03-17 DIAGNOSIS — I10 ESSENTIAL HYPERTENSION: Primary | ICD-10-CM

## 2022-03-17 DIAGNOSIS — Z11.59 ENCOUNTER FOR HEPATITIS C SCREENING TEST FOR LOW RISK PATIENT: ICD-10-CM

## 2022-03-17 RX ORDER — LISINOPRIL 10 MG/1
10 TABLET ORAL DAILY
Qty: 30 TABLET | Refills: 0 | Status: SHIPPED | OUTPATIENT
Start: 2022-03-17 | End: 2022-04-13

## 2022-03-17 RX ORDER — MELOXICAM 15 MG/1
TABLET ORAL
Qty: 30 TABLET | Refills: 0 | Status: SHIPPED | OUTPATIENT
Start: 2022-03-17 | End: 2022-04-13

## 2022-03-17 RX ORDER — ONDANSETRON 4 MG/1
TABLET, ORALLY DISINTEGRATING ORAL
COMMUNITY
Start: 2022-02-07 | End: 2022-04-06

## 2022-03-17 NOTE — TELEPHONE ENCOUNTER
----- Message from Evelia Chanel MD sent at 3/17/2022  7:06 AM EDT -----  Let patient know that lisinopril was refilled for 1 month. She is overdue for labs, kidney function test to make sure medication is agreeing with her.   I also have her choose a PCP and schedule an appointment for her yearly exam.

## 2022-04-05 SDOH — HEALTH STABILITY: PHYSICAL HEALTH: ON AVERAGE, HOW MANY DAYS PER WEEK DO YOU ENGAGE IN MODERATE TO STRENUOUS EXERCISE (LIKE A BRISK WALK)?: 0 DAYS

## 2022-04-06 ENCOUNTER — OFFICE VISIT (OUTPATIENT)
Dept: INTERNAL MEDICINE CLINIC | Age: 52
End: 2022-04-06
Payer: COMMERCIAL

## 2022-04-06 VITALS
TEMPERATURE: 97.4 F | SYSTOLIC BLOOD PRESSURE: 136 MMHG | WEIGHT: 250 LBS | BODY MASS INDEX: 42.91 KG/M2 | HEART RATE: 76 BPM | OXYGEN SATURATION: 100 % | RESPIRATION RATE: 16 BRPM | DIASTOLIC BLOOD PRESSURE: 78 MMHG

## 2022-04-06 DIAGNOSIS — E55.9 VITAMIN D DEFICIENCY: ICD-10-CM

## 2022-04-06 DIAGNOSIS — I10 ESSENTIAL HYPERTENSION: ICD-10-CM

## 2022-04-06 DIAGNOSIS — R21 RASH: ICD-10-CM

## 2022-04-06 DIAGNOSIS — E78.2 MIXED HYPERLIPIDEMIA: ICD-10-CM

## 2022-04-06 DIAGNOSIS — Z11.59 ENCOUNTER FOR HEPATITIS C SCREENING TEST FOR LOW RISK PATIENT: ICD-10-CM

## 2022-04-06 DIAGNOSIS — Z01.419 WELL WOMAN EXAM: ICD-10-CM

## 2022-04-06 DIAGNOSIS — M89.8X1 CLAVICLE PAIN: ICD-10-CM

## 2022-04-06 DIAGNOSIS — M25.512 LEFT SHOULDER PAIN, UNSPECIFIED CHRONICITY: ICD-10-CM

## 2022-04-06 DIAGNOSIS — Z12.31 ENCOUNTER FOR SCREENING MAMMOGRAM FOR BREAST CANCER: Primary | ICD-10-CM

## 2022-04-06 LAB
A/G RATIO: 2.1 (ref 1.1–2.2)
ALBUMIN SERPL-MCNC: 4.6 G/DL (ref 3.4–5)
ALP BLD-CCNC: 88 U/L (ref 40–129)
ALT SERPL-CCNC: 14 U/L (ref 10–40)
ANION GAP SERPL CALCULATED.3IONS-SCNC: 13 MMOL/L (ref 3–16)
AST SERPL-CCNC: 16 U/L (ref 15–37)
BASOPHILS ABSOLUTE: 0 K/UL (ref 0–0.2)
BASOPHILS RELATIVE PERCENT: 0.7 %
BILIRUB SERPL-MCNC: <0.2 MG/DL (ref 0–1)
BILIRUBIN URINE: NEGATIVE
BLOOD, URINE: NEGATIVE
BUN BLDV-MCNC: 23 MG/DL (ref 7–20)
CALCIUM SERPL-MCNC: 10.1 MG/DL (ref 8.3–10.6)
CHLORIDE BLD-SCNC: 102 MMOL/L (ref 99–110)
CHOLESTEROL, TOTAL: 195 MG/DL (ref 0–199)
CLARITY: CLEAR
CO2: 25 MMOL/L (ref 21–32)
COLOR: YELLOW
CREAT SERPL-MCNC: 0.8 MG/DL (ref 0.6–1.1)
EOSINOPHILS ABSOLUTE: 0.1 K/UL (ref 0–0.6)
EOSINOPHILS RELATIVE PERCENT: 1.7 %
GFR AFRICAN AMERICAN: >60
GFR NON-AFRICAN AMERICAN: >60
GLUCOSE BLD-MCNC: 103 MG/DL (ref 70–99)
GLUCOSE URINE: NEGATIVE MG/DL
HCT VFR BLD CALC: 33.9 % (ref 36–48)
HDLC SERPL-MCNC: 56 MG/DL (ref 40–60)
HEMOGLOBIN: 11.3 G/DL (ref 12–16)
HEPATITIS C ANTIBODY INTERPRETATION: NORMAL
KETONES, URINE: NEGATIVE MG/DL
LDL CHOLESTEROL CALCULATED: 113 MG/DL
LEUKOCYTE ESTERASE, URINE: NEGATIVE
LYMPHOCYTES ABSOLUTE: 1.3 K/UL (ref 1–5.1)
LYMPHOCYTES RELATIVE PERCENT: 21.5 %
MCH RBC QN AUTO: 29 PG (ref 26–34)
MCHC RBC AUTO-ENTMCNC: 33.4 G/DL (ref 31–36)
MCV RBC AUTO: 86.8 FL (ref 80–100)
MICROSCOPIC EXAMINATION: NORMAL
MONOCYTES ABSOLUTE: 0.4 K/UL (ref 0–1.3)
MONOCYTES RELATIVE PERCENT: 6.5 %
NEUTROPHILS ABSOLUTE: 4.3 K/UL (ref 1.7–7.7)
NEUTROPHILS RELATIVE PERCENT: 69.6 %
NITRITE, URINE: NEGATIVE
PDW BLD-RTO: 14.4 % (ref 12.4–15.4)
PH UA: 6 (ref 5–8)
PLATELET # BLD: 328 K/UL (ref 135–450)
PMV BLD AUTO: 9.9 FL (ref 5–10.5)
POTASSIUM SERPL-SCNC: 5.2 MMOL/L (ref 3.5–5.1)
PROTEIN UA: NEGATIVE MG/DL
RBC # BLD: 3.91 M/UL (ref 4–5.2)
SODIUM BLD-SCNC: 140 MMOL/L (ref 136–145)
SPECIFIC GRAVITY UA: <=1.005 (ref 1–1.03)
TOTAL PROTEIN: 6.8 G/DL (ref 6.4–8.2)
TRIGL SERPL-MCNC: 128 MG/DL (ref 0–150)
TSH REFLEX FT4: 2.37 UIU/ML (ref 0.27–4.2)
URINE TYPE: NORMAL
UROBILINOGEN, URINE: 0.2 E.U./DL
VITAMIN D 25-HYDROXY: 48.6 NG/ML
VLDLC SERPL CALC-MCNC: 26 MG/DL
WBC # BLD: 6.2 K/UL (ref 4–11)

## 2022-04-06 PROCEDURE — 81003 URINALYSIS AUTO W/O SCOPE: CPT | Performed by: INTERNAL MEDICINE

## 2022-04-06 PROCEDURE — 99214 OFFICE O/P EST MOD 30 MIN: CPT | Performed by: INTERNAL MEDICINE

## 2022-04-06 RX ORDER — MOMETASONE FUROATE 1 MG/G
CREAM TOPICAL
Qty: 15 G | Refills: 1 | Status: SHIPPED | OUTPATIENT
Start: 2022-04-06

## 2022-04-06 SDOH — ECONOMIC STABILITY: FOOD INSECURITY: WITHIN THE PAST 12 MONTHS, YOU WORRIED THAT YOUR FOOD WOULD RUN OUT BEFORE YOU GOT MONEY TO BUY MORE.: NEVER TRUE

## 2022-04-06 SDOH — ECONOMIC STABILITY: FOOD INSECURITY: WITHIN THE PAST 12 MONTHS, THE FOOD YOU BOUGHT JUST DIDN'T LAST AND YOU DIDN'T HAVE MONEY TO GET MORE.: NEVER TRUE

## 2022-04-06 ASSESSMENT — ENCOUNTER SYMPTOMS
GASTROINTESTINAL NEGATIVE: 1
EYES NEGATIVE: 1
ALLERGIC/IMMUNOLOGIC NEGATIVE: 1
RESPIRATORY NEGATIVE: 1

## 2022-04-06 ASSESSMENT — PATIENT HEALTH QUESTIONNAIRE - PHQ9
SUM OF ALL RESPONSES TO PHQ9 QUESTIONS 1 & 2: 0
SUM OF ALL RESPONSES TO PHQ QUESTIONS 1-9: 0
2. FEELING DOWN, DEPRESSED OR HOPELESS: 0
SUM OF ALL RESPONSES TO PHQ QUESTIONS 1-9: 0
1. LITTLE INTEREST OR PLEASURE IN DOING THINGS: 0

## 2022-04-06 ASSESSMENT — SOCIAL DETERMINANTS OF HEALTH (SDOH): HOW HARD IS IT FOR YOU TO PAY FOR THE VERY BASICS LIKE FOOD, HOUSING, MEDICAL CARE, AND HEATING?: NOT HARD AT ALL

## 2022-04-06 NOTE — PROGRESS NOTES
2022    Dante Muñoz (:  1970) is a 46 y.o. female, here to establish care. Shoulder Pain   The pain is present in the left shoulder. This is a new problem. The current episode started more than 1 month ago. The problem occurs intermittently (Pain occurs when patient abducts to about 85 to 90 degrees and reaches across chest.  She feels like something partially dislocates and when she is able to get it back in position the pain resolves. ). The pain is at a severity of 4/10. The pain is moderate. Pertinent negatives include no fever, inability to bear weight, itching, joint locking, joint swelling, limited range of motion, numbness, stiffness or tingling. The symptoms are aggravated by activity. Treatments tried: Patient adjust her shoulder position and it feels like it pops into place and pain resolves. The treatment provided significant relief. Family history does not include gout or rheumatoid arthritis. Hypertension  This is a chronic problem. The current episode started more than 1 year ago. The problem is unchanged. The problem is controlled. Pertinent negatives include no anxiety, blurred vision, headaches, malaise/fatigue, neck pain, orthopnea, palpitations, peripheral edema, PND or shortness of breath. There are no associated agents to hypertension. Past treatments include ACE inhibitors. The current treatment provides significant improvement. Compliance problems include exercise and diet. There is no history of CVA. Rash  Chronicity: Recurring rash where patient gets these red itchy raised macules that last for few days to week and resolves. They occur in different areas neck arms. The current episode started more than 1 month ago. The problem has been waxing and waning since onset. Location: Neck and arms. It is unknown if there was an exposure to a precipitant. Pertinent negatives include no fever or shortness of breath. Past treatments include nothing.  The treatment provided no relief. Patient Active Problem List   Diagnosis    Acquired deformity of toe    Mixed hyperlipidemia    DJD of left AC (acromioclavicular) joint    Clavicle pain    Primary osteoarthritis of left knee    DDD (degenerative disc disease), lumbar    Lumbar facet joint syndrome    Well woman exam    Essential hypertension    Vitamin D deficiency    Rash       Review of Systems   Constitutional: Negative. Negative for fever and malaise/fatigue. HENT: Negative. Eyes: Negative. Negative for blurred vision. Respiratory: Negative. Negative for shortness of breath. Cardiovascular: Negative. Negative for palpitations, orthopnea and PND. Gastrointestinal: Negative. Endocrine: Negative. Some hot flashes    Menopause since 2021. Genitourinary: Negative. Musculoskeletal: Negative. Negative for neck pain and stiffness. Left shoulder pops and hurts off and on for two months. When reaching across and up has pain. No history of injury or swelling. Skin: Positive for rash. Negative for itching. Allergic/Immunologic: Negative. Neurological: Negative. Negative for tingling, numbness and headaches. Hematological: Negative. Psychiatric/Behavioral: Negative. Prior to Visit Medications    Medication Sig Taking? Authorizing Provider   mometasone (ELOCON) 0.1 % cream Apply topically daily to rash prn Yes Owen Strong MD   lisinopril (PRINIVIL;ZESTRIL) 10 MG tablet TAKE 1 TABLET BY MOUTH DAILY Yes Owen Strong MD   meloxicam (MOBIC) 15 MG tablet TAKE 1 TABLET BY MOUTH DAILY Yes Owen Strong MD   vitamin D (ERGOCALCIFEROL) 1.25 MG (82150 UT) CAPS capsule Take 1 capsule by mouth Twice a Week Come to office for blood level this week Yes Owen Strong MD   triamcinolone (KENALOG) 0.1 % cream Apply topically 2 times daily.  Yes Marissa Rodas MD        Allergies   Allergen Reactions    Tetanus Toxoids Other (See Comments) Hot, swollen, tender       Past Medical History:   Diagnosis Date    HIGH CHOLESTEROL     Hyperlipidemia     Hypertension     Vitamin D deficiency        Past Surgical History:   Procedure Laterality Date    CHOLECYSTECTOMY  1998    ENDOMETRIAL ABLATION      FOOT SURGERY Right 05/2021    bunion, hammer toe, metatarsal surgery    VENTRAL HERNIA REPAIR         Social History     Socioeconomic History    Marital status:      Spouse name: Not on file    Number of children: Not on file    Years of education: Not on file    Highest education level: Not on file   Occupational History    Not on file   Tobacco Use    Smoking status: Never Smoker    Smokeless tobacco: Never Used   Vaping Use    Vaping Use: Never used   Substance and Sexual Activity    Alcohol use: No    Drug use: No    Sexual activity: Yes     Partners: Male   Other Topics Concern    Not on file   Social History Narrative    Not on file     Social Determinants of Health     Financial Resource Strain: Low Risk     Difficulty of Paying Living Expenses: Not hard at all   Food Insecurity: No Food Insecurity    Worried About Running Out of Food in the Last Year: Never true    Anastasia of Food in the Last Year: Never true   Transportation Needs:     Lack of Transportation (Medical): Not on file    Lack of Transportation (Non-Medical):  Not on file   Physical Activity: Unknown    Days of Exercise per Week: 0 days    Minutes of Exercise per Session: Not on file   Stress:     Feeling of Stress : Not on file   Social Connections:     Frequency of Communication with Friends and Family: Not on file    Frequency of Social Gatherings with Friends and Family: Not on file    Attends Islam Services: Not on file    Active Member of Clubs or Organizations: Not on file    Attends Club or Organization Meetings: Not on file    Marital Status: Not on file   Intimate Partner Violence: Not At Risk    Fear of Current or Ex-Partner: No    Emotionally Abused: No    Physically Abused: No    Sexually Abused: No   Housing Stability:     Unable to Pay for Housing in the Last Year: Not on file    Number of Places Lived in the Last Year: Not on file    Unstable Housing in the Last Year: Not on file        Family History   Problem Relation Age of Onset    High Blood Pressure Mother     High Cholesterol Mother     COPD Mother     Cancer Mother         lung    High Blood Pressure Father     High Cholesterol Father     Cancer Father         renal cancer, lung cancer, brain ca    Cancer Maternal Grandfather     Cancer Paternal Grandmother     Other Maternal Grandmother         chf    Stroke Paternal Grandfather        ADVANCE DIRECTIVE: N, <no information>    Vitals:    04/06/22 1503   BP: 136/78   Site: Right Upper Arm   Position: Sitting   Cuff Size: Medium Adult   Pulse: 76   Resp: 16   Temp: 97.4 °F (36.3 °C)   SpO2: 100%   Weight: 250 lb (113.4 kg)     Estimated body mass index is 42.91 kg/m² as calculated from the following:    Height as of 3/31/21: 5' 4\" (1.626 m). Weight as of this encounter: 250 lb (113.4 kg). Physical Exam  Constitutional:       General: She is not in acute distress. Appearance: Normal appearance. She is not ill-appearing, toxic-appearing or diaphoretic. HENT:      Head: Normocephalic and atraumatic. Eyes:      General:         Right eye: No discharge. Extraocular Movements: Extraocular movements intact. Conjunctiva/sclera: Conjunctivae normal.      Pupils: Pupils are equal, round, and reactive to light. Cardiovascular:      Rate and Rhythm: Normal rate. Heart sounds: Normal heart sounds. Pulmonary:      Effort: Pulmonary effort is normal.      Breath sounds: Normal breath sounds. Abdominal:      General: Abdomen is flat. Bowel sounds are normal. There is no distension. Palpations: Abdomen is soft. There is no mass. Tenderness: There is no abdominal tenderness.  There is no right CVA tenderness or guarding. Musculoskeletal:      Cervical back: Normal range of motion and neck supple. Right lower leg: No edema. Left lower leg: No edema. Skin:     General: Skin is warm and dry. Neurological:      General: No focal deficit present. Mental Status: She is alert and oriented to person, place, and time. Cranial Nerves: No cranial nerve deficit. Gait: Gait normal.   Psychiatric:         Mood and Affect: Mood normal.         Behavior: Behavior normal.         Thought Content: Thought content normal.         Judgment: Judgment normal.         No flowsheet data found.     Lab Results   Component Value Date    CHOL 195 04/06/2022    CHOL 209 02/24/2021    CHOL 201 10/23/2019    TRIG 128 04/06/2022    TRIG 69 02/24/2021    TRIG 115 10/23/2019    HDL 56 04/06/2022    HDL 58 02/24/2021    HDL 65 10/23/2019    LDLCALC 113 04/06/2022    LDLCALC 137 02/24/2021    LDLCALC 113 10/23/2019    GLUCOSE 103 04/06/2022    LABA1C 5.5 02/24/2021    LABA1C 5.4 10/23/2019       The 10-year ASCVD risk score (Lukasz Parker et al., 2013) is: 2.4%    Values used to calculate the score:      Age: 46 years      Sex: Female      Is Non- : No      Diabetic: No      Tobacco smoker: No      Systolic Blood Pressure: 842 mmHg      Is BP treated: Yes      HDL Cholesterol: 56 mg/dL      Total Cholesterol: 195 mg/dL    Immunization History   Administered Date(s) Administered    COVID-19, Moderna, Primary or Immunocompromised, PF, 100mcg/0.5mL 02/12/2021, 03/12/2021    DTaP 06/01/2008    Influenza Vaccine, unspecified formulation 10/13/2016    Influenza Virus Vaccine 10/14/2019, 10/08/2020    Influenza, Intradermal, Preservative free 12/19/2013    Influenza, Quadv, IM, (6 mo and older Fluzone, Flulaval, Fluarix and 3 yrs and older Afluria) 10/14/2019    Pneumococcal Conjugate 13-valent (Lodema Nitin) 10/14/2019       Health Maintenance   Topic Date Due    Cervical cancer screen  Never done    Shingles Vaccine (1 of 2) Never done    COVID-19 Vaccine (3 - Booster for Moderna series) 08/12/2021    Colorectal Cancer Screen  03/31/2022    Flu vaccine (Season Ended) 09/01/2022    Depression Screen  04/06/2023    Potassium monitoring  04/06/2023    Creatinine monitoring  04/06/2023    Breast cancer screen  04/23/2023    Lipid screen  04/06/2027    Hepatitis C screen  Completed    HIV screen  Completed    Hepatitis A vaccine  Aged Out    Hepatitis B vaccine  Aged Out    Hib vaccine  Aged Out    Meningococcal (ACWY) vaccine  Aged Out    Pneumococcal 0-64 years Vaccine  Aged Out       Assessment & Plan   Encounter for screening mammogram for breast cancer with no symptoms of breast cancer will schedule annual mammogram.  -     TANYA ANGELICA DIGITAL SCREEN BILATERAL; Future  Encounter for hepatitis C screening test for low risk patient, low risk patient but will screen since there is a cure. -     Hepatitis C Antibody  Mixed hyperlipidemia discussed diet, exercise and weight loss and will monitor lipid and thyroid studies  -     Lipid Panel  -     TSH with Reflex to FT4  Essential hypertension: Is controlled on current regimen continue  BP Readings from Last 3 Encounters:   04/06/22 136/78   05/12/21 136/78   03/31/21 (!) 154/92       -     CBC with Auto Differential  -     Comprehensive Metabolic Panel  -     Urinalysis  Vitamin D deficiency: High risk for deficiency living in the [place] will monitor level and goal is above 30 in the COVID-19 pandemic.  -     Vitamin D 25 Hydroxy  Well woman exam, schedule bone density and refer for pelvic exam since last one was about 4 years ago. -     DEXA BONE DENSITY 2 SITES; Future  -     AFL - Allyne Babinski, MD, Obstetrics, [facility]  Left shoulder pain, unspecified chronicity and clavicular pain. Will x-ray. Suspect ligamentous injury. -     XR SHOULDER LEFT (MIN 2 VIEWS);  Future  Clavicle pain  -     XR SHOULDER LEFT (MIN 2 VIEWS); Future  Rash, red rash and also small raised macules. Appearance is suspicious for lichen planus. They appear and then itch and resolve and come and go in different spots. While awaiting dermatology consultation will prescribe steroid cream to apply  -     Garland Paulino MD, Dermatology, [facility]  -     mometasone (ELOCON) 0.1 % cream; Apply topically daily to rash prn, Disp-15 g, R-1, Normal    Return in about 1 year (around 4/6/2023).          --Brock Arnold MD

## 2022-04-07 DIAGNOSIS — D50.9 HYPOCHROMIC MICROCYTIC ANEMIA: ICD-10-CM

## 2022-04-07 DIAGNOSIS — E78.2 MIXED HYPERLIPIDEMIA: Primary | ICD-10-CM

## 2022-04-07 PROBLEM — E55.9 VITAMIN D DEFICIENCY: Status: ACTIVE | Noted: 2022-04-07

## 2022-04-07 PROBLEM — Z01.419 WELL WOMAN EXAM: Status: ACTIVE | Noted: 2022-04-07

## 2022-04-07 PROBLEM — R21 RASH: Status: ACTIVE | Noted: 2022-04-07

## 2022-04-07 PROBLEM — Z12.31 ENCOUNTER FOR SCREENING MAMMOGRAM FOR BREAST CANCER: Status: ACTIVE | Noted: 2022-04-07

## 2022-04-07 PROBLEM — I10 ESSENTIAL HYPERTENSION: Status: ACTIVE | Noted: 2022-04-07

## 2022-04-07 PROBLEM — M89.8X1 CLAVICLE PAIN: Status: ACTIVE | Noted: 2018-06-05

## 2022-04-07 PROBLEM — Z11.59 ENCOUNTER FOR HEPATITIS C SCREENING TEST FOR LOW RISK PATIENT: Status: ACTIVE | Noted: 2022-04-07

## 2022-04-07 ASSESSMENT — ENCOUNTER SYMPTOMS
BLURRED VISION: 0
ORTHOPNEA: 0
SHORTNESS OF BREATH: 0

## 2022-04-07 NOTE — RESULT ENCOUNTER NOTE
The urinalysis is normal. The thyroid test is normal.  The cholesterol is improved, but we need it a little lower. The 10 year risk for heart attack or stroke is calculated at 2.3%  Normal risk is less than 6%. Let's continue with diet and exercise. Normal kidney blood test. Negative hepatitis C screen. Normal Vitamin D. Continue supplement. You are anemic that is new. Make an appointment to see your gynecologist and I want you to have an upper and lower endoscopy. I gave you a referral to see the gynecologist below at your recent apt. Call for an appointment. Rosalva Winter MD   [address] [place] [place] [address]. [address], [facility] [address]   Phone: [phone]    I am referring you for a colonoscopy and upper endoscopy. Call the number below for an appointment. Tristin Malhotra MD  [address] [place] [address], [facility] [address], [facility] 24  Phone: [phone]  Fax: [phone]  My Chart message sent.

## 2022-04-13 ENCOUNTER — TELEPHONE (OUTPATIENT)
Dept: INTERNAL MEDICINE CLINIC | Age: 52
End: 2022-04-13

## 2022-04-13 DIAGNOSIS — R03.0 ELEVATED BP WITHOUT DIAGNOSIS OF HYPERTENSION: ICD-10-CM

## 2022-04-13 DIAGNOSIS — E55.9 VITAMIN D DEFICIENCY: Primary | ICD-10-CM

## 2022-04-13 DIAGNOSIS — M51.36 DDD (DEGENERATIVE DISC DISEASE), LUMBAR: ICD-10-CM

## 2022-04-13 RX ORDER — LISINOPRIL 10 MG/1
10 TABLET ORAL DAILY
Qty: 30 TABLET | Refills: 5 | Status: SHIPPED | OUTPATIENT
Start: 2022-04-13 | End: 2022-10-10

## 2022-04-13 RX ORDER — MELOXICAM 15 MG/1
15 TABLET ORAL DAILY PRN
Qty: 30 TABLET | Refills: 0 | Status: SHIPPED | OUTPATIENT
Start: 2022-04-13 | End: 2022-05-13

## 2022-04-13 NOTE — TELEPHONE ENCOUNTER
Pt is due for an RX for Vit Yariel Chang said Dr. Adelina Mallory was going to change her script to Vitamin D3.       Zarina Valladares

## 2022-05-07 PROBLEM — Z01.419 WELL WOMAN EXAM: Status: RESOLVED | Noted: 2022-04-07 | Resolved: 2022-05-07

## 2022-05-13 DIAGNOSIS — M51.36 DDD (DEGENERATIVE DISC DISEASE), LUMBAR: ICD-10-CM

## 2022-05-13 RX ORDER — MELOXICAM 15 MG/1
TABLET ORAL
Qty: 30 TABLET | Refills: 0 | Status: SHIPPED | OUTPATIENT
Start: 2022-05-13 | End: 2022-06-13

## 2022-05-13 NOTE — TELEPHONE ENCOUNTER
Last visit 4/6/22  Future Appointments   Date Time Provider Tobin Coffman   5/23/2022  8:45 AM [facility]   5/23/2022  9:40 AM [facility]

## 2022-05-23 ENCOUNTER — HOSPITAL ENCOUNTER (OUTPATIENT)
Dept: WOMENS IMAGING | Age: 52
Discharge: HOME | End: 2022-05-23
Payer: COMMERCIAL

## 2022-05-23 DIAGNOSIS — Z01.419 WELL WOMAN EXAM: ICD-10-CM

## 2022-05-23 DIAGNOSIS — Z12.31 ENCOUNTER FOR SCREENING MAMMOGRAM FOR BREAST CANCER: ICD-10-CM

## 2022-05-23 PROCEDURE — 77080 DXA BONE DENSITY AXIAL: CPT

## 2022-05-23 PROCEDURE — 77063 BREAST TOMOSYNTHESIS BI: CPT

## 2022-06-12 DIAGNOSIS — M51.36 DDD (DEGENERATIVE DISC DISEASE), LUMBAR: ICD-10-CM

## 2022-06-13 RX ORDER — MELOXICAM 15 MG/1
TABLET ORAL
Qty: 30 TABLET | Refills: 1 | Status: SHIPPED | OUTPATIENT
Start: 2022-06-13 | End: 2022-08-12

## 2022-08-11 DIAGNOSIS — M51.36 DDD (DEGENERATIVE DISC DISEASE), LUMBAR: ICD-10-CM

## 2022-08-12 RX ORDER — MELOXICAM 15 MG/1
TABLET ORAL
Qty: 30 TABLET | Refills: 1 | Status: SHIPPED | OUTPATIENT
Start: 2022-08-12 | End: 2022-10-10

## 2022-10-10 DIAGNOSIS — M51.36 DDD (DEGENERATIVE DISC DISEASE), LUMBAR: ICD-10-CM

## 2022-10-10 DIAGNOSIS — R03.0 ELEVATED BP WITHOUT DIAGNOSIS OF HYPERTENSION: ICD-10-CM

## 2022-10-10 RX ORDER — LISINOPRIL 10 MG/1
10 TABLET ORAL DAILY
Qty: 30 TABLET | Refills: 11 | Status: SHIPPED | OUTPATIENT
Start: 2022-10-10

## 2022-10-10 RX ORDER — MELOXICAM 15 MG/1
TABLET ORAL
Qty: 30 TABLET | Refills: 5 | Status: SHIPPED | OUTPATIENT
Start: 2022-10-10

## 2022-10-12 ENCOUNTER — OFFICE VISIT (OUTPATIENT)
Dept: ORTHOPEDIC SURGERY | Age: 52
End: 2022-10-12
Payer: COMMERCIAL

## 2022-10-12 DIAGNOSIS — M75.42 IMPINGEMENT SYNDROME OF LEFT SHOULDER: Primary | ICD-10-CM

## 2022-10-12 PROCEDURE — 99204 OFFICE O/P NEW MOD 45 MIN: CPT | Performed by: STUDENT IN AN ORGANIZED HEALTH CARE EDUCATION/TRAINING PROGRAM

## 2022-10-12 PROCEDURE — 73030 X-RAY EXAM OF SHOULDER: CPT | Performed by: STUDENT IN AN ORGANIZED HEALTH CARE EDUCATION/TRAINING PROGRAM

## 2022-10-12 SDOH — HEALTH STABILITY: PHYSICAL HEALTH: ON AVERAGE, HOW MANY MINUTES DO YOU ENGAGE IN EXERCISE AT THIS LEVEL?: 30 MIN

## 2022-10-12 SDOH — HEALTH STABILITY: PHYSICAL HEALTH: ON AVERAGE, HOW MANY DAYS PER WEEK DO YOU ENGAGE IN MODERATE TO STRENUOUS EXERCISE (LIKE A BRISK WALK)?: 4 DAYS

## 2022-10-12 ASSESSMENT — SOCIAL DETERMINANTS OF HEALTH (SDOH)

## 2022-10-12 NOTE — PROGRESS NOTES
Chief Complaint   Patient presents with    Shoulder Pain    Knee Pain         HPI:      Rae Urbina is a 45 y.o. female who presents for evaluation of left shoulder and left knee pain. She has had intermittent left shoulder pain for some time, but she started to go to the gym over the past month and was doing some light shoulder exercises. This started to cause some shoulder pain but she thought it was just normal muscle soreness so she continue to push through the pain up until about a week and a half ago when she decided that she should have this checked out. The pain is worse when laying on it in the evening and after movement. Is now a constant dull ache. She has taken meloxicam and over-the-counter pain medications as needed. She has been having some left knee pain was previously diagnosed with an IT band syndrome on the left. Her symptoms do feel the same she indicates pain along the IT band and ending at [facility] tubercle. The pain is worse with going up and down steps. Past Medical History:   Diagnosis Date    HIGH CHOLESTEROL     Hyperlipidemia     Hypertension     Vitamin D deficiency        Medications and allergies were reviewed as necessary. Review of Systems:  Pertinent items are noted in HPI. Physical Examination: This is a pleasant female, alert, and in no acute distress. There were no vitals filed for this visit. Left shoulder exam: No AC tenderness, no clavicular or SC tenderness. There is some mild tenderness over the greater tuberosity. Full range of motion in all planes with painful arc. Negative drop sign. Positive Taylor and Neer's. Positive speeds, negative Yergason's. Positive full can, mildly positive empty can.  5/5 strength with ABduction in the scapular plane, internal rotation, external rotation. Left knee exam: Indicates pain along the left IT band terminating at [facility] tubercle. Positive [place]. No joint line tenderness.     Vascular exam: Extremities warm and well perfused, no significant edema    Respiratory exam: Breathing easy and unlabored    Neuro exam: No focal neuro deficits    Lymphatic: No obvious lymphadenopathy    Skin: Warm, dry    Radiology:     3 view X-rays of the left shoulder dated 10/12/2022 were reviewed independently today and discussed with the patient. The films revealed: No acute osseous abnormalities. Mild AC and glenohumeral joint arthrosis. Assessment and Plan:     1. Impingement syndrome of left shoulder  Her symptoms seem most consistent with an impingement syndrome of the left shoulder most likely affecting her supraspinatus and long head of the biceps given her positive speeds test.  She is previously had problems in the shoulder in the past and did PT but is been several years. Her symptoms do not currently seem consistent with a full-thickness tear so would like to get her into physical therapy to rehab the rotator cuff and biceps tendon. We did discuss other treatment options today including injections and medications. She will continue to use meloxicam.  - XR SHOULDER LEFT (MIN 2 VIEWS); Future  - [facility]  [facility]  - Meloxicam as needed    2. Left knee pain  She is also having left knee pain which on my brief clinical exam today seems most consistent with IT band syndrome. We will have her do some exercises for this to work on rehabbing it. If her symptoms fail to improve we may need to get x-rays. Follow-up: 1 month. Sooner with any problems, questions, concerns, or worsening symptoms. Jayshree Logan MD  Primary Care Sports Medicine    Please note that this chart was at least partially generated using dragon dictation software. Although every effort was made to ensure the accuracy of this automated transcription, some errors in transcription may have occurred.

## 2022-10-17 ENCOUNTER — HOSPITAL ENCOUNTER (OUTPATIENT)
Dept: PHYSICAL THERAPY | Age: 52
Setting detail: THERAPIES SERIES
Discharge: HOME | End: 2022-10-17
Payer: COMMERCIAL

## 2022-10-17 PROCEDURE — 97110 THERAPEUTIC EXERCISES: CPT | Performed by: PHYSICAL THERAPIST

## 2022-10-17 PROCEDURE — 97161 PT EVAL LOW COMPLEX 20 MIN: CPT | Performed by: PHYSICAL THERAPIST

## 2022-10-17 PROCEDURE — 97112 NEUROMUSCULAR REEDUCATION: CPT | Performed by: PHYSICAL THERAPIST

## 2022-10-17 NOTE — FLOWSHEET NOTE
[facility], [address] [place], [address]  Phone: [phone]   Fax: [phone]    Physical Therapy Treatment Note/ Progress Report:       Date:  10/17/2022    Patient Name:  Shira Romeo    :  1970  MRN: [id number]  Restrictions/Precautions:    Medical/Treatment Diagnosis Information:  Diagnosis: M75.42 (ICD-10-CM) - Impingement syndrome of left shoulder  Treatment Diagnosis: decreased ROM, strength, posture, endurance, and high-level iADLs  Insurance/Certification information:  PT Insurance Information: [employer]  Physician Information:  Dr. Zaheer Laughlin  Has the plan of care been signed (Y/N):        []  Yes  [x]  No     Date of Patient follow up with Physician:       Is this a Progress Report:     []  Yes  [x]  No        Progress report/ Recertification will be due (10 Rx or 30 days whichever is less): 2022       Visit # Insurance Allowable Auth Required   1 30 []  Yes []  No        Functional Scale:  FOTO Shoulder  10/17 - 55/100         Latex Allergy:  [x]NO      []YES  Preferred Language for Healthcare:   [x]English       []other:      Pain level:  3/10 10/17     SUBJECTIVE:  See eval 10/17    OBJECTIVE: See eval 10/17  Observation:   Test measurements:      RESTRICTIONS/PRECAUTIONS:     Exercises/Interventions:   Exercise/Equipment Resistance/Repetitions Other comments   Stretching/PROM     Wand     Table Slides     ER cane - supine 10 sec x 10  Added 10/   Pulleys 10 sec x 10 ABD Added 10/17   Pendulum          Isometrics     Retraction 10 sec x 10 Added 10/17        Weight shift     Flexion     Abduction 10 sec x 10  Added 10/17   External Rotation 10 sec x 10  Added 10/   Internal Rotation     Biceps     Triceps          PRE's     Flexion     Abduction     External Rotation     Internal Rotation     Shrugs     EXT     Reverse Flys     Serratus     Horizontal Abd with ER     Biceps     Triceps Retraction          Cable Column/Theraband     External Rotation     Internal Rotation     Shrugs     Lats     Ext     Flex     Scapular Retraction     BIC     TRIC     PNF          Dynamic Stability          Plyoback          Manual interventions                     Therapeutic Exercise and NMR EXR  [x] (29473) Provided verbal/tactile cueing for activities related to strengthening, flexibility, endurance, ROM  for improvements in scapular, scapulothoracic and UE control with self care, reaching, carrying, lifting, house/yardwork, driving/computer work. [x] (35501) Provided verbal/tactile cueing for activities related to improving balance, coordination, kinesthetic sense, posture, motor skill, proprioception  to assist with  scapular, scapulothoracic and UE control with self care, reaching, carrying, lifting, house/yardwork, driving/computer work. Therapeutic Activities:    [x] (59890 or 41387) Provided verbal/tactile cueing for activities related to improving balance, coordination, kinesthetic sense, posture, motor skill, proprioception and motor activation to allow for proper function of scapular, scapulothoracic and UE control with self care, carrying, lifting, driving/computer work.      Home Exercise Program:    [x] (62511) Reviewed/Progressed HEP activities related to strengthening, flexibility, endurance, ROM of scapular, scapulothoracic and UE control with self care, reaching, carrying, lifting, house/yardwork, driving/computer work  [] (49090) Reviewed/Progressed HEP activities related to improving balance, coordination, kinesthetic sense, posture, motor skill, proprioception of scapular, scapulothoracic and UE control with self care, reaching, carrying, lifting, house/yardwork, driving/computer work      Manual Treatments:  PROM / STM / Oscillations-Mobs:  G-I, II, III, IV (PA's, Inf., Post.)  [] (89426) Provided manual therapy to mobilize soft tissue/joints of cervical/CT, scapular GHJ and UE for the purpose of modulating pain, promoting relaxation,  increasing ROM, reducing/eliminating soft tissue swelling/inflammation/restriction, improving soft tissue extensibility and allowing for proper ROM for normal function with self care, reaching, carrying, lifting, house/yardwork, driving/computer work    Modalities:     [] GAME READY (VASO)- for significant edema, swelling, pain control. Charges:  Timed Code Treatment Minutes: 30'    Total Treatment Minutes: 67'       [x] EVAL (LOW) 57575 (typically 20 minutes face-to-face)  [] EVAL (MOD) 12924 (typically 30 minutes face-to-face)  [] EVAL (HIGH) 45607 (typically 45 minutes face-to-face)  [] RE-EVAL     [x] ZM(35405) x  1   [] IONTO  [x] NMR (39047) x 1    [] VASO  [] Manual (63102) x     [] Other:  [] TA x      [] Mech Traction (04093)  [] ES(attended) (15351)      [] ES (un) (29039):         ASSESSMENT:  See eval 10/17      GOALS:   Patient stated goal: reach with little to no pain, light weight lifting     [] Progressing: [] Met: [] Not Met: [] Adjusted    Therapist goals for Patient:   Short Term Goals: To be achieved in: 2 weeks  1. Independent in HEP and progression per patient tolerance, in order to prevent re-injury. [] Progressing: [] Met: [] Not Met: [] Adjusted   2. Patient will have a decrease in pain to facilitate improvement in movement, function, and ADLs as indicated by Functional Deficits. [] Progressing: [] Met: [] Not Met: [] Adjusted    Long Term Goals: To be achieved in: 4-6 weeks  1. Patient will score greater than or equal to 73/100 on FOTO shoulder to assist with reaching prior level of function. [] Progressing: [] Met: [] Not Met: [] Adjusted  2. Patient will demonstrate increased L shoulder flex AROM to greater than or equal to 170 deg, ABD AROM to greater than or equal to 145 deg, and ER AROM to greater than or equal to 90 deg to allow for proper joint functioning as indicated by patients Functional Deficits.     [] Progressing: [] Met: [] Not Met: [] Adjusted  3. Patient will demonstrate an increase in L shoulder (flex, ABD, IR, and ER) strength to 4+/5  to allow for proper functional mobility as indicated by patients Functional Deficits. [] Progressing: [] Met: [] Not Met: [] Adjusted  4. Patient will return to ADLs without increased symptoms or restriction. [] Progressing: [] Met: [] Not Met: [] Adjusted  5. Patient will return to independent workout pain free. [] Progressing: [] Met: [] Not Met: [] Adjusted       Overall Progression Towards Functional goals/ Treatment Progress Update:  [] Patient is progressing as expected towards functional goals listed. [] Progression is slowed due to complexities/Impairments listed. [] Progression has been slowed due to co-morbidities. [x] Plan just implemented, too soon to assess goals progression <30days   [] Goals require adjustment due to lack of progress  [] Patient is not progressing as expected and requires additional follow up with physician  [] Other    Prognosis for POC: [x] Good [] Fair  [] Poor      Patient requires continued skilled intervention: [x] Yes  [] No      Treatment/Activity Tolerance:  [x] Patient tolerated treatment well [] Patient limited by fatique  [] Patient limited by pain  [] Patient limited by other medical complications  [] Other: Pt was educated on the importance of proper posture and discussed working on sitting with 1 min of proper posture during work every activity change (~ 45 - 60 minutes). 10/17    Patient education: Patient education on PT and plan of care including diagnosis, prognosis, treatment goals and options. Patient agrees with discussed POC and treatment and is aware of rehab process.  10/17      PLAN: See eval 10/17  [] Continue per plan of care [] Alter current plan (see comments above)  [x] Plan of care initiated [] Hold pending MD visit [] Discharge      Electronically signed by:  Marta Ya, PT, DPT    Note: If patient does not return for scheduled/ recommended follow up visits, this note will serve as a discharge from care along with most recent update on progress.

## 2022-10-17 NOTE — PLAN OF CARE
[facility], [address] [place], [address]  Phone: [phone]   Fax: [phone]      Physical Therapy Certification    Dear Dr. Tashi Verde,     We had the pleasure of evaluating the following patient for physical therapy services at [address] [place] [address]. A summary of our findings can be found in the initial assessment below. This includes our plan of care. If you have any questions or concerns regarding these findings, please do not hesitate to contact me at the office phone number checked above. Thank you for the referral.       Physician Signature:_______________________________Date:__________________  By signing above (or electronic signature), therapists plan is approved by physician      Patient: Yahaira Oropeza   : 1970   MRN: [id number]  Referring Physician: Dr. Tashi Verde      Evaluation Date: 10/17/2022      Medical Diagnosis Information:  Diagnosis: M75.42 (ICD-10-CM) - Impingement syndrome of left shoulder   Treatment Diagnosis: decreased ROM, strength, posture, endurance, and high-level iADLs                                         Insurance information: PT Insurance Information: Diana    C-SSRS Triggered by Intake questionnaire (Past 2 wk assessment):   [x] No, Questionnaire did not trigger screening.   [] Yes, Patient intake triggered further evaluation      [] C-SSRS Screening completed  [] PCP notified via Plan of Care  [] Emergency services notified     Precautions/ Contra-indications: none  Latex Allergy:  [x]NO      []YES  Preferred Language for Healthcare:   [x]English       []other:    SUBJECTIVE: Patient stated complaint: states she she started to do weight lifting and started about 8 weeks ago. She states she noticed it first in her workout class. She thought it was muscle pain, but it did not go away once she rested for 2 weeks.  She states the pain did not get worse. She went to see the MD, who took an x-ray and referred her to PT. She states the MD did say there was arthritis. CLOF: She states she does have a bit of mobility. She states reaching out to the side of her chair increases pain. She states reaching back to get a seat belt increases pain. She states she is able to start sleeping on the L side, but then she will have to roll to the R side. WB through the LUE increases pain. She states she does lift ids at work up on her lap, which increases pain. Relevant Medical History:high blood pressure (controlled)  Functional Disability Index:  FOTO knee  10/17 - 55/100      Pain Scale: 3/10 today, 7/10 at worst  Easing factors: ibuprofen  Provocative factors: listed above      Type: []Constant   [x]Intermittent  []Radiating []Localized []other:     Numbness/Tingling: on occasion - 2-3x/ over the course of 2 weeks    Occupation/School:     Hobbies: Lifting weights - [employer] workout,     Living Status/Prior Level of Function: Independent with ADLs, IADLs, and working out     OBJECTIVE:     ROM AROM  Comment    L R    Flexion 165  + pain 174    Abduction 125 + pain  148    ER 73  + pain  93    IR 57 60    Other(cervical)      Other           Strength L R Comment   Flexion 4/5 4/5    Abduction 4-/5 + pain  4/5    ER 4/5 + pain  4/5    IR 4/5 4/5                Lower Trap      Mid Trap 4-/5 4-/5    Rhomboids 4-/5 4/5    Biceps 4/5 4/5    Triceps 4/5 4/5      Special Tests Results/Comment   Javier    Neers    Speeds    OBriens    Other      Reflexes/Sensation:    [x]Dermatomes/Myotomes intact    [x]Reflexes equal and normal bilaterally   []Other:    Joint mobility:    [x]Normal    []Hypo   []Hyper    Palpation:    Functional Mobility/Transfers: decreased WB through LUE    Posture: rounded shoulders     Gait: (include devices/WB status): WNL    [x] Patient history, allergies, meds reviewed. Medical chart reviewed. See intake form.      Review Of Systems (ROS):  [x]Performed Review of systems (Integumentary, CardioPulmonary, Neurological) by intake and observation. Intake form has been scanned into medical record. Patient has been instructed to contact their primary care physician regarding ROS issues if not already being addressed at this time. Co-morbidities/Complexities (which will affect course of rehabilitation):   []None           Arthritic conditions   []Rheumatoid arthritis (M05.9)  []Osteoarthritis (M19.91)   Cardiovascular conditions   [x]Hypertension (I10)  []Hyperlipidemia (E78.5)  []Angina pectoris (I20)  []Atherosclerosis (I70)   Musculoskeletal conditions   []Disc pathology   []Congenital spine pathologies   []Prior surgical intervention  []Osteoporosis (M81.8)  []Osteopenia (M85.8)   Endocrine conditions   []Hypothyroid (E03.9)  []Hyperthyroid Gastrointestinal conditions   []Constipation (C58.57)   Metabolic conditions   []Morbid obesity (E66.01)  []Diabetes type 1(E10.65) or 2 (E11.65)   []Neuropathy (G60.9)     Pulmonary conditions   []Asthma (J45)  []Coughing   []COPD (J44.9)   Psychological Disorders  []Anxiety (F41.9)  []Depression (F32.9)   []Other:   [x]Other:   hernia 2004, gallbladder 1998, L knee pain        Barriers to/and or personal factors that will affect rehab potential:              []Age  []Sex              []Motivation/Lack of Motivation                        []Co-Morbidities              []Cognitive Function, education/learning barriers              []Environmental, home barriers              [x]profession/work barriers  []past PT/medical experience  []other:  Justification: Pt's job requires use or UE, which may cause pain and affect rehab potential.     Falls Risk Assessment (30 days):   [x] Falls Risk assessed and no intervention required.   [] Falls Risk assessed and Patient requires intervention due to being higher risk   TUG score (>12s at risk):     [] Falls education provided, including         ASSESSMENT: Functional Impairments   []Noted spinal or UE joint hypomobility   []Noted spinal or UE joint hypermobility   [x]Decreased UE functional ROM   [x]Decreased UE functional strength   []Abnormal reflexes/sensation/myotomal/dermatomal deficits   [x]Decreased RC/scapular/core strength and neuromuscular control   []other:      Functional Activity Limitations (from functional questionnaire and intake)   []Reduced ability to tolerate prolonged functional positions   []Reduced ability or difficulty with changes of positions or transfers between positions   []Reduced ability to maintain good posture and demonstrate good body mechanics with sitting, bending, and lifting   [] Reduced ability or tolerance with driving and/or computer work   [x]Reduced ability to sleep   [x]Reduced ability to perform lifting, reaching, carrying tasks   []Reduced ability to tolerate impact through UE   []Reduced ability to reach behind back   []Reduced ability to  or hold objects   []Reduced ability to throw or toss an object   []other:      Participation Restrictions   [x]Reduced participation in self care activities   [x]Reduced participation in home management activities   [x]Reduced participation in work activities   []Reduced participation in social activities. []Reduced participation in sport / recreational activities. Classification:   []Signs/symptoms consistent with post-surgical status including decreased ROM, strength and function.   []Signs/symptoms consistent with joint sprain/strain   [x]Signs/symptoms consistent with shoulder impingement   []Signs/symptoms consistent with shoulder/elbow/wrist tendinopathy   []Signs/symptoms consistent with Rotator cuff tear   []Signs/symptoms consistent with labral tear   []Signs/symptoms consistent with postural dysfunction    []Signs/symptoms consistent with Glenohumeral IR Deficit - <45 degrees   []Signs/symptoms consistent with facet dysfunction of cervical/thoracic spine    []Signs/symptoms consistent with pathology which may benefit from Dry needling     []other:     Prognosis/Rehab Potential:      [x]Excellent   [x]Good    []Fair   []Poor    Tolerance of evaluation/treatment:    [x]Excellent   [x]Good    []Fair   []Poor    Physical Therapy Evaluation Complexity Justification  [x] A history of present problem with:  [] no personal factors and/or comorbidities that impact the plan of care;  [x]1-2 personal factors and/or comorbidities that impact the plan of care  []3 personal factors and/or comorbidities that impact the plan of care  [x] An examination of body systems using standardized tests and measures addressing any of the following: body structures and functions (impairments), activity limitations, and/or participation restrictions;:  [] a total of 1-2 or more elements   [] a total of 3 or more elements   [x] a total of 4 or more elements   [x] A clinical presentation with:  [x] stable and/or uncomplicated characteristics   [] evolving clinical presentation with changing characteristics  [] unstable and unpredictable characteristics;   [x] Clinical decision making of [x] low, [] moderate, [] high complexity using standardized patient assessment instrument and/or measurable assessment of functional outcome. [x] EVAL (LOW) 94045 (typically 20 minutes face-to-face)  [] EVAL (MOD) 92587 (typically 30 minutes face-to-face)  [] EVAL (HIGH) 66155 (typically 45 minutes face-to-face)  [] RE-EVAL     Reviewed insurance benefits for physical therapy in an outpatient hospital based setting with the patient, including deductible and allowable visit number.      PLAN:  Frequency/Duration:  1-2 days per week for 4-6 Weeks:  INTERVENTIONS:  [x] Therapeutic exercise including: strength training, ROM, for Upper extremity and core   [x]  NMR activation and proprioception for UE, scap and Core   [x] Manual therapy as indicated for shoulder, scapula and spine to include: Dry Needling/IASTM, STM, PROM, Gr I-IV mobilizations, manipulation. [x] Modalities as needed that may include: thermal agents, E-stim, Biofeedback, US, iontophoresis as indicated  [x] Patient education on joint protection, postural re-education, activity modification, progression of HEP. HEP instruction: (see scanned forms) Access Code [email or web address]    GOALS:  Patient stated goal: reach with little to no pain, light weight lifting     [] Progressing: [] Met: [] Not Met: [] Adjusted    Therapist goals for Patient:   Short Term Goals: To be achieved in: 2 weeks  1. Independent in HEP and progression per patient tolerance, in order to prevent re-injury. [] Progressing: [] Met: [] Not Met: [] Adjusted   2. Patient will have a decrease in pain to facilitate improvement in movement, function, and ADLs as indicated by Functional Deficits. [] Progressing: [] Met: [] Not Met: [] Adjusted    Long Term Goals: To be achieved in: 4-6 weeks  1. Patient will score greater than or equal to 73/100 on FOTO shoulder to assist with reaching prior level of function. [] Progressing: [] Met: [] Not Met: [] Adjusted  2. Patient will demonstrate increased L shoulder flex AROM to greater than or equal to 170 deg, ABD AROM to greater than or equal to 145 deg, and ER AROM to greater than or equal to 90 deg to allow for proper joint functioning as indicated by patients Functional Deficits. [] Progressing: [] Met: [] Not Met: [] Adjusted  3. Patient will demonstrate an increase in L shoulder (flex, ABD, IR, and ER) strength to 4+/5  to allow for proper functional mobility as indicated by patients Functional Deficits. [] Progressing: [] Met: [] Not Met: [] Adjusted  4. Patient will return to ADLs without increased symptoms or restriction. [] Progressing: [] Met: [] Not Met: [] Adjusted  5. Patient will return to independent workout pain free.    [] Progressing: [] Met: [] Not Met: [] Adjusted     Electronically signed by:  Marysol Melvin VIRAL HackettT

## 2022-10-19 ENCOUNTER — HOSPITAL ENCOUNTER (OUTPATIENT)
Dept: PHYSICAL THERAPY | Age: 52
Setting detail: THERAPIES SERIES
Discharge: HOME | End: 2022-10-19
Payer: COMMERCIAL

## 2022-10-19 PROCEDURE — 97110 THERAPEUTIC EXERCISES: CPT | Performed by: PHYSICAL THERAPIST

## 2022-10-19 PROCEDURE — 97112 NEUROMUSCULAR REEDUCATION: CPT | Performed by: PHYSICAL THERAPIST

## 2022-10-19 NOTE — FLOWSHEET NOTE
[address] [place] [address]  and [facility], [address] [facility], [address]  Phone: [phone]   Fax: [phone]    Physical Therapy Treatment Note/ Progress Report:       Date:  10/19/2022    Patient Name:  Keith Mckeon    :  1970  MRN: [id number]  Restrictions/Precautions:    Medical/Treatment Diagnosis Information:  Diagnosis: M75.42 (ICD-10-CM) - Impingement syndrome of left shoulder  Treatment Diagnosis: decreased ROM, strength, posture, endurance, and high-level iADLs  Insurance/Certification information:  PT Insurance Information: [employer]  Physician Information:  Dr. Jeff Hoppre  Has the plan of care been signed (Y/N):        []  Yes  [x]  No     Date of Patient follow up with Physician:       Is this a Progress Report:     []  Yes  [x]  No        Progress report/ Recertification will be due (10 Rx or 30 days whichever is less): 2022       Visit # Insurance Allowable Auth Required   2 30 []  Yes []  No        Functional Scale:  FOTO Shoulder  10/17 - 55/100         Latex Allergy:  [x]NO      []YES  Preferred Language for Healthcare:   [x]English       []other:      Pain level:  4 /10 10/19     SUBJECTIVE:  States her shoulder is bothering her a little more today. HEP is not painful, but she feels some pain after when using it. She states she gets her pulleys today.   10/19    OBJECTIVE: See crescencio 10/17  Observation:   Test measurements:      RESTRICTIONS/PRECAUTIONS:     Exercises/Interventions:   Exercise/Equipment Resistance/Repetitions Other comments   Stretching/PROM     Wand     Table Slides     ER cane - supine 10 sec x 10  Added 10/   Pulleys 10 sec x 10 ABD Added 10/17   Thoracic ext over foam roller 10 sec x 10  Added 10/19             Isometrics     Retraction 10 sec x 10 Added 10/17        Weight shift     Flexion     Abduction 10 sec x 10  Added 10/   External Rotation 10 sec x 10  Added 10/   Internal Rotation 10 sec x 10  Added 10/19   Biceps     Triceps          PRE's     Flexion     Abduction     External Rotation     Internal Rotation     Shrugs     EXT     Reverse Flys     Serratus     Horizontal Abd with ER     Biceps     Triceps     Retraction          Cable Column/Theraband     External Rotation     Internal Rotation     Shrugs     Lats     Ext 10 x 3 green TB  Added 10/19   Flex     Scapular Retraction     BIC     TRIC     PNF          Dynamic Stability     Supine ABCs 1x  Added 10/19        Plyoback          Manual interventions                     Therapeutic Exercise and NMR EXR  [x] (10787) Provided verbal/tactile cueing for activities related to strengthening, flexibility, endurance, ROM  for improvements in scapular, scapulothoracic and UE control with self care, reaching, carrying, lifting, house/yardwork, driving/computer work. [x] (09279) Provided verbal/tactile cueing for activities related to improving balance, coordination, kinesthetic sense, posture, motor skill, proprioception  to assist with  scapular, scapulothoracic and UE control with self care, reaching, carrying, lifting, house/yardwork, driving/computer work. Therapeutic Activities:    [x] (18248 or 81423) Provided verbal/tactile cueing for activities related to improving balance, coordination, kinesthetic sense, posture, motor skill, proprioception and motor activation to allow for proper function of scapular, scapulothoracic and UE control with self care, carrying, lifting, driving/computer work.      Home Exercise Program:    [x] (77950) Reviewed/Progressed HEP activities related to strengthening, flexibility, endurance, ROM of scapular, scapulothoracic and UE control with self care, reaching, carrying, lifting, house/yardwork, driving/computer work  [] (45034) Reviewed/Progressed HEP activities related to improving balance, coordination, kinesthetic sense, posture, motor skill, proprioception of scapular, scapulothoracic and UE control with self care, reaching, carrying, lifting, house/yardwork, driving/computer work      Access Code [email or web address]    Manual Treatments:  PROM / STM / Oscillations-Mobs:  G-I, II, III, IV (PA's, Inf., Post.)  [] (26843) Provided manual therapy to mobilize soft tissue/joints of cervical/CT, scapular GHJ and UE for the purpose of modulating pain, promoting relaxation,  increasing ROM, reducing/eliminating soft tissue swelling/inflammation/restriction, improving soft tissue extensibility and allowing for proper ROM for normal function with self care, reaching, carrying, lifting, house/yardwork, driving/computer work    Modalities:     [] GAME READY (VASO)- for significant edema, swelling, pain control. Charges:  Timed Code Treatment Minutes: 45'    Total Treatment Minutes: 48'        [] EVAL (LOW) 82690 (typically 20 minutes face-to-face)  [] EVAL (MOD) 01601 (typically 30 minutes face-to-face)  [] EVAL (HIGH) 66813 (typically 45 minutes face-to-face)  [] RE-EVAL     [x] CC(44651) x  2   [] IONTO  [x] NMR (79422) x 1    [] VASO  [] Manual (78134) x     [] Other:  [] TA x      [] Mech Traction (68149)  [] ES(attended) (70593)      [] ES (un) (70484):         ASSESSMENT:        GOALS:   Patient stated goal: reach with little to no pain, light weight lifting     [] Progressing: [] Met: [] Not Met: [] Adjusted    Therapist goals for Patient:   Short Term Goals: To be achieved in: 2 weeks  1. Independent in HEP and progression per patient tolerance, in order to prevent re-injury. [] Progressing: [] Met: [] Not Met: [] Adjusted   2. Patient will have a decrease in pain to facilitate improvement in movement, function, and ADLs as indicated by Functional Deficits. [] Progressing: [] Met: [] Not Met: [] Adjusted    Long Term Goals: To be achieved in: 4-6 weeks  1. Patient will score greater than or equal to 73/100 on FOTO shoulder to assist with reaching prior level of function.    [] Progressing: [] Met: [] Not Met: [] Adjusted  2. Patient will demonstrate increased L shoulder flex AROM to greater than or equal to 170 deg, ABD AROM to greater than or equal to 145 deg, and ER AROM to greater than or equal to 90 deg to allow for proper joint functioning as indicated by patients Functional Deficits. [] Progressing: [] Met: [] Not Met: [] Adjusted  3. Patient will demonstrate an increase in L shoulder (flex, ABD, IR, and ER) strength to 4+/5  to allow for proper functional mobility as indicated by patients Functional Deficits. [] Progressing: [] Met: [] Not Met: [] Adjusted  4. Patient will return to ADLs without increased symptoms or restriction. [] Progressing: [] Met: [] Not Met: [] Adjusted  5. Patient will return to independent workout pain free. [] Progressing: [] Met: [] Not Met: [] Adjusted       Overall Progression Towards Functional goals/ Treatment Progress Update:  [] Patient is progressing as expected towards functional goals listed. [] Progression is slowed due to complexities/Impairments listed. [] Progression has been slowed due to co-morbidities. [x] Plan just implemented, too soon to assess goals progression <30days   [] Goals require adjustment due to lack of progress  [] Patient is not progressing as expected and requires additional follow up with physician  [] Other    Prognosis for POC: [x] Good [] Fair  [] Poor      Patient requires continued skilled intervention: [x] Yes  [] No      Treatment/Activity Tolerance:  [x] Patient tolerated treatment well [] Patient limited by fatique  [] Patient limited by pain  [] Patient limited by other medical complications  [] Other: Pt genie session well. She did report discomfort with thoracic ext, so were held. She genie the addition of shoulder ext and supine ABCs pain free. Due to increased soreness, hold on working on posture all day. Educated to continue HEP and will add in posture education when pt is a little stronger.    10/19    Patient education: Patient education on PT and plan of care including diagnosis, prognosis, treatment goals and options. Patient agrees with discussed POC and treatment and is aware of rehab process. 10/17      PLAN: See eval 10/17  [] Continue per plan of care [] Alter current plan (see comments above)  [x] Plan of care initiated [] Hold pending MD visit [] Discharge      Electronically signed by:  Mihir Patricio, PT, DPT    Note: If patient does not return for scheduled/ recommended follow up visits, this note will serve as a discharge from care along with most recent update on progress.

## 2022-10-24 ENCOUNTER — HOSPITAL ENCOUNTER (OUTPATIENT)
Dept: PHYSICAL THERAPY | Age: 52
Setting detail: THERAPIES SERIES
Discharge: HOME | End: 2022-10-24
Payer: COMMERCIAL

## 2022-10-24 PROCEDURE — 97112 NEUROMUSCULAR REEDUCATION: CPT | Performed by: PHYSICAL THERAPIST

## 2022-10-24 PROCEDURE — 97110 THERAPEUTIC EXERCISES: CPT | Performed by: PHYSICAL THERAPIST

## 2022-10-24 NOTE — FLOWSHEET NOTE
[address] [place] [address]  and [facility], [address] [facility], [address]  Phone: [phone]   Fax: [phone]    Physical Therapy Treatment Note/ Progress Report:       Date:  10/24/2022    Patient Name:  Cynthia Burgess    :  1970  MRN: [id number]  Restrictions/Precautions:    Medical/Treatment Diagnosis Information:  Diagnosis: M75.42 (ICD-10-CM) - Impingement syndrome of left shoulder  Treatment Diagnosis: decreased ROM, strength, posture, endurance, and high-level iADLs  Insurance/Certification information:  PT Insurance Information: [employer]  Physician Information:  Dr. Kimberley Madrigal  Has the plan of care been signed (Y/N):        []  Yes  [x]  No     Date of Patient follow up with Physician:       Is this a Progress Report:     []  Yes  [x]  No        Progress report/ Recertification will be due (10 Rx or 30 days whichever is less): 2022       Visit # Insurance Allowable Auth Required   3 30 []  Yes []  No        Functional Scale:  FOTO Shoulder  10/17 - 55/100         Latex Allergy:  [x]NO      []YES  Preferred Language for Healthcare:   [x]English       []other:      Pain level:  2 /10 10/24     SUBJECTIVE:  States she is feeling improvement. She states carrying groceries in this weekend, increased pain.   10/24    OBJECTIVE: See crescencio 10/17  Observation:   Test measurements:      RESTRICTIONS/PRECAUTIONS:     Exercises/Interventions:   Exercise/Equipment Resistance/Repetitions Other comments   Stretching/PROM     Wand     Table Slides     ER cane - supine 10 sec x 10  Added 10/17   Pulleys 10 sec x 10 ABD Added 10/17   Thoracic ext over foam roller 10 sec x 10  Added 10/19             Isometrics     Retraction 10 sec x 10 Added 10/17        Weight shift     Flexion     Abduction Added 10/17   External Rotation Added 10/17   Internal Rotation Added 10/19   Biceps     Triceps          PRE's     Flexion     Abduction     External Rotation 10 x 3 red TB Added 10/24   Internal Rotation 10 x 3 blue TB  Added 10/24   Shrugs     EXT     Reverse Flys     Serratus 10 x 3  Added 10/24   Horizontal Abd with ER     Biceps     Triceps     Retraction          Cable Column/Theraband     External Rotation     Internal Rotation     Shrugs     Lats     Ext 10 x 3 green TB  Added 10/19   Flex     Scapular Retraction     BIC     TRIC     PNF          Dynamic Stability     Supine ABCs 1x 1# ^10/24        Plyoback          Manual interventions                     Therapeutic Exercise and NMR EXR  [x] (22028) Provided verbal/tactile cueing for activities related to strengthening, flexibility, endurance, ROM  for improvements in scapular, scapulothoracic and UE control with self care, reaching, carrying, lifting, house/yardwork, driving/computer work. [x] (53081) Provided verbal/tactile cueing for activities related to improving balance, coordination, kinesthetic sense, posture, motor skill, proprioception  to assist with  scapular, scapulothoracic and UE control with self care, reaching, carrying, lifting, house/yardwork, driving/computer work. Therapeutic Activities:    [x] (31372 or 31767) Provided verbal/tactile cueing for activities related to improving balance, coordination, kinesthetic sense, posture, motor skill, proprioception and motor activation to allow for proper function of scapular, scapulothoracic and UE control with self care, carrying, lifting, driving/computer work.      Home Exercise Program:    [x] (51588) Reviewed/Progressed HEP activities related to strengthening, flexibility, endurance, ROM of scapular, scapulothoracic and UE control with self care, reaching, carrying, lifting, house/yardwork, driving/computer work  [] (30369) Reviewed/Progressed HEP activities related to improving balance, coordination, kinesthetic sense, posture, motor skill, proprioception of scapular, scapulothoracic and UE control with self care, reaching, carrying, lifting, house/yardwork, driving/computer work      Access Code [email or web address]    Manual Treatments:  PROM / STM / Oscillations-Mobs:  G-I, II, III, IV (PA's, Inf., Post.)  [] (41462) Provided manual therapy to mobilize soft tissue/joints of cervical/CT, scapular GHJ and UE for the purpose of modulating pain, promoting relaxation,  increasing ROM, reducing/eliminating soft tissue swelling/inflammation/restriction, improving soft tissue extensibility and allowing for proper ROM for normal function with self care, reaching, carrying, lifting, house/yardwork, driving/computer work    Modalities:     [] GAME READY (VASO)- for significant edema, swelling, pain control. Charges:  Timed Code Treatment Minutes: 25'    Total Treatment Minutes: 29'       [] EVAL (LOW) 26169 (typically 20 minutes face-to-face)  [] EVAL (MOD) 46764 (typically 30 minutes face-to-face)  [] EVAL (HIGH) 71986 (typically 45 minutes face-to-face)  [] RE-EVAL     [x] FU(06149) x  1  [] IONTO  [x] NMR (59411) x 1    [] VASO  [] Manual (94083) x     [] Other:  [] TA x      [] Mech Traction (24493)  [] ES(attended) (03245)      [] ES (un) (14687):         ASSESSMENT:        GOALS:   Patient stated goal: reach with little to no pain, light weight lifting     [] Progressing: [] Met: [] Not Met: [] Adjusted    Therapist goals for Patient:   Short Term Goals: To be achieved in: 2 weeks  1. Independent in HEP and progression per patient tolerance, in order to prevent re-injury. [] Progressing: [] Met: [] Not Met: [] Adjusted   2. Patient will have a decrease in pain to facilitate improvement in movement, function, and ADLs as indicated by Functional Deficits. [] Progressing: [] Met: [] Not Met: [] Adjusted    Long Term Goals: To be achieved in: 4-6 weeks  1. Patient will score greater than or equal to 73/100 on FOTO shoulder to assist with reaching prior level of function. [] Progressing: [] Met: [] Not Met: [] Adjusted  2.  Patient will demonstrate increased L shoulder flex AROM to greater than or equal to 170 deg, ABD AROM to greater than or equal to 145 deg, and ER AROM to greater than or equal to 90 deg to allow for proper joint functioning as indicated by patients Functional Deficits. [] Progressing: [] Met: [] Not Met: [] Adjusted  3. Patient will demonstrate an increase in L shoulder (flex, ABD, IR, and ER) strength to 4+/5  to allow for proper functional mobility as indicated by patients Functional Deficits. [] Progressing: [] Met: [] Not Met: [] Adjusted  4. Patient will return to ADLs without increased symptoms or restriction. [] Progressing: [] Met: [] Not Met: [] Adjusted  5. Patient will return to independent workout pain free. [] Progressing: [] Met: [] Not Met: [] Adjusted       Overall Progression Towards Functional goals/ Treatment Progress Update:  [] Patient is progressing as expected towards functional goals listed. [] Progression is slowed due to complexities/Impairments listed. [] Progression has been slowed due to co-morbidities. [x] Plan just implemented, too soon to assess goals progression <30days   [] Goals require adjustment due to lack of progress  [] Patient is not progressing as expected and requires additional follow up with physician  [] Other    Prognosis for POC: [x] Good [] Fair  [] Poor      Patient requires continued skilled intervention: [x] Yes  [] No      Treatment/Activity Tolerance:  [x] Patient tolerated treatment well [] Patient limited by fatique  [] Patient limited by pain  [] Patient limited by other medical complications  [] Other: Pt genie session well. She genie increased resistance today, reporting increased muscular fatigue. 10/24    Patient education: Patient education on PT and plan of care including diagnosis, prognosis, treatment goals and options. Patient agrees with discussed POC and treatment and is aware of rehab process.  10/17      PLAN: See eval 10/17  [] Continue per plan of care [] Alter current plan (see comments above)  [x] Plan of care initiated [] Hold pending MD visit [] Discharge      Electronically signed by:  Myriam Lin PT, DPT    Note: If patient does not return for scheduled/ recommended follow up visits, this note will serve as a discharge from care along with most recent update on progress.

## 2022-10-26 ENCOUNTER — HOSPITAL ENCOUNTER (OUTPATIENT)
Dept: PHYSICAL THERAPY | Age: 52
Setting detail: THERAPIES SERIES
Discharge: HOME | End: 2022-10-26
Payer: COMMERCIAL

## 2022-10-26 PROCEDURE — 97112 NEUROMUSCULAR REEDUCATION: CPT | Performed by: PHYSICAL THERAPIST

## 2022-10-26 PROCEDURE — 97110 THERAPEUTIC EXERCISES: CPT | Performed by: PHYSICAL THERAPIST

## 2022-10-26 PROCEDURE — 97140 MANUAL THERAPY 1/> REGIONS: CPT | Performed by: PHYSICAL THERAPIST

## 2022-10-26 NOTE — FLOWSHEET NOTE
[address] [place] [address] [facility], [address] [place], [address]  Phone: [phone]   Fax: [phone]    Physical Therapy Treatment Note/ Progress Report:       Date:  10/26/2022    Patient Name:  Connor Do    :  1970  MRN: [id number]  Restrictions/Precautions:    Medical/Treatment Diagnosis Information:  Diagnosis: M75.42 (ICD-10-CM) - Impingement syndrome of left shoulder  Treatment Diagnosis: decreased ROM, strength, posture, endurance, and high-level iADLs  Insurance/Certification information:  PT Insurance Information: [place]  Physician Information:  Dr. Miguel A Monet  Has the plan of care been signed (Y/N):        []  Yes  [x]  No     Date of Patient follow up with Physician:       Is this a Progress Report:     []  Yes  [x]  No        Progress report/ Recertification will be due (10 Rx or 30 days whichever is less): 2022       Visit # Insurance Allowable Auth Required   4 30 []  Yes []  No        Functional Scale:  FOTO Shoulder  10/17 - 55/100         Latex Allergy:  [x]NO      []YES  Preferred Language for Healthcare:   [x]English       []other:      Pain level:  1 -2 /10 10/26     SUBJECTIVE:  States states she had a lot of pain last night. 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 She does not recall any pain, but was concentrating on the child. She states her pain was 6/10 last night. She felt good after last session.    10/26    OBJECTIVE: See eval 10/17  Observation:   Test measurements:      RESTRICTIONS/PRECAUTIONS:     Exercises/Interventions:   Exercise/Equipment Resistance/Repetitions Other comments   Stretching/PROM     Wand     Table Slides     ER cane - supine 10 sec x 10  Added 10/17   Pulleys 10 sec x 10 ABD Added 10/17   Thoracic ext over foam roller 10 sec x 10  Added 10/19             Isometrics     Retraction 10 sec x 10 Added 10/17        Weight shift     Flexion     Abduction Added 10/17   External [facility]  History and Physical    Date of Service:  3/1/2022  Primary Care Provider: Kanwal Cordero MD  Source of information: The patient and Chart review    Chief Complaint: No chief complaint on file. History of Presenting Illness: Kerrie Carrion is a 66 y.o. male who presents with heart failure    , Heron Oconnell Sr is a 66 y. o. male with h/o HTN, HL, DM2, SRUTHI on CPAP, chronic systolic heart failure, stage D, NYHA class IV symptoms, non-ischemic cardiomyopathy, LVEF 20% with LVEDD 6.2, RV dysfunciton, TAPSE 1.22, TBili 1.5 likely from cardiac congestion, recent cardiac arrest s/p ICD (1/2013, [address] followed by [facility]), coronary artery disease s/p multiple interventions s/p 4V CABG (8/2012), LHC (7/2019) severe stenosis of LIMA to LAD anastomosis site, SVG graft to OM is occluded, SVG graft to RCA 40-50%, LAD occluded proximally, severe proximal LCx, RCA is the long . PET (6/2019) EF 24% with anterior lateral and inferior reversible defect. Anemia, microcytic with iron deficiency, DVT with filter. [facility] Dr. Eliazar Ann for evaluation of his candidacy for advanced therapies.     Patient agreed to inpatient initiation of palliative infusion of chronic inotropes and evaluation for LVAD-DT. Patient was admitted 5/2-5/25/21. Patient was started on milrinone infusion and had first part of LVAD evaluation completed. Right and left heart cath on 5/24/21 that showed severe diffuse native vessel CAD, with patent grafts (LIMA to D2, SVG to RCA).  Antiplatelet therapy was held during hospitalization and after discharge due to anticipated pulmonary nodule biopsy, bone marrow biopsy and EGD/C-Scope as part of LVAD workup.  Patient was readmitted 5/26-5/28/21 with hypertension, headache and chest pain, his troponin peaked at 10; he was shortly bridged with heparin, otherwise had normal EKG and chest CT negative for PE. Cardiology Rotation Added 10/17   Internal Rotation Added 10/19   Biceps     Triceps          PRE's     Flexion     Abduction     External Rotation 10 x 3 red TB Added 10/24   Internal Rotation 10 x 3 blue TB  Added 10/24   Shrugs     EXT     Reverse Flys     Serratus 10 x 3 2# ^10/26   Horizontal Abd with ER     Biceps     Triceps     Retraction          Cable Column/Theraband     External Rotation     Internal Rotation     Shrugs     Lats     Ext 10 x 3 green TB  Added 10/19   Flex     Scapular Retraction 10 x 3 blue TB  Added 10/26   BIC     TRIC     PNF          Dynamic Stability     Supine ABCs 1x 5# ^10/26        [place]          Manual interventions                     Therapeutic Exercise and NMR EXR  [x] (61435) Provided verbal/tactile cueing for activities related to strengthening, flexibility, endurance, ROM  for improvements in scapular, scapulothoracic and UE control with self care, reaching, carrying, lifting, house/yardwork, driving/computer work. [x] (79125) Provided verbal/tactile cueing for activities related to improving balance, coordination, kinesthetic sense, posture, motor skill, proprioception  to assist with  scapular, scapulothoracic and UE control with self care, reaching, carrying, lifting, house/yardwork, driving/computer work. Therapeutic Activities:    [x] (42991 or 46918) Provided verbal/tactile cueing for activities related to improving balance, coordination, kinesthetic sense, posture, motor skill, proprioception and motor activation to allow for proper function of scapular, scapulothoracic and UE control with self care, carrying, lifting, driving/computer work.      Home Exercise Program:    [x] (24943) Reviewed/Progressed HEP activities related to strengthening, flexibility, endurance, ROM of scapular, scapulothoracic and UE control with self care, reaching, carrying, lifting, house/yardwork, driving/computer work  [] (33646) Reviewed/Progressed HEP activities related to improving and AHF service was consulted and patient was discharged home after troponin came down.      Patient has now completed radiation treatment for spot in the lungs. Hem-onc has cleared patient. Patient will follow up with his pulmonary MD in 1-2 months. He is agreeable to proceed/complete with LVAD evaluation. He was direct-admitted today 3/1/2022 to complete GI scopes inpatient. Luna Barreto REVIEW OF SYSTEMS:  Pertinent items are noted in the History of Present Illness. Past Medical History:   Diagnosis Date    CAD (coronary artery disease) '90 '97, '13 x 2    [place] [facility]   [place] Calculus of kidney     Colonic polyps     Congestive heart failure, unspecified     Diabetes ([facility] [address]     Gastritis     Hypercholesterolemia     Sleep apnea       Past Surgical History:   Procedure Laterality Date    COLONOSCOPY  04/06/2011    16, due 21    ENDOSCOPY, COLON, DIAGNOSTIC      11, due 16    HX CORONARY ARTERY BYPASS GRAFT  8/22/12    x 4 vessel by MISSY LION    HX PACEMAKER PLACEMENT  1/30/13    [place] CARDIAC SURG PROCEDURE UNLIST  2012    x 4 vessels     Prior to Admission medications    Medication Sig Start Date End Date Taking? Authorizing Provider   benzonatate (TESSALON) 200 mg capsule Take 200 mg by mouth three (3) times daily as needed for Cough. Provider, Historical   bumetanide (BUMEX) 2 mg tablet Take 1 Tablet by mouth two (2) times a day. Patient taking differently: Take 2 mg by mouth two (2) times a day. 2 mg in am 1mg in evening 2/11/22   Darin Wong, NP   milrinone (PRIMACOR) 20 mg/100 mL (200 mcg/mL) infusion 31.688 mcg/min by IntraVENous route continuous. Patient taking differently: 0.375 mcg/kg/min by IntraVENous route continuous. Dosed at 84.5 kg 2/4/22   Ren Knowles, EARNESTINE   potassium chloride SR (K-TAB) 20 mEq tablet Take 2 Tablets by mouth two (2) times a day. Patient taking differently: Take 40 mEq by mouth two (2) times a day.  Two tablets in am one tablet pm 2/4/22   Lidia Delacruz NP   prasugreL (Effient) 10 mg tablet Take 10 mg by mouth daily. Provider, Historical   metFORMIN (GLUCOPHAGE) 500 mg tablet Take 1 Tablet by mouth two (2) times daily (with meals). 1/11/22   Keon Gustafson MD   ranolazine ER (Ranexa) 500 mg SR tablet Take 1 Tablet by mouth two (2) times a day. 1/11/22   Keon Gustafson MD   eplerenone (INSPRA) 50 mg tab tablet Take 1 Tablet by mouth daily. 1/11/22   Keon Gustafson MD   isosorbide mononitrate ER (IMDUR) 30 mg tablet Take 1 Tablet by mouth every twelve (12) hours. 1/4/22   Brad Soulier, NP   hydrALAZINE (APRESOLINE) 50 mg tablet TAKE 1 TABLET BY MOUTH THREE TIMES DAILY 12/17/21   Tia JACOBSON NP   dapagliflozin Eilleen Apley) 10 mg tab tablet Take 1 Tablet by mouth daily. 11/12/21   Lidia Delacruz NP   diphenhydrAMINE (Benadryl Allergy) 25 mg tablet Take 25 mg by mouth every six (6) hours as needed. Needed every night d/t picc line causing itching    Provider, Historical   glucose blood VI test strips (OneTouch Ultra Test) strip USE TO TEST BLOOD SUGAR DAILY 7/20/21   Keon Gustafson MD   OneTouch Delica Plus Lancet 33 gauge misc USE TO TEST BLOOD SUGAR DAILY 7/20/21   Keon Gustafson MD   omeprazole (PRILOSEC) 20 mg capsule TAKE 1 CAPSULE BY MOUTH DAILY FOR INDIGESTION 7/6/21   Romeo PAREDES NP   rosuvastatin (CRESTOR) 10 mg tablet TAKE 1 TABLET NIGHTLY 4/26/21   Elizabet Marion MD   tamsulosin (FLOMAX) 0.4 mg capsule TAKE 1 CAPSULE DAILY 3/28/21   Elizabet Marion MD   lancets misc Use to test blood sugar daily 6/16/20   Keon Gustafson MD   acetaminophen (TYLENOL EXTRA STRENGTH) 500 mg tablet Take  by mouth every six (6) hours as needed. Provider, Historical   aspirin delayed-release 81 mg tablet Take 81 mg by mouth daily.     Provider, Historical     Allergies   Allergen Reactions    Oxycodone Anaphylaxis    Pcn [Penicillins] Hives balance, coordination, kinesthetic sense, posture, motor skill, proprioception of scapular, scapulothoracic and UE control with self care, reaching, carrying, lifting, house/yardwork, driving/computer work      Access Code [email or web address]    Manual Treatments:  PROM / STM / Oscillations-Mobs:  G-I, II, III, IV (PA's, Inf., Post.)  [] (78697) Provided manual therapy to mobilize soft tissue/joints of cervical/CT, scapular GHJ and UE for the purpose of modulating pain, promoting relaxation,  increasing ROM, reducing/eliminating soft tissue swelling/inflammation/restriction, improving soft tissue extensibility and allowing for proper ROM for normal function with self care, reaching, carrying, lifting, house/yardwork, driving/computer work    Modalities:     [] GAME READY (VASO)- for significant edema, swelling, pain control. Charges:  Timed Code Treatment Minutes: 45'    Total Treatment Minutes: 45'       [] EVAL (LOW) 14646 (typically 20 minutes face-to-face)  [] EVAL (MOD) 18431 (typically 30 minutes face-to-face)  [] EVAL (HIGH) 47469 (typically 45 minutes face-to-face)  [] RE-EVAL     [x] KJ(05636) x  1  [] IONTO  [x] NMR (54914) x 1    [] VASO  [x] Manual (16639) x  1   [] Other:  [] TA x      [] Mech Traction (10637)  [] ES(attended) (48750)      [] ES (un) (48243):         ASSESSMENT:        GOALS:   Patient stated goal: reach with little to no pain, light weight lifting     [] Progressing: [] Met: [] Not Met: [] Adjusted    Therapist goals for Patient:   Short Term Goals: To be achieved in: 2 weeks  1. Independent in HEP and progression per patient tolerance, in order to prevent re-injury. [] Progressing: [] Met: [] Not Met: [] Adjusted   2. Patient will have a decrease in pain to facilitate improvement in movement, function, and ADLs as indicated by Functional Deficits. [] Progressing: [] Met: [] Not Met: [] Adjusted    Long Term Goals: To be achieved in: 4-6 weeks  1.  Patient will score greater than or Family History   Problem Relation Age of Onset    Heart Disease Mother         MI    Heart Disease Father         CAD & PVD    Lung Disease Father     Cancer Father         lung    Diabetes Maternal Grandmother     Heart Disease Other         CAD    Stroke Sister       Social History:  reports that he has never smoked. He has never used smokeless tobacco. He reports current alcohol use. He reports that he does not use drugs. Family and social history were personally reviewed, all pertinent and relevant details are outlined as above. Objective:     Visit Vitals  /61 (BP 1 Location: Left upper arm, BP Patient Position: Sitting)   Pulse 78   Temp 97.5 °F (36.4 °C)   Resp 20   Ht 6' 1\" (1.854 m)   Wt 77.6 kg (171 lb 1.2 oz)   SpO2 100%   BMI 22.57 kg/m²      O2 Device: None (Room air)    PHYSICAL EXAM:   General: Alert, awake, no acute distress  HEENT: PEERL,  moist mucus membranes  Neck: Supple,   Chest: Decreased basal breath  CVS: S1-S2 were heard  Abd: Soft, non-tender, non-distended, +bowel sounds   Ext: No clubbing, no cyanosis, no edema  Neuro/Psych: No focal neurological deficits  Cap refill: Brisk, less than 3 seconds  Pulses: 2+, symmetric in all extremities  Skin: Warm, dry, without rashes or lesions    Data Review: All diagnostic labs and studies have been reviewed.     Abnormal Labs Reviewed   PROTHROMBIN TIME + INR - Abnormal; Notable for the following components:       Result Value    INR 1.2 (*)     Prothrombin time 12.0 (*)     All other components within normal limits   URINALYSIS W/ RFLX MICROSCOPIC - Abnormal; Notable for the following components:    Glucose >1,000 (*)     All other components within normal limits   NT-PRO BNP - Abnormal; Notable for the following components:    NT pro-BNP 3,009 (*)     All other components within normal limits   METABOLIC PANEL, COMPREHENSIVE - Abnormal; Notable for the following components:    Sodium 132 (*)     Glucose 134 (*)     BUN 21 (*) equal to 73/100 on FOTO shoulder to assist with reaching prior level of function. [] Progressing: [] Met: [] Not Met: [] Adjusted  2. Patient will demonstrate increased L shoulder flex AROM to greater than or equal to 170 deg, ABD AROM to greater than or equal to 145 deg, and ER AROM to greater than or equal to 90 deg to allow for proper joint functioning as indicated by patients Functional Deficits. [] Progressing: [] Met: [] Not Met: [] Adjusted  3. Patient will demonstrate an increase in L shoulder (flex, ABD, IR, and ER) strength to 4+/5  to allow for proper functional mobility as indicated by patients Functional Deficits. [] Progressing: [] Met: [] Not Met: [] Adjusted  4. Patient will return to ADLs without increased symptoms or restriction. [] Progressing: [] Met: [] Not Met: [] Adjusted  5. Patient will return to independent workout pain free. [] Progressing: [] Met: [] Not Met: [] Adjusted       Overall Progression Towards Functional goals/ Treatment Progress Update:  [] Patient is progressing as expected towards functional goals listed. [] Progression is slowed due to complexities/Impairments listed. [] Progression has been slowed due to co-morbidities. [x] Plan just implemented, too soon to assess goals progression <30days   [] Goals require adjustment due to lack of progress  [] Patient is not progressing as expected and requires additional follow up with physician  [] Other    Prognosis for POC: [x] Good [] Fair  [] Poor      Patient requires continued skilled intervention: [x] Yes  [] No      Treatment/Activity Tolerance:  [x] Patient tolerated treatment well [] Patient limited by fatique  [] Patient limited by pain  [] Patient limited by other medical complications  [] Other: Pt genie session well. She genie manual therapy well today pain free. She is progressing appropriately with shoulder strength.    10/26    Patient education: Patient education on PT and plan of care including AST (SGOT) 60 (*)     Alk. phosphatase 191 (*)     A-G Ratio 1.0 (*)     All other components within normal limits   CBC WITH AUTOMATED DIFF - Abnormal; Notable for the following components:    WBC 3.0 (*)     HGB 11.8 (*)     MCV 74.8 (*)     MCH 23.4 (*)     RDW 24.0 (*)     MONOCYTES 14 (*)     ABS. LYMPHOCYTES 0.5 (*)     All other components within normal limits   GLUCOSE, POC - Abnormal; Notable for the following components:    Glucose (POC) 148 (*)     All other components within normal limits       All Micro Results     Procedure Component Value Units Date/Time    URINE CULTURE HOLD SAMPLE [[id number]] Collected: 03/01/22 1324    Order Status: Completed Specimen: Serum Updated: 03/01/22 1359     Urine culture hold       Urine on hold in Microbiology dept for 2 days. If unpreserved urine is submitted, it cannot be used for addtional testing after 24 hours, recollection will be required. COVID-19 RAPID TEST [[id number]]     Order Status: Sent           IMAGING:   No orders to display        ECG/ECHO:    Results for orders placed or performed during the hospital encounter of 03/01/22   EKG, 12 LEAD, INITIAL   Result Value Ref Range    Ventricular Rate 75 BPM    Atrial Rate 74 BPM    QRS Duration 120 ms    Q-T Interval 436 ms    QTC Calculation (Bezet) 486 ms    Calculated R Axis 81 degrees    Calculated T Axis 110 degrees    Diagnosis       Atrial fibrillation with premature ventricular or aberrantly conducted   complexes  Low voltage QRS  Septal infarct (cited on or before 01-FEB-2022)  When compared with ECG of 02-FEB-2022 03:25,  Atrial fibrillation has replaced Sinus rhythm          Assessment:   Given the patient's current clinical presentation, there is a high level of concern for decompensation if discharged from the emergency department.  Complex decision making was performed, which includes reviewing the patient's available past medical records, laboratory results, and imaging diagnosis, prognosis, treatment goals and options. Patient agrees with discussed POC and treatment and is aware of rehab process. 10/17      PLAN: See eval 10/17  [] Continue per plan of care [] Alter current plan (see comments above)  [x] Plan of care initiated [] Hold pending MD visit [] Discharge      Electronically signed by:  Tashi Davison, PT, DPT    Note: If patient does not return for scheduled/ recommended follow up visits, this note will serve as a discharge from care along with most recent update on progress. studies. Acute on chronic systolic CHF NYHA IV  Coronary artery disease  Hypertension  Hyperlipidemia  Diabetes mellitus type 2    Plan:   65 y/o male with h/o severe ischemic cardiomyopathy, LVEF 20-25%; stage D, NYHA class IV symptoms on chronic inotropic support with milrinone gtt; patient unerwent LVAD-DT evaluation which was placed on hold due to discovery of lung nodule posittive for adenocarcinoma which required radiation therapy and ICD explant/reimplant; patient did not complete evaluation (EGD/C-scope needed) as he has not been decided if he would like to proceed with LV, patient presented for completion of LVAD evaluation and awaiting colonoscopy and endoscopy, continue milrinone GTT, clear liquid diet and n.p.o. after midnight for EGD and colonoscopy in the morning, continue eplerenone, potassium replacement, hydralazine, Bumex 2 mg p.o. twice daily, aspirin, Effient, Crestor  Strict I's and O's, daily weights,  Optimize blood pressure control  Sliding-scale insulin, Accu-Cheks, diet control, close monitor            *    CRITICAL CARE WAS PERFORMED FOR THIS ENCOUNTER: YES. I had a face to face encounter with the patient, reviewed and interpreted patient data including clinical events, labs, images, vital signs, I/O's, and examined patient. I have discussed the case and the plan and management of the patient's care with the consulting services, the bedside nurses and necessary ancillary providers. This patient has a high probability of imminent, clinically significant deterioration, which requires the highest level of preparedness to intervene urgently. I participated in the decision-making and personally managed or directed the management of the following life and organ supporting interventions that required my frequent assessment to treat or prevent imminent deterioration. I personally spent 40 minutes of critical care time.   This is time spent at this critically ill patient's bedside actively involved in patient care as well as the coordination of care and discussions with the patient's family. This does not include any procedural time which has been billed separately. Signed By: Angie Winter MD     March 1, 2022         Please note that this dictation may have been completed with Dragon, the computer voice recognition software. Quite often unanticipated grammatical, syntax, homophones, and other interpretive errors are inadvertently transcribed by the computer software. Please disregard these errors. Please excuse any errors that have escaped final proofreading.

## 2022-11-01 ENCOUNTER — APPOINTMENT (OUTPATIENT)
Dept: PHYSICAL THERAPY | Age: 52
End: 2022-11-01
Payer: COMMERCIAL

## 2022-11-02 ENCOUNTER — HOSPITAL ENCOUNTER (OUTPATIENT)
Dept: PHYSICAL THERAPY | Age: 52
Setting detail: THERAPIES SERIES
Discharge: HOME | End: 2022-11-02
Payer: COMMERCIAL

## 2022-11-02 PROCEDURE — 97140 MANUAL THERAPY 1/> REGIONS: CPT | Performed by: PHYSICAL THERAPIST

## 2022-11-02 PROCEDURE — 97112 NEUROMUSCULAR REEDUCATION: CPT | Performed by: PHYSICAL THERAPIST

## 2022-11-02 PROCEDURE — 97110 THERAPEUTIC EXERCISES: CPT | Performed by: PHYSICAL THERAPIST

## 2022-11-02 NOTE — FLOWSHEET NOTE
[facility], [address] [place], [address]  Phone: [phone]   Fax: [phone]    Physical Therapy Treatment Note/ Progress Report:       Date:  2022    Patient Name:  Staci Meza    :  1970  MRN: [id number]  Restrictions/Precautions:    Medical/Treatment Diagnosis Information:  Diagnosis: M75.42 (ICD-10-CM) - Impingement syndrome of left shoulder  Treatment Diagnosis: decreased ROM, strength, posture, endurance, and high-level iADLs  Insurance/Certification information:  PT Insurance Information: [place]  Physician Information:  Dr. Maulik Dunham  Has the plan of care been signed (Y/N):        []  Yes  [x]  No     Date of Patient follow up with Physician:       Is this a Progress Report:     []  Yes  [x]  No        Progress report/ Recertification will be due (10 Rx or 30 days whichever is less): 2022       Visit # Insurance Allowable Auth Required   5 30 []  Yes []  No        Functional Scale:  FOTO Shoulder  10/17 - 55/100         Latex Allergy:  [x]NO      []YES  Preferred Language for Healthcare:   [x]English       []other:      Pain level:  3-4 /10      SUBJECTIVE:  States the shoulder is fine if she doesn't use it. However, if she uses it, she has pain. She states she was really sore after last session. She states lifting anything increases pain. Sleeping has been fine. HEP is going well. She states moving the shoulder up and down is fine, but going out to the side increases pain.         OBJECTIVE: See crescencio 10/17  Observation:   Test measurements:      RESTRICTIONS/PRECAUTIONS:     Exercises/Interventions:   Exercise/Equipment Resistance/Repetitions Other comments   Stretching/PROM     Wand     Table Slides     ER cane - supine 10 sec x 10  Added 10/17   Pulleys 10 sec x 10 ABD Added 10/17   Thoracic ext over foam roller 10 sec x 10  Added 10/19             Isometrics     Retraction 10 sec x 10 Added 10/17        Weight shift     Flexion     Abduction Added 10/17   External Rotation 10 sec x 10  Added 10/17   Internal Rotation Added 10/19   Biceps     Triceps          PRE's     Flexion     Abduction     External Rotation Internal Rotation Shrugs     EXT     Reverse Flys     Serratus 10 x 3 3# ^11/2   Horizontal Abd with ER     Biceps 10 x 1 3# ECL   10 x 1 5# ECL Added 11/2   Triceps     Retraction          Cable Column/Theraband     External Rotation     Internal Rotation     Shrugs     Lats 10 x 3 black TB Added 11/2   Ext 10 x 3 green TB  Added 10/19   Flex     Scapular Retraction 10 x 3 blue TB  Added 10/26   BIC     TRIC     PNF     Prone T 10 x 3 Added 11/2        Dynamic Stability     Supine ABCs Ball on the wall 10 x 3 each 2-way, kickball Added 11/2        Plyoback          Manual interventions     L shoulder stretching - flex, ABD, IR, and ER with STM to anterior shoulder 8' Added 11/2              Therapeutic Exercise and NMR EXR  [x] (11643) Provided verbal/tactile cueing for activities related to strengthening, flexibility, endurance, ROM  for improvements in scapular, scapulothoracic and UE control with self care, reaching, carrying, lifting, house/yardwork, driving/computer work. [x] (80833) Provided verbal/tactile cueing for activities related to improving balance, coordination, kinesthetic sense, posture, motor skill, proprioception  to assist with  scapular, scapulothoracic and UE control with self care, reaching, carrying, lifting, house/yardwork, driving/computer work. Therapeutic Activities:    [x] (81397 or 47122) Provided verbal/tactile cueing for activities related to improving balance, coordination, kinesthetic sense, posture, motor skill, proprioception and motor activation to allow for proper function of scapular, scapulothoracic and UE control with self care, carrying, lifting, driving/computer work.      Home Exercise Program:    [x] (80462) Reviewed/Progressed HEP activities related to strengthening, flexibility, endurance, ROM of scapular, scapulothoracic and UE control with self care, reaching, carrying, lifting, house/yardwork, driving/computer work  [] (97448) Reviewed/Progressed HEP activities related to improving balance, coordination, kinesthetic sense, posture, motor skill, proprioception of scapular, scapulothoracic and UE control with self care, reaching, carrying, lifting, house/yardwork, driving/computer work      Access Code [id number]    Manual Treatments:  PROM / STM / Oscillations-Mobs:  G-I, II, III, IV (PA's, Inf., Post.)  [] (07495) Provided manual therapy to mobilize soft tissue/joints of cervical/CT, scapular GHJ and UE for the purpose of modulating pain, promoting relaxation,  increasing ROM, reducing/eliminating soft tissue swelling/inflammation/restriction, improving soft tissue extensibility and allowing for proper ROM for normal function with self care, reaching, carrying, lifting, house/yardwork, driving/computer work    Modalities:  CP - 15' 11/2   [] GAME READY (VASO)- for significant edema, swelling, pain control. Charges:  Timed Code Treatment Minutes: 39'   Total Treatment Minutes: 61'      [] EVAL (LOW) 41768 (typically 20 minutes face-to-face)  [] EVAL (MOD) 97416 (typically 30 minutes face-to-face)  [] EVAL (HIGH) 84847 (typically 45 minutes face-to-face)  [] RE-EVAL     [x] OV(88925) x  1  [] IONTO  [x] NMR (85001) x 1    [] VASO  [x] Manual (03697) x  1   [] Other:  [] TA x      [] Mech Traction (74584)  [] ES(attended) (03451)      [] ES (un) (53382):         ASSESSMENT:        GOALS:   Patient stated goal: reach with little to no pain, light weight lifting     [] Progressing: [] Met: [] Not Met: [] Adjusted    Therapist goals for Patient:   Short Term Goals: To be achieved in: 2 weeks  1. Independent in HEP and progression per patient tolerance, in order to prevent re-injury. [] Progressing: [] Met: [] Not Met: [] Adjusted   2. Patient will have a decrease in pain to facilitate improvement in movement, function, and ADLs as indicated by Functional Deficits. [] Progressing: [] Met: [] Not Met: [] Adjusted    Long Term Goals: To be achieved in: 4-6 weeks  1. Patient will score greater than or equal to 73/100 on FOTO shoulder to assist with reaching prior level of function. [] Progressing: [] Met: [] Not Met: [] Adjusted  2. Patient will demonstrate increased L shoulder flex AROM to greater than or equal to 170 deg, ABD AROM to greater than or equal to 145 deg, and ER AROM to greater than or equal to 90 deg to allow for proper joint functioning as indicated by patients Functional Deficits. [] Progressing: [] Met: [] Not Met: [] Adjusted  3. Patient will demonstrate an increase in L shoulder (flex, ABD, IR, and ER) strength to 4+/5  to allow for proper functional mobility as indicated by patients Functional Deficits. [] Progressing: [] Met: [] Not Met: [] Adjusted  4. Patient will return to ADLs without increased symptoms or restriction. [] Progressing: [] Met: [] Not Met: [] Adjusted  5. Patient will return to independent workout pain free. [] Progressing: [] Met: [] Not Met: [] Adjusted       Overall Progression Towards Functional goals/ Treatment Progress Update:  [] Patient is progressing as expected towards functional goals listed. [] Progression is slowed due to complexities/Impairments listed. [] Progression has been slowed due to co-morbidities.   [x] Plan just implemented, too soon to assess goals progression <30days   [] Goals require adjustment due to lack of progress  [] Patient is not progressing as expected and requires additional follow up with physician  [] Other    Prognosis for POC: [x] Good [] Fair  [] Poor      Patient requires continued skilled intervention: [x] Yes  [] No      Treatment/Activity Tolerance:  [x] Patient tolerated treatment well [] Patient limited by fatique  [] Patient limited by pain  [] Patient limited by other medical complications  [] Other: Pt genie session well. Session was modified due to pt's pain. She genie the addition of prone Ts and ball on the wall pain free. She genie the addition of eccentric biceps curls. Will re-assess next session. 11/2    Patient education: Patient education on PT and plan of care including diagnosis, prognosis, treatment goals and options. Patient agrees with discussed POC and treatment and is aware of rehab process. 10/17      PLAN: See eval 10/17  [] Continue per plan of care [] Alter current plan (see comments above)  [x] Plan of care initiated [] Hold pending MD visit [] Discharge      Electronically signed by:  John Fuller, PT, DPT    Note: If patient does not return for scheduled/ recommended follow up visits, this note will serve as a discharge from care along with most recent update on progress.

## 2022-11-09 ENCOUNTER — HOSPITAL ENCOUNTER (OUTPATIENT)
Dept: PHYSICAL THERAPY | Age: 52
Setting detail: THERAPIES SERIES
Discharge: HOME | End: 2022-11-09
Payer: COMMERCIAL

## 2022-11-09 ENCOUNTER — APPOINTMENT (OUTPATIENT)
Dept: PHYSICAL THERAPY | Age: 52
End: 2022-11-09
Payer: COMMERCIAL

## 2022-11-09 PROCEDURE — 97110 THERAPEUTIC EXERCISES: CPT | Performed by: PHYSICAL THERAPIST

## 2022-11-09 PROCEDURE — 97530 THERAPEUTIC ACTIVITIES: CPT | Performed by: PHYSICAL THERAPIST

## 2022-11-09 NOTE — PLAN OF CARE
[facility], [address] [place], [address]  Phone: [phone]   Fax: [phone]       Physical Therapy Re-Certification Plan of Care    Dear Dr. Spike Bernal,    We had the pleasure of treating the following patient for physical therapy services at [address] [place] [address]. A summary of our findings can be found in the updated assessment below. This includes our plan of care. If you have any questions or concerns regarding these findings, please do not hesitate to contact me at the office phone number checked above. Thank you for the referral.     Physician Signature:________________________________Date:__________________  By signing above (or electronic signature), therapists plan is approved by physician      Overall Response to Treatment:   []Patient is responding well to treatment and improvement is noted with regards  to goals   []Patient should continue to improve in reasonable time if they continue HEP   []Patient has plateaued and is no longer responding to skilled PT intervention    [x]Patient is getting worse and would benefit from return to referring MD   []Patient unable to adhere to initial POC   [x]Other: Pt presents with continued symptoms. She demonstrates improved L shoulder flexion, but continues to lack shoulder ABD AROM. She does demonstrate improved shoulder and scapular strength, but she continues to have pain with resisted shoulder ABD.  She see MD next week and will hold PT until that appointment and continue per MD.       Date range of previous POC Visits: 10/17/22 - 22    Total Visits: 6        Physical Therapy Treatment Note/ Progress Report:       Date:  2022    Patient Name:  Merilynn Snellen    :  1970  MRN: [id number]  Restrictions/Precautions:    Medical/Treatment Diagnosis Information:  Diagnosis: M75.42 (ICD-10-CM) - Impingement syndrome of left shoulder  Treatment Diagnosis: decreased ROM, strength, posture, endurance, and high-level iADLs  Insurance/Certification information:  PT Insurance Information: [employer]  Physician Information:  Dr. Namrata Ordaz  Has the plan of care been signed (Y/N):        []  Yes  [x]  No     Date of Patient follow up with Physician:       Is this a Progress Report:     []  Yes  [x]  No        Progress report/ Recertification will be due (10 Rx or 30 days whichever is less): 7 Nov 2022      Visit # Insurance Allowable Auth Required   6 30 []  Yes []  No        Functional Scale:  FOTO Shoulder  10/17 - 55/100 11/9 - 59/100        Latex Allergy:  [x]NO      []YES  Preferred Language for Healthcare:   [x]English       []other:      Pain level:  2 /10 11/9     SUBJECTIVE:  States her pain is about the same. She states no difference with chores at home or at work. She states HEP is going well. 11/9    OBJECTIVE: 11/9  Observation:   Test measurements:            ROM AROM  Comment     L R     Flexion 175  + pain 174     Abduction 121 + pain  148     ER 75   93     IR 75 60     Other(cervical)         Other                Strength L R Comment   Flexion 4/5 4/5     Abduction 4/5 + pain  4/5     ER 4/5 4/5     IR 4/5 4/5                         Lower Trap  4-/5       Mid Trap 4/5 4-/5     Rhomboids 4/5 4/5     Biceps 4+/5 4/5     Triceps 4+/5 4/5        Special Tests Results/Comment   Javier     Neers     Speeds     OBriens     Other        Reflexes/Sensation:               [x]Dermatomes/Myotomes intact               [x]Reflexes equal and normal bilaterally              []Other:     Joint mobility:               [x]Normal                       []Hypo              []Hyper     Palpation:     Functional Mobility/Transfers: decreased WB through LUE     Posture: rounded shoulders      Gait: (include devices/WB status):  WNL         RESTRICTIONS/PRECAUTIONS:     Exercises/Interventions:   Exercise/Equipment Resistance/Repetitions Other comments   Stretching/PROM     Wand     Table Slides     ER cane - supine 10 sec x 10  Added 10/17   Pulleys 10 sec x 10 ABD Added 10/17   Thoracic ext over foam roller           Isometrics     Retraction      Weight shift     Flexion     Abduction Added 10/17   External Rotation 10 sec x 10  Added 10/17   Internal Rotation Added 10/19   Biceps     Triceps          PRE's     Flexion     Abduction     External Rotation Internal Rotation Shrugs     EXT     Reverse Flys     Serratus 10 x 3 3# ^11/2   Horizontal Abd with ER     Biceps 10 x 1 5# ECL  ^11/9   Triceps     Retraction          Cable Column/Theraband     External Rotation     Internal Rotation     Shrugs     Lats 10 x 3 black TB Added 11/2   Ext 10 x 3 green TB  Added 10/19   Flex     Scapular Retraction 10 x 3 blue TB  Added 10/26   BIC     TRIC     PNF     Prone T 10 x 3 Added 11/2   Prone Y 10 x 3  Added 11/9        Dynamic Stability     Supine ABCs Ball on the wall 10 x 3 each 2-way, kickball Added 11/2        Plyoback          Manual interventions     L shoulder stretching - flex, ABD, IR, and ER with STM to anterior shoulder            Therapeutic Exercise and NMR EXR  [x] (16349) Provided verbal/tactile cueing for activities related to strengthening, flexibility, endurance, ROM  for improvements in scapular, scapulothoracic and UE control with self care, reaching, carrying, lifting, house/yardwork, driving/computer work. [x] (98000) Provided verbal/tactile cueing for activities related to improving balance, coordination, kinesthetic sense, posture, motor skill, proprioception  to assist with  scapular, scapulothoracic and UE control with self care, reaching, carrying, lifting, house/yardwork, driving/computer work.     Therapeutic Activities:    [x] (81278 or 60656) Provided verbal/tactile cueing for activities related to improving balance, coordination, kinesthetic sense, posture, motor skill, proprioception and motor activation to allow for Goals: To be achieved in: 2 weeks  1. Independent in HEP and progression per patient tolerance, in order to prevent re-injury. [] Progressing: [x] Met: [] Not Met: [] Adjusted   2. Patient will have a decrease in pain to facilitate improvement in movement, function, and ADLs as indicated by Functional Deficits. [] Progressing: [x] Met: [] Not Met: [] Adjusted    Long Term Goals: To be achieved in: 4-6 weeks  1. Patient will score greater than or equal to 73/100 on FOTO shoulder to assist with reaching prior level of function. [] Progressing: [] Met: [x] Not Met: [] Adjusted  2. Patient will demonstrate increased L shoulder flex AROM to greater than or equal to 170 deg, ABD AROM to greater than or equal to 145 deg, and ER AROM to greater than or equal to 90 deg to allow for proper joint functioning as indicated by patients Functional Deficits. [x] Progressing: [] Met: [] Not Met: [] Adjusted  3. Patient will demonstrate an increase in L shoulder (flex, ABD, IR, and ER) strength to 4+/5  to allow for proper functional mobility as indicated by patients Functional Deficits. [] Progressing: [] Met: [x] Not Met: [] Adjusted  4. Patient will return to ADLs without increased symptoms or restriction. [] Progressing: [] Met: [x] Not Met: [] Adjusted  5. Patient will return to independent workout pain free. [] Progressing: [] Met: [x] Not Met: [] Adjusted       Overall Progression Towards Functional goals/ Treatment Progress Update:  [x] Patient is progressing as expected towards functional goals listed. [] Progression is slowed due to complexities/Impairments listed. [] Progression has been slowed due to co-morbidities.   [] Plan just implemented, too soon to assess goals progression <30days   [] Goals require adjustment due to lack of progress  [] Patient is not progressing as expected and requires additional follow up with physician  [] Other    Prognosis for POC: [x] Good [] Fair  [] Poor      Patient requires continued skilled intervention: [x] Yes  [] No      Treatment/Activity Tolerance:  [x] Patient tolerated treatment well [] Patient limited by fatique  [] Patient limited by pain  [] Patient limited by other medical complications  [] Other: Pt presents with continued symptoms. She demonstrates improved L shoulder flexion, but continues to lack shoulder ABD AROM. She does demonstrate improved shoulder and scapular strength, but she continues to have pain with resisted shoulder ABD. She see MD next week and will hold PT until that appointment and continue per MD.    11/9    Patient education: Patient education on PT and plan of care including diagnosis, prognosis, treatment goals and options. Patient agrees with discussed POC and treatment and is aware of rehab process. 10/17      PLAN: Hold pending MD visit (11/9/22)   [] Continue per plan of care [] Alter current plan (see comments above)  [] Plan of care initiated [x] Hold pending MD visit [] Discharge      Electronically signed by:  Tashi Davison PT, DPT    Note: If patient does not return for scheduled/ recommended follow up visits, this note will serve as a discharge from care along with most recent update on progress.

## 2022-11-16 ENCOUNTER — OFFICE VISIT (OUTPATIENT)
Dept: ORTHOPEDIC SURGERY | Age: 52
End: 2022-11-16
Payer: COMMERCIAL

## 2022-11-16 DIAGNOSIS — M75.42 IMPINGEMENT SYNDROME OF LEFT SHOULDER: Primary | ICD-10-CM

## 2022-11-16 PROCEDURE — 20610 DRAIN/INJ JOINT/BURSA W/O US: CPT | Performed by: STUDENT IN AN ORGANIZED HEALTH CARE EDUCATION/TRAINING PROGRAM

## 2022-11-16 RX ORDER — LIDOCAINE HYDROCHLORIDE 10 MG/ML
2 INJECTION, SOLUTION INFILTRATION; PERINEURAL ONCE
Status: COMPLETED | OUTPATIENT
Start: 2022-11-16 | End: 2022-11-16

## 2022-11-16 RX ORDER — BETAMETHASONE SODIUM PHOSPHATE AND BETAMETHASONE ACETATE 3; 3 MG/ML; MG/ML
12 INJECTION, SUSPENSION INTRA-ARTICULAR; INTRALESIONAL; INTRAMUSCULAR; SOFT TISSUE ONCE
Status: COMPLETED | OUTPATIENT
Start: 2022-11-16 | End: 2022-11-16

## 2022-11-16 RX ORDER — BUPIVACAINE HYDROCHLORIDE 2.5 MG/ML
2 INJECTION, SOLUTION INFILTRATION; PERINEURAL ONCE
Status: COMPLETED | OUTPATIENT
Start: 2022-11-16 | End: 2022-11-16

## 2022-11-16 RX ADMIN — LIDOCAINE HYDROCHLORIDE 2 ML: 10 INJECTION, SOLUTION INFILTRATION; PERINEURAL at 16:53

## 2022-11-16 RX ADMIN — BETAMETHASONE SODIUM PHOSPHATE AND BETAMETHASONE ACETATE 12 MG: 3; 3 INJECTION, SUSPENSION INTRA-ARTICULAR; INTRALESIONAL; INTRAMUSCULAR; SOFT TISSUE at 16:49

## 2022-11-16 RX ADMIN — BUPIVACAINE HYDROCHLORIDE 5 MG: 2.5 INJECTION, SOLUTION INFILTRATION; PERINEURAL at 16:51

## 2022-11-16 NOTE — PROGRESS NOTES
Chief Complaint   Patient presents with    Follow-up     F/u left shoulder. Not been feeling much better. Has been going to PT and doing home exercises. HPI:      Sreedhar Pollock is a 45 y.o. female who presents for follow-up evaluation of left shoulder pain. I last saw her about 1 month ago for left shoulder pain. She has been taking mobic and started PT. She reports no significant improvement in her symptoms since our last visit and she has difficulty doing the exercises at times due to pain. Past Medical History:   Diagnosis Date    HIGH CHOLESTEROL     Hyperlipidemia     Hypertension     Vitamin D deficiency        Medication  Current Outpatient Medications   Medication Sig Dispense Refill    meloxicam (MOBIC) 15 MG tablet TAKE 1 TABLET BY MOUTH DAILY AS NEEDED FOR PAIN 30 tablet 5    lisinopril (PRINIVIL;ZESTRIL) 10 MG tablet TAKE 1 TABLET BY MOUTH DAILY 30 tablet 11    vitamin D 25 MCG (1000 UT) CAPS Take 4 capsules by mouth daily Stop vitamin D2 120 capsule 12    mometasone (ELOCON) 0.1 % cream Apply topically daily to rash prn 15 g 1    triamcinolone (KENALOG) 0.1 % cream Apply topically 2 times daily. 30 g 2     No current facility-administered medications for this visit. Allergies  Allergies   Allergen Reactions    Tetanus Toxoids Other (See Comments)     Hot, swollen, tender       Review of Systems:  Pertinent items are noted in HPI. Physical Examination: This is a pleasant female, alert, and in no acute distress. There were no vitals filed for this visit. Left shoulder exam: Full active range of motion in all planes, but painful arc. Positive full can and empty can, but strength is intact. 5/5 strength in all planes. Pain was reproduced with resisted external rotation. Positive Taylor, positive Neer's.     Vascular exam: Extremities warm and well perfused, no significant edema    Respiratory exam: Breathing easy and unlabored    Neuro exam: No focal neuro deficits    Lymphatic: No obvious lymphadenopathy    Skin: Warm, dry    Radiology:     3 view X-rays of the left shoulder dated 10/12/22 were reviewed and interpreted independently today and discussed with the patient. The films revealed: Acute osseous abnormalities. Mild before meals and glenohumeral joint arthrosis. Assessment and Plan:     1. Impingement syndrome of left shoulder  Her symptoms and physical exam remain consistent with impingement syndrome and tendinitis of the left shoulder. She has been going to physical therapy and doing her home exercises for the past month but has not had significant improvement in her symptoms and her ability to complete physical therapy has been somewhat limited by pain. We discussed the risks and benefits of a subacromial steroid injection today and she decided that she would like to try this and then resume physical therapy. - Resume PT  - Subacromial steroid injection into the left shoulder (see procedure note below)  - Continue Mobic    Follow-up: 3 weeks. Sooner with any problems, questions, concerns, or worsening symptoms. Subacromial Shoulder Injection Procedure Note    Side: left     Procedure Details     Verbal consent was obtained for the procedure. The shoulder was prepped with an alcohol swab and the skin was anesthetized with Ethyl Chloride. A 25 gauge 1.5\" needle was advanced into the subacromial space through posterior approach without difficulty  The space was then injected with 2 ml 1% lidocaine, 2ml of marcaine, and 2 ml of betamethasone (CELESTONE). The injection site was cleansed with isopropyl alcohol and a dressing was applied. Complications:  None; patient tolerated the procedure well. Alyssa Sarkar MD  [facility]    Please note that this chart was at least partially generated using dragon dictation software.   Although every effort was made to ensure the accuracy of this automated transcription, some errors in transcription may have occurred.

## 2022-11-21 ENCOUNTER — HOSPITAL ENCOUNTER (OUTPATIENT)
Dept: PHYSICAL THERAPY | Age: 52
Setting detail: THERAPIES SERIES
Discharge: HOME | End: 2022-11-21
Payer: COMMERCIAL

## 2022-11-21 PROCEDURE — 97112 NEUROMUSCULAR REEDUCATION: CPT | Performed by: PHYSICAL THERAPIST

## 2022-11-21 PROCEDURE — 97530 THERAPEUTIC ACTIVITIES: CPT | Performed by: PHYSICAL THERAPIST

## 2022-11-21 PROCEDURE — 97110 THERAPEUTIC EXERCISES: CPT | Performed by: PHYSICAL THERAPIST

## 2022-11-21 NOTE — FLOWSHEET NOTE
[address] [place] [address] [facility], [address] [facility], [address]  Phone: [phone]   Fax: [phone]      Physical Therapy Treatment Note/ Progress Report:       Date:  2022    Patient Name:  Munir Luke    :  1970  MRN: [id number]  Restrictions/Precautions:    Medical/Treatment Diagnosis Information:  Diagnosis: M75.42 (ICD-10-CM) - Impingement syndrome of left shoulder  Treatment Diagnosis: decreased ROM, strength, posture, endurance, and high-level iADLs  Insurance/Certification information:  PT Insurance Information: [place]  Physician Information:  Dr. Leland Rae  Has the plan of care been signed (Y/N):        []  Yes  [x]  No     Date of Patient follow up with Physician:       Is this a Progress Report:     []  Yes  [x]  No        Progress report/ Recertification will be due (10 Rx or 30 days whichever is less): 2022      Visit # Insurance Allowable Auth Required   7 30 []  Yes []  No        Functional Scale:  FOTO Shoulder  10/17 - 55/ - /        Latex Allergy:  [x]NO      []YES  Preferred Language for Healthcare:   [x]English       []other:      Pain level:  1 /10 11/21     SUBJECTIVE:  Pt saw the MD last week and received a cortisone shot. She states she feels good. She states she forgets that it is hurt most of the time, but every once in awhile she will feel a little pressure to remind her she can't go back to full PLOF.   She states HEP is going well.       OBJECTIVE:   Observation:   Test measurements:            ROM AROM  Comment     L R     Flexion 176 174     Abduction 130 148     ER 80   93     IR 78 60     Other(cervical)         Other                Strength L R Comment   Flexion 4/5 4/5     Abduction 4/5 + pain  4/5     ER 4/5 4/5     IR 4/5 4/5                         Lower Trap  4-/5       Mid Trap 4/5 4-/5     Rhomboids 4/5 4/5     Biceps 4+/5 4/5     Triceps 4+/5 4/5        Special house/yardwork, driving/computer work. [x] (18995) Provided verbal/tactile cueing for activities related to improving balance, coordination, kinesthetic sense, posture, motor skill, proprioception  to assist with  scapular, scapulothoracic and UE control with self care, reaching, carrying, lifting, house/yardwork, driving/computer work. Therapeutic Activities:    [x] (00408 or 12141) Provided verbal/tactile cueing for activities related to improving balance, coordination, kinesthetic sense, posture, motor skill, proprioception and motor activation to allow for proper function of scapular, scapulothoracic and UE control with self care, carrying, lifting, driving/computer work. Home Exercise Program:    [x] (47489) Reviewed/Progressed HEP activities related to strengthening, flexibility, endurance, ROM of scapular, scapulothoracic and UE control with self care, reaching, carrying, lifting, house/yardwork, driving/computer work  [] (94231) Reviewed/Progressed HEP activities related to improving balance, coordination, kinesthetic sense, posture, motor skill, proprioception of scapular, scapulothoracic and UE control with self care, reaching, carrying, lifting, house/yardwork, driving/computer work      Access Code [id number]    Manual Treatments:  PROM / STM / Oscillations-Mobs:  G-I, II, III, IV (PA's, Inf., Post.)  [] (22613) Provided manual therapy to mobilize soft tissue/joints of cervical/CT, scapular GHJ and UE for the purpose of modulating pain, promoting relaxation,  increasing ROM, reducing/eliminating soft tissue swelling/inflammation/restriction, improving soft tissue extensibility and allowing for proper ROM for normal function with self care, reaching, carrying, lifting, house/yardwork, driving/computer work    Modalities:  CP - 10' 11/21   [] GAME READY (VASO)- for significant edema, swelling, pain control.      Charges:  Timed Code Treatment Minutes: 40'    Total Treatment Minutes: 46'       [] EVAL (LOW) 07045 (typically 20 minutes face-to-face)  [] EVAL (MOD) 32049 (typically 30 minutes face-to-face)  [] EVAL (HIGH) 82806 (typically 45 minutes face-to-face)  [] RE-EVAL     [x] FU(18518) x  1  [] IONTO  [x] NMR (00758) x 1     [] VASO  [] Manual (06987) x    [] Other:  [x] TA x  1    [] Mech Traction (14363)  [] ES(attended) (60703)      [] ES (un) (50723):         ASSESSMENT:        GOALS: 11/9  Patient stated goal: reach with little to no pain, light weight lifting     [] Progressing: [] Met: [x] Not Met: [] Adjusted    Therapist goals for Patient:   Short Term Goals: To be achieved in: 2 weeks  1. Independent in HEP and progression per patient tolerance, in order to prevent re-injury. [] Progressing: [x] Met: [] Not Met: [] Adjusted   2. Patient will have a decrease in pain to facilitate improvement in movement, function, and ADLs as indicated by Functional Deficits. [] Progressing: [x] Met: [] Not Met: [] Adjusted    Long Term Goals: To be achieved in: 4-6 weeks  1. Patient will score greater than or equal to 73/100 on FOTO shoulder to assist with reaching prior level of function. [] Progressing: [] Met: [x] Not Met: [] Adjusted  2. Patient will demonstrate increased L shoulder flex AROM to greater than or equal to 170 deg, ABD AROM to greater than or equal to 145 deg, and ER AROM to greater than or equal to 90 deg to allow for proper joint functioning as indicated by patients Functional Deficits. [x] Progressing: [] Met: [] Not Met: [] Adjusted  3. Patient will demonstrate an increase in L shoulder (flex, ABD, IR, and ER) strength to 4+/5  to allow for proper functional mobility as indicated by patients Functional Deficits. [] Progressing: [] Met: [x] Not Met: [] Adjusted  4. Patient will return to ADLs without increased symptoms or restriction. [] Progressing: [] Met: [x] Not Met: [] Adjusted  5. Patient will return to independent workout pain free.    [] Progressing: [] Met: [x] Not Met: [] Adjusted       Overall Progression Towards Functional goals/ Treatment Progress Update:  [x] Patient is progressing as expected towards functional goals listed. [] Progression is slowed due to complexities/Impairments listed. [] Progression has been slowed due to co-morbidities. [] Plan just implemented, too soon to assess goals progression <30days   [] Goals require adjustment due to lack of progress  [] Patient is not progressing as expected and requires additional follow up with physician  [] Other    Prognosis for POC: [x] Good [] Fair  [] Poor      Patient requires continued skilled intervention: [x] Yes  [] No      Treatment/Activity Tolerance:  [x] Patient tolerated treatment well [] Patient limited by fatique  [] Patient limited by pain  [] Patient limited by other medical complications  [] Other: Pt genie session well with increased tolerance of exercises. She did have increased fatigue throughout the session. Discussed returning to all of the exercises for HEP to improve symptoms and progress as tolerated. 11/21    Patient education: Patient education on PT and plan of care including diagnosis, prognosis, treatment goals and options. Patient agrees with discussed POC and treatment and is aware of rehab process. 10/17      PLAN: 1x/ week for 4 weeks (11/21/22 - 12/19/22)   [x] Continue per plan of care [] Alter current plan (see comments above)  [] Plan of care initiated [] Hold pending MD visit [] Discharge      Electronically signed by:  Tashi Davison, PT, DPT    Note: If patient does not return for scheduled/ recommended follow up visits, this note will serve as a discharge from care along with most recent update on progress.

## 2022-11-22 ENCOUNTER — APPOINTMENT (OUTPATIENT)
Dept: PHYSICAL THERAPY | Age: 52
End: 2022-11-22
Payer: COMMERCIAL

## 2022-11-30 ENCOUNTER — HOSPITAL ENCOUNTER (OUTPATIENT)
Dept: PHYSICAL THERAPY | Age: 52
Setting detail: THERAPIES SERIES
Discharge: HOME | End: 2022-11-30
Payer: COMMERCIAL

## 2022-11-30 PROCEDURE — 97112 NEUROMUSCULAR REEDUCATION: CPT | Performed by: PHYSICAL THERAPIST

## 2022-11-30 PROCEDURE — 97530 THERAPEUTIC ACTIVITIES: CPT | Performed by: PHYSICAL THERAPIST

## 2022-11-30 PROCEDURE — 97110 THERAPEUTIC EXERCISES: CPT | Performed by: PHYSICAL THERAPIST

## 2022-11-30 NOTE — FLOWSHEET NOTE
[facility], [address] [place], [address]  Phone: [phone]   Fax: [phone]      Physical Therapy Treatment Note/ Progress Report:     Date:  2022    Patient Name:  Yane Veras    :  1970  MRN: [id number]  Restrictions/Precautions:    Medical/Treatment Diagnosis Information:  Diagnosis: M75.42 (ICD-10-CM) - Impingement syndrome of left shoulder  Treatment Diagnosis: decreased ROM, strength, posture, endurance, and high-level iADLs  Insurance/Certification information:  PT Insurance Information: [place]  Physician Information:  Dr. Jose Miguel Dye  Has the plan of care been signed (Y/N):        []  Yes  [x]  No     Date of Patient follow up with Physician:       Is this a Progress Report:     []  Yes  [x]  No        Progress report/ Recertification will be due (10 Rx or 30 days whichever is less): 7 Dec 2022      Visit # Insurance Allowable Auth Required   8 30 []  Yes []  No        Functional Scale:  FOTO Shoulder  10/17 - 55/ - /        Latex Allergy:  [x]NO      []YES  Preferred Language for Healthcare:   [x]English       []other:      Pain level:  1 /10 11/30     SUBJECTIVE:  Pt states she reached across herself this morning and did have a little pull, but it went right away. She states no other pain this past week. She states HEP is going well.       OBJECTIVE:   Observation:   Test measurements:            ROM AROM  Comment     L R     Flexion 176 174     Abduction 130 148     ER 80   93     IR 78 60     Other(cervical)         Other                Strength L R Comment   Flexion 4/5 4/5     Abduction 4/5 + pain  4/5     ER 4/5 4/5     IR 4/5 4/5                         Lower Trap  4-/5       Mid Trap 4/5 4-/5     Rhomboids 4/5 4/5     Biceps 4+/5 4/5     Triceps 4+/5 4/5        Special Tests Results/Comment   Javier Pulido     Speeds     OBriens     Other Reflexes/Sensation:               [x]Dermatomes/Myotomes intact               [x]Reflexes equal and normal bilaterally              []Other:     Joint mobility:               [x]Normal                       []Hypo              []Hyper     Palpation:     Functional Mobility/Transfers: decreased WB through LUE     Posture: rounded shoulders      Gait: (include devices/WB status): WNL         RESTRICTIONS/PRECAUTIONS:     Exercises/Interventions:   Exercise/Equipment Resistance/Repetitions Other comments   Arm bike 5'  Added 11/21   Stretching/PROM     Wand     Table Slides     ER cane - supine Pulleys 10 sec x 10 ABD Added 10/17   Thoracic ext over foam roller           Isometrics     Retraction      Weight shift     Flexion     Abduction 10 sec x 10  Added 10/17   External Rotation 10 sec x 10  Added 10/17   Internal Rotation Added 10/19   Biceps     Triceps          PRE's     Flexion     Abduction     External Rotation Internal Rotation Shrugs     EXT     Reverse Flys     Serratus ^11/2   Horizontal Abd with ER    Biceps 10 x 3 5# ECL  ^11/30   Triceps - kickback 10 x 3  Added 11/30   Retraction          Cable Column/Theraband     External Rotation     Internal Rotation     Shrugs     Lats 10 x 3 black TB Added 11/2   Ext 10 x 3 blue TB  ^11/21   Flex     Scapular Retraction 10 x 3 blue TB  Added 10/26   BIC     TRIC     PNF     Prone T 10 x 3 Added 11/2   Prone Y 10 x 3  Added 11/9        Dynamic Stability     Supine ABCs Ball on the wall 10 x 3 each 2-way, kickball Added 11/2        [place]          Manual interventions     L shoulder stretching - flex, ABD, IR, and ER with STM to anterior shoulder            Therapeutic Exercise and NMR EXR  [x] (87403) Provided verbal/tactile cueing for activities related to strengthening, flexibility, endurance, ROM  for improvements in scapular, scapulothoracic and UE control with self care, reaching, carrying, lifting, house/yardwork, driving/computer work.     [x] (79670) Provided verbal/tactile cueing for activities related to improving balance, coordination, kinesthetic sense, posture, motor skill, proprioception  to assist with  scapular, scapulothoracic and UE control with self care, reaching, carrying, lifting, house/yardwork, driving/computer work. Therapeutic Activities:    [x] (25872 or 21171) Provided verbal/tactile cueing for activities related to improving balance, coordination, kinesthetic sense, posture, motor skill, proprioception and motor activation to allow for proper function of scapular, scapulothoracic and UE control with self care, carrying, lifting, driving/computer work. Home Exercise Program:    [x] (32883) Reviewed/Progressed HEP activities related to strengthening, flexibility, endurance, ROM of scapular, scapulothoracic and UE control with self care, reaching, carrying, lifting, house/yardwork, driving/computer work  [] (72722) Reviewed/Progressed HEP activities related to improving balance, coordination, kinesthetic sense, posture, motor skill, proprioception of scapular, scapulothoracic and UE control with self care, reaching, carrying, lifting, house/yardwork, driving/computer work      Access Code [id number]    Manual Treatments:  PROM / STM / Oscillations-Mobs:  G-I, II, III, IV (PA's, Inf., Post.)  [] (75646) Provided manual therapy to mobilize soft tissue/joints of cervical/CT, scapular GHJ and UE for the purpose of modulating pain, promoting relaxation,  increasing ROM, reducing/eliminating soft tissue swelling/inflammation/restriction, improving soft tissue extensibility and allowing for proper ROM for normal function with self care, reaching, carrying, lifting, house/yardwork, driving/computer work    Modalities:  CP - 15'  11/30   [] GAME READY (VASO)- for significant edema, swelling, pain control.      Charges:  Timed Code Treatment Minutes: 40'    Total Treatment Minutes: 62'       [] EVAL (LOW) 455 1011 (typically 20 minutes face-to-face)  [] EVAL (MOD) 46539 (typically 30 minutes face-to-face)  [] EVAL (HIGH) 90636 (typically 45 minutes face-to-face)  [] RE-EVAL     [x] LW(13376) x  1  [] IONTO  [x] NMR (18094) x 1     [] VASO  [] Manual (30017) x    [] Other:  [x] TA x  1    [] Mech Traction (96781)  [] ES(attended) (19870)      [] ES (un) (10740):         ASSESSMENT:        GOALS: 11/9  Patient stated goal: reach with little to no pain, light weight lifting     [] Progressing: [] Met: [x] Not Met: [] Adjusted    Therapist goals for Patient:   Short Term Goals: To be achieved in: 2 weeks  1. Independent in HEP and progression per patient tolerance, in order to prevent re-injury. [] Progressing: [x] Met: [] Not Met: [] Adjusted   2. Patient will have a decrease in pain to facilitate improvement in movement, function, and ADLs as indicated by Functional Deficits. [] Progressing: [x] Met: [] Not Met: [] Adjusted    Long Term Goals: To be achieved in: 4-6 weeks  1. Patient will score greater than or equal to 73/100 on FOTO shoulder to assist with reaching prior level of function. [] Progressing: [] Met: [x] Not Met: [] Adjusted  2. Patient will demonstrate increased L shoulder flex AROM to greater than or equal to 170 deg, ABD AROM to greater than or equal to 145 deg, and ER AROM to greater than or equal to 90 deg to allow for proper joint functioning as indicated by patients Functional Deficits. [x] Progressing: [] Met: [] Not Met: [] Adjusted  3. Patient will demonstrate an increase in L shoulder (flex, ABD, IR, and ER) strength to 4+/5  to allow for proper functional mobility as indicated by patients Functional Deficits. [] Progressing: [] Met: [x] Not Met: [] Adjusted  4. Patient will return to ADLs without increased symptoms or restriction. [] Progressing: [] Met: [x] Not Met: [] Adjusted  5. Patient will return to independent workout pain free.    [] Progressing: [] Met: [x] Not Met: [] Adjusted       Overall Progression Towards Functional goals/ Treatment Progress Update:  [x] Patient is progressing as expected towards functional goals listed. [] Progression is slowed due to complexities/Impairments listed. [] Progression has been slowed due to co-morbidities. [] Plan just implemented, too soon to assess goals progression <30days   [] Goals require adjustment due to lack of progress  [] Patient is not progressing as expected and requires additional follow up with physician  [] Other    Prognosis for POC: [x] Good [] Fair  [] Poor      Patient requires continued skilled intervention: [x] Yes  [] No      Treatment/Activity Tolerance:  [x] Patient tolerated treatment well [] Patient limited by fatique  [] Patient limited by pain  [] Patient limited by other medical complications  [] Other: Pt genie session well. She required VCS to improve scapular depression with prone Ys. She reported fatigue with the addition of triceps kickbacks. Pt will follow-up with PT after MD appt next week. 11/30    Patient education: Patient education on PT and plan of care including diagnosis, prognosis, treatment goals and options. Patient agrees with discussed POC and treatment and is aware of rehab process. 10/17      PLAN: 1x/ week for 4 weeks (11/21/22 - 12/19/22)   [x] Continue per plan of care [] Alter current plan (see comments above)  [] Plan of care initiated [] Hold pending MD visit [] Discharge      Electronically signed by:  Renzo Diaz, PT, DPT    Note: If patient does not return for scheduled/ recommended follow up visits, this note will serve as a discharge from care along with most recent update on progress.

## 2022-12-07 ENCOUNTER — OFFICE VISIT (OUTPATIENT)
Dept: ORTHOPEDIC SURGERY | Age: 52
End: 2022-12-07
Payer: COMMERCIAL

## 2022-12-07 DIAGNOSIS — M75.42 IMPINGEMENT SYNDROME OF LEFT SHOULDER: Primary | ICD-10-CM

## 2022-12-07 PROCEDURE — 99213 OFFICE O/P EST LOW 20 MIN: CPT | Performed by: STUDENT IN AN ORGANIZED HEALTH CARE EDUCATION/TRAINING PROGRAM

## 2022-12-07 NOTE — PROGRESS NOTES
Chief Complaint   Patient presents with    Shoulder Pain         HPI:      Mckenzie Stoner is a 45 y.o. female who presents for follow-up evaluation of left shoulder pain. She has been having symptoms consistent with rotator cuff tendonitis and impingement syndrome. She has been going to physical therapy for around 2 months now and at our last visit 3 weeks ago we did perform a subacromial injection. She reports that her symptoms have improved since the injection. She reports the pain is around a 1/10 now and is only present with abduction. Past Medical History:   Diagnosis Date    HIGH CHOLESTEROL     Hyperlipidemia     Hypertension     Vitamin D deficiency        Medication  Current Outpatient Medications   Medication Sig Dispense Refill    lisinopril (PRINIVIL;ZESTRIL) 10 MG tablet TAKE 1 TABLET BY MOUTH DAILY 30 tablet 11    meloxicam (MOBIC) 15 MG tablet TAKE 1 TABLET BY MOUTH DAILY AS NEEDED FOR PAIN 30 tablet 5    vitamin D 25 MCG (1000 UT) CAPS Take 4 capsules by mouth daily Stop vitamin D2 120 capsule 12    mometasone (ELOCON) 0.1 % cream Apply topically daily to rash prn 15 g 1    triamcinolone (KENALOG) 0.1 % cream Apply topically 2 times daily. 30 g 2     No current facility-administered medications for this visit. Allergies  Allergies   Allergen Reactions    Tetanus Toxoids Other (See Comments)     Hot, swollen, tender       Review of Systems:  Pertinent items are noted in HPI. Physical Examination: This is a pleasant female, alert, and in no acute distress. There were no vitals filed for this visit. Left shoulder exam: Painful arc, full range of motion, 5/5 strength in all planes. Mildly positive empty can. Vascular exam: Extremities warm and well perfused, no significant edema    Respiratory exam: Breathing easy and unlabored    Neuro exam: No focal neuro deficits    Lymphatic: No obvious lymphadenopathy    Skin: Warm, dry    Assessment and Plan:     1.  Impingement syndrome of left shoulder  Her left rotator cuff tendinitis and impingement syndrome appears to be feeling much better for her at this point time. The injection did seem to help her not let her know that this can be repeated as frequently as every 3 months if needed, but ideally she would not need another injection. I recommended continuing with physical therapy either with a formal physical therapist or at home depending on what the physical therapist feels to be most beneficial.  She does clearly still have some symptoms although they are mild, but I do want her to keep doing her exercises so we can hopefully completely resolve her symptoms. Follow-up: as needed. Sooner with any problems, questions, concerns, or worsening symptoms. Dana Barba MD  [facility]    Please note that this chart was at least partially generated using dragon dictation software. Although every effort was made to ensure the accuracy of this automated transcription, some errors in transcription may have occurred.

## 2022-12-14 ENCOUNTER — APPOINTMENT (OUTPATIENT)
Dept: PHYSICAL THERAPY | Age: 52
End: 2022-12-14
Payer: COMMERCIAL

## 2022-12-14 ENCOUNTER — HOSPITAL ENCOUNTER (OUTPATIENT)
Dept: PHYSICAL THERAPY | Age: 52
Setting detail: THERAPIES SERIES
Discharge: HOME | End: 2022-12-14
Payer: COMMERCIAL

## 2022-12-14 PROCEDURE — 97110 THERAPEUTIC EXERCISES: CPT | Performed by: PHYSICAL THERAPIST

## 2022-12-14 PROCEDURE — 97530 THERAPEUTIC ACTIVITIES: CPT | Performed by: PHYSICAL THERAPIST

## 2022-12-14 PROCEDURE — 97112 NEUROMUSCULAR REEDUCATION: CPT | Performed by: PHYSICAL THERAPIST

## 2022-12-14 NOTE — PLAN OF CARE
[address] 75, [address] [place], [address]  Phone: [phone]   Fax: [phone]       Physical Therapy Re-Certification Plan of Care    Dear Dr. Annika Ashley,    We had the pleasure of treating the following patient for physical therapy services at [address] [place] [address]. A summary of our findings can be found in the updated assessment below. This includes our plan of care. If you have any questions or concerns regarding these findings, please do not hesitate to contact me at the office phone number checked above. Thank you for the referral.     Physician Signature:________________________________Date:__________________  By signing above (or electronic signature), therapists plan is approved by physician      Overall Response to Treatment:   [x]Patient is responding well to treatment and improvement is noted with regards  to goals   []Patient should continue to improve in reasonable time if they continue HEP   []Patient has plateaued and is no longer responding to skilled PT intervention    []Patient is getting worse and would benefit from return to referring MD   []Patient unable to adhere to initial POC   [x]Other: Pt presents with continued mild symptoms. She has intermittent pain, specifically with shoulder ABD. She demonstrates functional shoulder AROM except shoulder ABD. She maintains scapular and shoulder strength. Recommend pt continue with 1x/ every other week to progress strength training and see if shoulder ABD pain resolves to return to PLOF.      Date range of previous POC Visits: 22 - 22    Total Visits: 9          Physical Therapy Treatment Note/ Progress Report:     Date:  2022    Patient Name:  Taisha Sanchez    :  1970  MRN: [id number]  Restrictions/Precautions:    Medical/Treatment Diagnosis Information:  Diagnosis: M75.42 (ICD-10-CM) - Impingement syndrome of left shoulder  Treatment Diagnosis: decreased ROM, strength, posture, endurance, and high-level iADLs  Insurance/Certification information:  PT Insurance Information: [employer]  Physician Information:  Dr. Cody Cortez  Has the plan of care been signed (Y/N):        []  Yes  [x]  No     Date of Patient follow up with Physician:       Is this a Progress Report:     [x]  Yes  []  No        Progress report/ Recertification will be due (10 Rx or 30 days whichever is less): 11 Jan 2023      Visit # Insurance Allowable Auth Required   9 30 []  Yes []  No        Functional Scale:  FOTO Shoulder  10/17 - 55/100 11/9 - 59/100        Latex Allergy:  [x]NO      []YES  Preferred Language for Healthcare:   [x]English       []other:      Pain level:  0 /10 at rest, 5-7/88 with certain activities  11/62     SUBJECTIVE:  Pt states she is about the same. 0/10 pain at rest. A little pain with any activity that involved reaching out to the side. Has been doing her HEP.  She saw the MD last week, who released her to PT.  12/14    OBJECTIVE: 12/14  Observation:   Test measurements:      ROM AROM  Comment     L R     Flexion 182 174     Abduction 129 148     ER 80   93     IR 82 60     Other(cervical)         Other                Strength L R Comment   Flexion 4/5 4/5     Abduction 4-/5 + pain  4/5     ER 4/5 4/5     IR 4/5 4/5                         Lower Trap  4-/5       Mid Trap 4/5 4-/5     Rhomboids 4/5 4/5     Biceps 4+/5 4/5     Triceps 4+/5 4/5        Special Tests Results/Comment   Javier     Nearoldo     Speeds     OBriens     Other        Reflexes/Sensation:               [x]Dermatomes/Myotomes intact               [x]Reflexes equal and normal bilaterally              []Other:     Joint mobility:               [x]Normal                       []Hypo              []Hyper     Palpation:     Functional Mobility/Transfers: decreased WB through LUE     Posture: rounded shoulders      Gait: (include devices/WB status): WNL         RESTRICTIONS/PRECAUTIONS:     Exercises/Interventions:   Exercise/Equipment Resistance/Repetitions Other comments   Arm bike 5'  Added 11/21   Stretching/PROM     Wand     Table Slides     ER cane - supine Pulleys 10 sec x 10 ABD Added 10/17   Thoracic ext over foam roller           Isometrics     Retraction      Weight shift     Flexion     Abduction Added 10/17   External Rotation Added 10/17   Internal Rotation Added 10/19   Biceps     Triceps          PRE's     Flexion     Abduction     External Rotation 10 x 3 red TB Added 10/24   Internal Rotation 10 x 3 blue TB  Added 10/24   Shrugs     EXT     Reverse Flys     Serratus ^11/2   Horizontal Abd with ER    Biceps 10 x 3 5# ECL  ^11/30   Triceps - kickback 10 x 3 5# Added 11/30   Retraction          Cable Column/Theraband     External Rotation     Internal Rotation     Shrugs     Lats 10 x 3 black TB Added 11/2   Ext 10 x 3 black TB  ^11/21   Flex     Scapular Retraction 10 x 3 black TB  Added 10/26   BIC     TRIC     PNF- D2 10 x 3  Added 12/14   Prone T 10 x 3 Added 11/2   Prone Y 10 x 3  Added 11/9        Dynamic Stability     Supine ABCs Ball on the wall 10 x 3 each 2-way, kickball Added 11/2        Plyoback          Manual interventions     L shoulder stretching - flex, ABD, IR, and ER with STM to anterior shoulder            Therapeutic Exercise and NMR EXR  [x] (77444) Provided verbal/tactile cueing for activities related to strengthening, flexibility, endurance, ROM  for improvements in scapular, scapulothoracic and UE control with self care, reaching, carrying, lifting, house/yardwork, driving/computer work. [x] (19316) Provided verbal/tactile cueing for activities related to improving balance, coordination, kinesthetic sense, posture, motor skill, proprioception  to assist with  scapular, scapulothoracic and UE control with self care, reaching, carrying, lifting, house/yardwork, driving/computer work.     Therapeutic Activities:    [x] (60089 or ) Provided verbal/tactile cueing for activities related to improving balance, coordination, kinesthetic sense, posture, motor skill, proprioception and motor activation to allow for proper function of scapular, scapulothoracic and UE control with self care, carrying, lifting, driving/computer work. Home Exercise Program:    [x] (46764) Reviewed/Progressed HEP activities related to strengthening, flexibility, endurance, ROM of scapular, scapulothoracic and UE control with self care, reaching, carrying, lifting, house/yardwork, driving/computer work  [] (60220) Reviewed/Progressed HEP activities related to improving balance, coordination, kinesthetic sense, posture, motor skill, proprioception of scapular, scapulothoracic and UE control with self care, reaching, carrying, lifting, house/yardwork, driving/computer work      Access Code [id number]    Manual Treatments:  PROM / STM / Oscillations-Mobs:  G-I, II, III, IV (PA's, Inf., Post.)  [] (03050) Provided manual therapy to mobilize soft tissue/joints of cervical/CT, scapular GHJ and UE for the purpose of modulating pain, promoting relaxation,  increasing ROM, reducing/eliminating soft tissue swelling/inflammation/restriction, improving soft tissue extensibility and allowing for proper ROM for normal function with self care, reaching, carrying, lifting, house/yardwork, driving/computer work    Modalities:  CP - 15'  12/14   [] GAME READY (VASO)- for significant edema, swelling, pain control.      Charges:  Timed Code Treatment Minutes: 45'    Total Treatment Minutes: 72'       [] EVAL (LOW) 455 1011 (typically 20 minutes face-to-face)  [] EVAL (MOD) 42213 (typically 30 minutes face-to-face)  [] EVAL (HIGH) 35778 (typically 45 minutes face-to-face)  [] RE-EVAL     [x] YX(61508) x  1  [] IONTO  [x] NMR (79282) x 1     [] VASO  [] Manual (14337) x    [] Other:  [x] TA x  1    [] Mech Traction (95614)  [] ES(attended) (93913)      [] ES (un) (17218): ASSESSMENT:        GOALS: 11/9  Patient stated goal: reach with little to no pain, light weight lifting     [] Progressing: [] Met: [x] Not Met: [] Adjusted    Therapist goals for Patient:   Short Term Goals: To be achieved in: 2 weeks  1. Independent in HEP and progression per patient tolerance, in order to prevent re-injury. [] Progressing: [x] Met: [] Not Met: [] Adjusted   2. Patient will have a decrease in pain to facilitate improvement in movement, function, and ADLs as indicated by Functional Deficits. [] Progressing: [x] Met: [] Not Met: [] Adjusted    Long Term Goals: To be achieved in: 4-6 weeks  1. Patient will score greater than or equal to 73/100 on FOTO shoulder to assist with reaching prior level of function. [] Progressing: [] Met: [x] Not Met: [] Adjusted  2. Patient will demonstrate increased L shoulder flex AROM to greater than or equal to 170 deg, ABD AROM to greater than or equal to 145 deg, and ER AROM to greater than or equal to 90 deg to allow for proper joint functioning as indicated by patients Functional Deficits. [x] Progressing: [] Met: [] Not Met: [] Adjusted  3. Patient will demonstrate an increase in L shoulder (flex, ABD, IR, and ER) strength to 4+/5  to allow for proper functional mobility as indicated by patients Functional Deficits. [] Progressing: [] Met: [x] Not Met: [] Adjusted  4. Patient will return to ADLs without increased symptoms or restriction. [] Progressing: [] Met: [x] Not Met: [] Adjusted  5. Patient will return to independent workout pain free. [] Progressing: [] Met: [x] Not Met: [] Adjusted       Overall Progression Towards Functional goals/ Treatment Progress Update:  [x] Patient is progressing as expected towards functional goals listed. [] Progression is slowed due to complexities/Impairments listed. [] Progression has been slowed due to co-morbidities.   [] Plan just implemented, too soon to assess goals progression <30days   [] Goals require adjustment due to lack of progress  [] Patient is not progressing as expected and requires additional follow up with physician  [] Other    Prognosis for POC: [x] Good [] Fair  [] Poor      Patient requires continued skilled intervention: [x] Yes  [] No      Treatment/Activity Tolerance:  [x] Patient tolerated treatment well [] Patient limited by fatique  [] Patient limited by pain  [] Patient limited by other medical complications  [] Other: Pt presents with continued mild symptoms. She has intermittent pain, specifically with shoulder ABD. She demonstrates functional shoulder AROM except shoulder ABD. She maintains scapular and shoulder strength. Recommend pt continue with 1x/ every other week to progress strength training and see if shoulder ABD pain resolves to return to PLOF.   12/14    Patient education: Patient education on PT and plan of care including diagnosis, prognosis, treatment goals and options. Patient agrees with discussed POC and treatment and is aware of rehab process. 10/17      PLAN: 1x/ every other week for 4 weeks (12/14/22 - 1/11/23)   [x] Continue per plan of care [] Alter current plan (see comments above)  [] Plan of care initiated [] Hold pending MD visit [] Discharge      Electronically signed by:  Sheryl Khan PT, DPT    Note: If patient does not return for scheduled/ recommended follow up visits, this note will serve as a discharge from care along with most recent update on progress.

## 2022-12-21 ENCOUNTER — OFFICE VISIT (OUTPATIENT)
Dept: ORTHOPEDIC SURGERY | Age: 52
End: 2022-12-21
Payer: COMMERCIAL

## 2022-12-21 DIAGNOSIS — M25.561 RIGHT KNEE PAIN, UNSPECIFIED CHRONICITY: Primary | ICD-10-CM

## 2022-12-21 PROCEDURE — 99213 OFFICE O/P EST LOW 20 MIN: CPT | Performed by: STUDENT IN AN ORGANIZED HEALTH CARE EDUCATION/TRAINING PROGRAM

## 2022-12-21 SDOH — HEALTH STABILITY: PHYSICAL HEALTH: ON AVERAGE, HOW MANY MINUTES DO YOU ENGAGE IN EXERCISE AT THIS LEVEL?: 20 MIN

## 2022-12-21 SDOH — HEALTH STABILITY: PHYSICAL HEALTH: ON AVERAGE, HOW MANY DAYS PER WEEK DO YOU ENGAGE IN MODERATE TO STRENUOUS EXERCISE (LIKE A BRISK WALK)?: 2 DAYS

## 2022-12-21 ASSESSMENT — SOCIAL DETERMINANTS OF HEALTH (SDOH)
WITHIN THE LAST YEAR, HAVE YOU BEEN HUMILIATED OR EMOTIONALLY ABUSED IN OTHER WAYS BY YOUR PARTNER OR EX-PARTNER?: NO
WITHIN THE LAST YEAR, HAVE YOU BEEN KICKED, HIT, SLAPPED, OR OTHERWISE PHYSICALLY HURT BY YOUR PARTNER OR EX-PARTNER?: NO
WITHIN THE LAST YEAR, HAVE YOU BEEN AFRAID OF YOUR PARTNER OR EX-PARTNER?: NO
WITHIN THE LAST YEAR, HAVE TO BEEN RAPED OR FORCED TO HAVE ANY KIND OF SEXUAL ACTIVITY BY YOUR PARTNER OR EX-PARTNER?: NO

## 2022-12-21 NOTE — PROGRESS NOTES
Chief Complaint   Patient presents with    Knee Pain     Right knee pain. HPI:      Rohit Gonzalez is a 45 y.o. female who presents for evaluation of right knee pain. She developed right knee pain approximately 1 week ago the day after riding a stationary bike at home. She reports that she felt good the day of the workout, but it was the next day that she started to have issues. She has been having pain with both walking up and down stairs. She indicates the pain is over the anterolateral aspect of the knee. She has not had problems here previously. She is taking Mobic daily and that she did take some ibuprofen in addition to the Mobic which helped somewhat. Past Medical History:   Diagnosis Date    HIGH CHOLESTEROL     Hyperlipidemia     Hypertension     Vitamin D deficiency        Medications and allergies were reviewed as necessary. Review of Systems:  Pertinent items are noted in HPI. Physical Examination: This is a pleasant female, alert, and in no acute distress. There were no vitals filed for this visit. Right knee exam: Tenderness to palpation over the VMO insertion on the patella and on the superior lateral aspect of the patella. There is no joint line tenderness. She has pain with active knee extension and hip flexion. Pain is only mildly reproduced with resisted knee extension, but is reproduced with resisted hip flexion with both straight and flexed knee. Negative for lag and strength is intact. Negative Tonja, no varus or valgus laxity. Vascular exam: Extremities warm and well perfused, no significant edema    Respiratory exam: Breathing easy and unlabored    Neuro exam: No focal neuro deficits    Lymphatic: No obvious lymphadenopathy    Skin: Warm, dry    Radiology:     4 view X-rays of the right knee dated 12/21/2022 were reviewed and interpreted independently today and discussed with the patient. The films revealed:  Moderate medial lateral compartment osteoarthritis, severe anterior compartment osteoarthritis with significant spurring predominantly on the lateral aspect of the patella. Lateral patella does contact the patellar groove. Assessment and Plan:     1. Right knee pain, unspecified chronicity  Her symptoms are most consistent with anterior compartment osteoarthritis. She does have a lot of bone spurs in the area where she is indicating pain. However, she does have some symptoms consistent with quadriceps tendinitis. We discussed her potential diagnoses and treatment options including ice, compression, anti-inflammatories, and exercises which I showed her in clinic today. We did discuss the possibility of an injection, but she would like to try these other options first.  - XR KNEE RIGHT (MIN 4 VIEWS)    Follow-up: As needed. Sooner with any problems, questions, concerns, or worsening symptoms. Vania Lee MD  [facility]    Please note that this chart was at least partially generated using dragon dictation software. Although every effort was made to ensure the accuracy of this automated transcription, some errors in transcription may have occurred.

## 2022-12-28 ENCOUNTER — HOSPITAL ENCOUNTER (OUTPATIENT)
Dept: PHYSICAL THERAPY | Age: 52
Setting detail: THERAPIES SERIES
Discharge: HOME | End: 2022-12-28
Payer: COMMERCIAL

## 2022-12-28 PROCEDURE — 97530 THERAPEUTIC ACTIVITIES: CPT | Performed by: PHYSICAL THERAPIST

## 2022-12-28 PROCEDURE — 97110 THERAPEUTIC EXERCISES: CPT | Performed by: PHYSICAL THERAPIST

## 2022-12-28 PROCEDURE — 97112 NEUROMUSCULAR REEDUCATION: CPT | Performed by: PHYSICAL THERAPIST

## 2022-12-28 NOTE — PLAN OF CARE
[address] [place] [address] [facility], [address] [place], [address]  Phone: [phone]   Fax: [phone]      Physical Therapy Treatment Note/ Progress Report:     Date:  2022    Patient Name:  Anaid Abraham    :  1970  MRN: [id number]  Restrictions/Precautions:    Medical/Treatment Diagnosis Information:  Diagnosis: M75.42 (ICD-10-CM) - Impingement syndrome of left shoulder  Treatment Diagnosis: decreased ROM, strength, posture, endurance, and high-level iADLs  Insurance/Certification information:  PT Insurance Information: [employer]  Physician Information:  Dr. Shelby Loera  Has the plan of care been signed (Y/N):        []  Yes  [x]  No     Date of Patient follow up with Physician:       Is this a Progress Report:     [x]  Yes  []  No        Progress report/ Recertification will be due (10 Rx or 30 days whichever is less): 2023      Visit # Insurance Allowable Auth Required   10 30 []  Yes []  No        Functional Scale:  FOTO Shoulder  10/17 - 55/100    - 59/        Latex Allergy:  [x]NO      []YES  Preferred Language for Healthcare:   [x]English       []other:      Pain level:  0 /10 at rest, 5-5/65 with certain activities       SUBJECTIVE:  Pt states her shoulder is feeling a little better. She states first thing in the morning, she has increased stiffness until she stretches. She states she feels better with doing HEP. She states reaching out to the side and picking up continues to bother her. She states it is more of a nuisance.        OBJECTIVE:   Observation:   Test measurements:      ROM AROM  Comment     L R     Flexion 182 174     Abduction 129 148     ER 80   93     IR 82 60     Other(cervical)         Other                Strength L R Comment   Flexion 4/5 4/5     Abduction 4-/5 + pain  4/5     ER 4/5 4/5     IR 4/5 4/5                         Lower Trap  4-/5       Mid Trap 4/5 4-/5     Rhomboids 4/5 4/5     Biceps 4+/5 4/5     Triceps 4+/5 4/5        Special Tests Results/Comment   Javier     Neers     Speeds     OBriens     Other        Reflexes/Sensation:               [x]Dermatomes/Myotomes intact               [x]Reflexes equal and normal bilaterally              []Other:     Joint mobility:               [x]Normal                       []Hypo              []Hyper     Palpation:     Functional Mobility/Transfers: decreased WB through LUE     Posture: rounded shoulders      Gait: (include devices/WB status):  WNL         RESTRICTIONS/PRECAUTIONS:     Exercises/Interventions:   Exercise/Equipment Resistance/Repetitions Other comments   Arm bike 5'  Added 11/21   Stretching/PROM     Wand     Table Slides     ER cane - supine Pulleys 10 sec x 10 ABD Added 10/17   Thoracic ext over foam roller           Isometrics     Retraction      Weight shift     Flexion     Abduction Added 10/17   External Rotation Added 10/17   Internal Rotation Added 10/19   Biceps     Triceps          PRE's     Flexion     Abduction     External Rotation 10 x 3 red TB Added 10/24   Internal Rotation 10 x 3 blue TB  Added 10/24   Shrugs     EXT     Reverse Flys     Serratus ^11/2   Horizontal Abd with ER    Biceps 10 x 3 5# ECL  ^11/30   Triceps - kickback 10 x 3 5# Added 11/30   Retraction          Cable Column/Theraband     External Rotation     Internal Rotation     Shrugs     Lats 10 x 3 black TB Added 11/2   Ext 10 x 3 black TB  ^11/21   Flex     Scapular Retraction 10 x 3 black TB  Added 10/26   BIC     TRIC     PNF- D2 10 x 3  1# ^12/28   Prone T 10 x 3 Added 11/2   Prone Y 10 x 3  Added 11/9        Dynamic Stability     Supine ABCs Ball on the wall 10 x 3 each 2-way, kickball Added 11/2   Wall push-ups 10 x 3  Added 12/28   Body blade - shoulder ABD 10 sec x 10  Added 12/28        Plyoback          Manual interventions     L shoulder stretching - flex, ABD, IR, and ER with STM to anterior shoulder Therapeutic Exercise and NMR EXR  [x] (10028) Provided verbal/tactile cueing for activities related to strengthening, flexibility, endurance, ROM  for improvements in scapular, scapulothoracic and UE control with self care, reaching, carrying, lifting, house/yardwork, driving/computer work. [x] (45783) Provided verbal/tactile cueing for activities related to improving balance, coordination, kinesthetic sense, posture, motor skill, proprioception  to assist with  scapular, scapulothoracic and UE control with self care, reaching, carrying, lifting, house/yardwork, driving/computer work. Therapeutic Activities:    [x] (16168 or 03842) Provided verbal/tactile cueing for activities related to improving balance, coordination, kinesthetic sense, posture, motor skill, proprioception and motor activation to allow for proper function of scapular, scapulothoracic and UE control with self care, carrying, lifting, driving/computer work.      Home Exercise Program:    [x] (80436) Reviewed/Progressed HEP activities related to strengthening, flexibility, endurance, ROM of scapular, scapulothoracic and UE control with self care, reaching, carrying, lifting, house/yardwork, driving/computer work  [] (24718) Reviewed/Progressed HEP activities related to improving balance, coordination, kinesthetic sense, posture, motor skill, proprioception of scapular, scapulothoracic and UE control with self care, reaching, carrying, lifting, house/yardwork, driving/computer work      Access Code [email or web address]  Updated 12/28    Manual Treatments:  PROM / STM / Oscillations-Mobs:  G-I, II, III, IV (PA's, Inf., Post.)  [] (92742) Provided manual therapy to mobilize soft tissue/joints of cervical/CT, scapular GHJ and UE for the purpose of modulating pain, promoting relaxation,  increasing ROM, reducing/eliminating soft tissue swelling/inflammation/restriction, improving soft tissue extensibility and allowing for proper ROM for normal function with self care, reaching, carrying, lifting, house/yardwork, driving/computer work    Modalities:  CP - 15'  12/28   [] GAME READY (VASO)- for significant edema, swelling, pain control. Charges:  Timed Code Treatment Minutes: 40'    Total Treatment Minutes: 64'      [] EVAL (LOW) 455 1011 (typically 20 minutes face-to-face)  [] EVAL (MOD) 28433 (typically 30 minutes face-to-face)  [] EVAL (HIGH) 72294 (typically 45 minutes face-to-face)  [] RE-EVAL     [x] SL(56509) x  1  [] IONTO  [x] NMR (42856) x 1     [] VASO  [] Manual (42373) x    [] Other:  [x] TA x  1    [] Mech Traction (90189)  [] ES(attended) (65142)      [] ES (un) (04517):         ASSESSMENT:        GOALS: 11/9  Patient stated goal: reach with little to no pain, light weight lifting     [] Progressing: [] Met: [x] Not Met: [] Adjusted    Therapist goals for Patient:   Short Term Goals: To be achieved in: 2 weeks  1. Independent in HEP and progression per patient tolerance, in order to prevent re-injury. [] Progressing: [x] Met: [] Not Met: [] Adjusted   2. Patient will have a decrease in pain to facilitate improvement in movement, function, and ADLs as indicated by Functional Deficits. [] Progressing: [x] Met: [] Not Met: [] Adjusted    Long Term Goals: To be achieved in: 4-6 weeks  1. Patient will score greater than or equal to 73/100 on FOTO shoulder to assist with reaching prior level of function. [] Progressing: [] Met: [x] Not Met: [] Adjusted  2. Patient will demonstrate increased L shoulder flex AROM to greater than or equal to 170 deg, ABD AROM to greater than or equal to 145 deg, and ER AROM to greater than or equal to 90 deg to allow for proper joint functioning as indicated by patients Functional Deficits. [x] Progressing: [] Met: [] Not Met: [] Adjusted  3.  Patient will demonstrate an increase in L shoulder (flex, ABD, IR, and ER) strength to 4+/5  to allow for proper functional mobility as indicated by patients Functional Deficits. [] Progressing: [] Met: [x] Not Met: [] Adjusted  4. Patient will return to ADLs without increased symptoms or restriction. [] Progressing: [] Met: [x] Not Met: [] Adjusted  5. Patient will return to independent workout pain free. [] Progressing: [] Met: [x] Not Met: [] Adjusted       Overall Progression Towards Functional goals/ Treatment Progress Update:  [x] Patient is progressing as expected towards functional goals listed. [] Progression is slowed due to complexities/Impairments listed. [] Progression has been slowed due to co-morbidities. [] Plan just implemented, too soon to assess goals progression <30days   [] Goals require adjustment due to lack of progress  [] Patient is not progressing as expected and requires additional follow up with physician  [] Other    Prognosis for POC: [x] Good [] Fair  [] Poor      Patient requires continued skilled intervention: [x] Yes  [] No      Treatment/Activity Tolerance:  [x] Patient tolerated treatment well [] Patient limited by fatique  [] Patient limited by pain  [] Patient limited by other medical complications  [] Other: Pt genie session well. She was fatigued with current POC. She did genie the addition of WB through the UEs, reporting min popping that was reduced when hand placement was slightly changed. She genie the addition of body blade in shoulder ABD, reporting increased muscular fatigue. Discussed following up with MD if pt continues to have pain. Pt will follow-up in 2 weeks for re-assessment. 12/28    Patient education: Patient education on PT and plan of care including diagnosis, prognosis, treatment goals and options. Patient agrees with discussed POC and treatment and is aware of rehab process.  10/17      PLAN: 1x/ every other week for 4 weeks (12/14/22 - 1/11/23)   [x] Continue per plan of care [] Alter current plan (see comments above)  [] Plan of care initiated [] Hold pending MD visit [] Discharge      Electronically signed by: Marta Ya, PT, DPT    Note: If patient does not return for scheduled/ recommended follow up visits, this note will serve as a discharge from care along with most recent update on progress.

## 2023-01-11 ENCOUNTER — OFFICE VISIT (OUTPATIENT)
Dept: ORTHOPEDIC SURGERY | Age: 53
End: 2023-01-11
Payer: COMMERCIAL

## 2023-01-11 ENCOUNTER — HOSPITAL ENCOUNTER (OUTPATIENT)
Dept: PHYSICAL THERAPY | Age: 53
Setting detail: THERAPIES SERIES
Discharge: HOME | End: 2023-01-11
Payer: COMMERCIAL

## 2023-01-11 DIAGNOSIS — G89.29 CHRONIC LEFT SHOULDER PAIN: Primary | ICD-10-CM

## 2023-01-11 DIAGNOSIS — M25.512 CHRONIC LEFT SHOULDER PAIN: Primary | ICD-10-CM

## 2023-01-11 PROCEDURE — 99213 OFFICE O/P EST LOW 20 MIN: CPT | Performed by: STUDENT IN AN ORGANIZED HEALTH CARE EDUCATION/TRAINING PROGRAM

## 2023-01-11 PROCEDURE — 97530 THERAPEUTIC ACTIVITIES: CPT | Performed by: PHYSICAL THERAPIST

## 2023-01-11 PROCEDURE — 97112 NEUROMUSCULAR REEDUCATION: CPT | Performed by: PHYSICAL THERAPIST

## 2023-01-11 PROCEDURE — 97110 THERAPEUTIC EXERCISES: CPT | Performed by: PHYSICAL THERAPIST

## 2023-01-11 NOTE — PLAN OF CARE
[facility],[address], [address] [place], [address]  Phone: [phone]   Fax: [phone]       Physical Therapy Re-Certification Plan of Care    Dear Dr. Savannah De Oliveira,    We had the pleasure of treating the following patient for physical therapy services at [address] [place] [address]. A summary of our findings can be found in the updated assessment below. This includes our plan of care. If you have any questions or concerns regarding these findings, please do not hesitate to contact me at the office phone number checked above. Thank you for the referral.     Physician Signature:________________________________Date:__________________  By signing above (or electronic signature), therapists plan is approved by physician      Overall Response to Treatment:   []Patient is responding well to treatment and improvement is noted with regards  to goals   []Patient should continue to improve in reasonable time if they continue HEP   []Patient has plateaued and is no longer responding to skilled PT intervention    []Patient is getting worse and would benefit from return to referring MD   []Patient unable to adhere to initial POC   []Other: Patient with fair activity tolerance. An insidious increase in pain has resulted in decreased ROM and strength when compared to previous progress report. Patient will report back to doctor immediately following this visit.  Patient will follow up with PT after following up with MRI results with MD.     Date range of previous POC Visits: 22 - 23    Total Visits: 11      Physical Therapy Treatment Note/ Progress Report:     Date:  2023    Patient Name:  Maggie Lacy    :  1970  MRN: [id number]  Restrictions/Precautions:    Medical/Treatment Diagnosis Information:  Diagnosis: M75.42 (ICD-10-CM) - Impingement syndrome of left shoulder  Treatment Diagnosis: decreased ROM, strength, posture, endurance, and high-level iADLs  Insurance/Certification information:  PT Insurance Information: Diana  Physician Information:  Dr. Jose Miguel Dye  Has the plan of care been signed (Y/N):        []  Yes  [x]  No     Date of Patient follow up with Physician:       Is this a Progress Report:     [x]  Yes  []  No        Progress report/ Recertification will be due (10 Rx or 30 days whichever is less): 8 Feb 2023      Visit # Insurance Allowable Auth Required   11 30 []  Yes []  No        Functional Scale:  FOTO Shoulder  10/17 - 55/100 11/9 - 59/100 1/11 - 57/100       Latex Allergy:  [x]NO      []YES  Preferred Language for Healthcare:   [x]English       []other:      Pain level:  2/25 with certain activities  0/67     SUBJECTIVE:  Pt states she feels like her pain is starting to get worse again. She states even at rest, she is starting to have pain. She states she was unable to complete  all of HEP due to pain. 1/11    OBJECTIVE: 1/11  Observation:   Test measurements:      ROM AROM  Comment     L R     Flexion 170 174     Abduction 108 148     ER 71 93     IR 82 60     Other(cervical)         Other                Strength L R Comment   Flexion 4+/5 4/5     Abduction 3+/5 + pain  4/5     ER 4+/5 4/5     IR 4/5 4/5                         Lower Trap  3+/5       Mid Trap 4-/5 4-/5     Rhomboids 4/5 4/5     Biceps 4+/5 4/5     Triceps 4+/5 4/5          Reflexes/Sensation:               [x]Dermatomes/Myotomes intact               [x]Reflexes equal and normal bilaterally              []Other:     Joint mobility:               [x]Normal                       []Hypo              []Hyper     Palpation:     Functional Mobility/Transfers: independent      Posture: rounded shoulders      Gait: (include devices/WB status):  WNL         RESTRICTIONS/PRECAUTIONS:     Exercises/Interventions:   Exercise/Equipment Resistance/Repetitions Other comments   Arm bike 5'  Added 11/21   Stretching/PROM     Wand     Table Slides     ER cane - supine Pulleys 10 sec x 10 ABD Added 10/17   Thoracic ext over foam roller           Isometrics     Retraction 10 sec x 10 Added 10/17        Weight shift     Flexion     Abduction 10 sec x 10  Added 10/17   External Rotation 10 sec x 10  Added 10/17   Internal Rotation 10 sec x 10  Added 10/19   Biceps     Triceps          PRE's     Flexion     Abduction     External Rotation Added 10/24   Internal Rotation Added 10/24   Shrugs     EXT     Reverse Flys     Serratus ^11/2   Horizontal Abd with ER    Biceps 10 x 3 5# ECL  ^11/30   Triceps - kickback 10 x 3 5# Added 11/30   Retraction          Cable Column/Theraband     External Rotation     Internal Rotation     Shrugs     Lats Added 11/2   Ext ^11/21   Flex    Scapular Retraction Added 10/26   BIC    TRIC    PNF- D2 ^12/28   Prone T Added 11/2   Prone Y Added 11/9        Dynamic Stability     Supine ABCs Ball on the wall 10 x 3 each 2-way, kickball Added 11/2   Wall push-ups Added 12/28   Body blade - shoulder ABD Added 12/28        Plyoback          Manual interventions     L shoulder stretching - flex, ABD, IR, and ER with STM to anterior shoulder            Therapeutic Exercise and NMR EXR  [x] (81831) Provided verbal/tactile cueing for activities related to strengthening, flexibility, endurance, ROM  for improvements in scapular, scapulothoracic and UE control with self care, reaching, carrying, lifting, house/yardwork, driving/computer work. [x] (90233) Provided verbal/tactile cueing for activities related to improving balance, coordination, kinesthetic sense, posture, motor skill, proprioception  to assist with  scapular, scapulothoracic and UE control with self care, reaching, carrying, lifting, house/yardwork, driving/computer work.     Therapeutic Activities:    [x] (81155 or 00700) Provided verbal/tactile cueing for activities related to improving balance, coordination, kinesthetic sense, posture, motor skill, proprioception and motor activation to allow for proper function of scapular, scapulothoracic and UE control with self care, carrying, lifting, driving/computer work. Home Exercise Program:    [x] (23276) Reviewed/Progressed HEP activities related to strengthening, flexibility, endurance, ROM of scapular, scapulothoracic and UE control with self care, reaching, carrying, lifting, house/yardwork, driving/computer work  [] (46401) Reviewed/Progressed HEP activities related to improving balance, coordination, kinesthetic sense, posture, motor skill, proprioception of scapular, scapulothoracic and UE control with self care, reaching, carrying, lifting, house/yardwork, driving/computer work      Access Code [email or web address]  Updated 12/28    Manual Treatments:  PROM / STM / Oscillations-Mobs:  G-I, II, III, IV (PA's, Inf., Post.)  [] (40576) Provided manual therapy to mobilize soft tissue/joints of cervical/CT, scapular GHJ and UE for the purpose of modulating pain, promoting relaxation,  increasing ROM, reducing/eliminating soft tissue swelling/inflammation/restriction, improving soft tissue extensibility and allowing for proper ROM for normal function with self care, reaching, carrying, lifting, house/yardwork, driving/computer work    Modalities:  CP - 15'  1/11   [] GAME READY (VASO)- for significant edema, swelling, pain control.      Charges:  Timed Code Treatment Minutes: 45'    Total Treatment Minutes: 70       [] EVAL (LOW) 45036 (typically 20 minutes face-to-face)  [] EVAL (MOD) 67874 (typically 30 minutes face-to-face)  [] EVAL (HIGH) 44864 (typically 45 minutes face-to-face)  [] RE-EVAL     [x] VG(73546) x  1  [] IONTO  [x] NMR (19004) x 1    [] VASO  [] Manual (11369) x    [] Other:  [x] TA x  1    [] Mech Traction (71512)  [] ES(attended) (98587)     [] ES (un) (05487):         ASSESSMENT:        GOALS: 1/11  Patient stated goal: reach with little to no pain, light weight lifting     [] Progressing: [] Met: [x] Not Met: [] Adjusted    Therapist goals for Patient:   Short Term Goals: To be achieved in: 2 weeks  1. Independent in HEP and progression per patient tolerance, in order to prevent re-injury. [] Progressing: [x] Met: [] Not Met: [] Adjusted   2. Patient will have a decrease in pain to facilitate improvement in movement, function, and ADLs as indicated by Functional Deficits. [] Progressing: [x] Met: [] Not Met: [] Adjusted    Long Term Goals: To be achieved in: 4-6 weeks  1. Patient will score greater than or equal to 73/100 on FOTO shoulder to assist with reaching prior level of function. [] Progressing: [] Met: [x] Not Met: [] Adjusted  2. Patient will demonstrate increased L shoulder flex AROM to greater than or equal to 170 deg, ABD AROM to greater than or equal to 145 deg, and ER AROM to greater than or equal to 90 deg to allow for proper joint functioning as indicated by patients Functional Deficits. [x] Progressing: [] Met: [] Not Met: [] Adjusted  3. Patient will demonstrate an increase in L shoulder (flex, ABD, IR, and ER) strength to 4+/5  to allow for proper functional mobility as indicated by patients Functional Deficits. [] Progressing: [] Met: [x] Not Met: [] Adjusted  4. Patient will return to ADLs without increased symptoms or restriction. [] Progressing: [] Met: [x] Not Met: [] Adjusted  5. Patient will return to independent workout pain free. [] Progressing: [] Met: [x] Not Met: [] Adjusted       Overall Progression Towards Functional goals/ Treatment Progress Update:  [] Patient is progressing as expected towards functional goals listed. [x] Progression is slowed due to complexities/Impairments listed. [] Progression has been slowed due to co-morbidities.   [] Plan just implemented, too soon to assess goals progression <30days   [] Goals require adjustment due to lack of progress  [] Patient is not progressing as expected and requires additional follow up with physician  [] Other    Prognosis for POC: [] Good [x] Fair  [] Poor      Patient requires continued skilled intervention: [x] Yes  [] No      Treatment/Activity Tolerance:  [] Patient tolerated treatment well [] Patient limited by fatique  [x] Patient limited by pain  [] Patient limited by other medical complications  [] Other: Patient with fair activity tolerance. An insidious increase in pain has resulted in decreased ROM and strength when compared to previous progress report. Patient will report back to doctor immediately following this visit. Patient will follow up with PT after following up with MRI results with MD. 1/11    Patient education: Patient education on PT and plan of care including diagnosis, prognosis, treatment goals and options. Patient agrees with discussed POC and treatment and is aware of rehab process. 10/17      PLAN: Hold Pending MD visit  [] Continue per plan of care [] Alter current plan (see comments above)  [] Plan of care initiated [x] Hold pending MD visit [] Discharge      Electronically signed by:  Adeline Beyer, PT, DPT    Note: If patient does not return for scheduled/ recommended follow up visits, this note will serve as a discharge from care along with most recent update on progress.

## 2023-01-11 NOTE — PROGRESS NOTES
Chief Complaint   Patient presents with    Follow-up     L shoulder. Requesting an MRI. Shoulder was getting better but is now worsening. HPI:      Layne Tate is a 45 y.o. female who presents for follow-up evaluation of left shoulder pain. I have been seeing Layne Tate since 10/12/2022 when she first came to me for left shoulder pain. At that time her strength was good and her pain was relatively mild so I diagnosed her with a low-grade partial tear and impingement syndrome and we got her into physical therapy. She continued to have pain and was having difficulty doing the physical therapy so a month later performed a subacromial steroid injection which did improve her symptoms to the point they were nearly resolved. In the past 2 weeks however her pain has come back without any inciting injury. She has continued to be compliant with her physical therapy exercises at this throughout this duration of time. Past Medical History:   Diagnosis Date    HIGH CHOLESTEROL     Hyperlipidemia     Hypertension     Vitamin D deficiency        Medication  Current Outpatient Medications   Medication Sig Dispense Refill    lisinopril (PRINIVIL;ZESTRIL) 10 MG tablet TAKE 1 TABLET BY MOUTH DAILY 30 tablet 11    meloxicam (MOBIC) 15 MG tablet TAKE 1 TABLET BY MOUTH DAILY AS NEEDED FOR PAIN 30 tablet 5    vitamin D 25 MCG (1000 UT) CAPS Take 4 capsules by mouth daily Stop vitamin D2 120 capsule 12    mometasone (ELOCON) 0.1 % cream Apply topically daily to rash prn 15 g 1    triamcinolone (KENALOG) 0.1 % cream Apply topically 2 times daily. 30 g 2     No current facility-administered medications for this visit. Allergies  Allergies   Allergen Reactions    Tetanus Toxoids Other (See Comments)     Hot, swollen, tender       Review of Systems:  Pertinent items are noted in HPI. Physical Examination: This is a pleasant female, alert, and in no acute distress.     Left shoulder exam: Full active and passive range of motion.  There is significant pain/painful arc with abduction particularly between  degrees.  Positive Taylor, positive Neer's.  Positive full can and positive empty can.  Negative Denton's.  Strength 5/5 with resisted internal rotation and external rotation.  4/5 strength with resisted abduction.    Vascular exam: Extremities warm and well perfused, no significant edema    Respiratory exam: Breathing easy and unlabored    Neuro exam: No focal neuro deficits    Lymphatic: No obvious lymphadenopathy    Skin: Warm, dry    Radiology:     3 view X-rays of the left shoulder dated 10/12/2022 were reviewed and interpreted independently today and discussed with the patient.  The films revealed: No acute osseous abnormalities.  Mild arthritic changes in the AC joint.    Assessment and Plan:     1. Chronic left shoulder pain  Luzma has had recurrence of her left shoulder pain without any specific inciting injury.  She was in PT for over 6 weeks and then is continue to physical therapy at home and has now had pain for over 3 months despite rehab and injections.  Given her failure to improve with conservative treatment I would like to get MRI to evaluate for high-grade partial thickness tear versus less likely full-thickness tear as she may require surgical correction at this point.  She verbally expressed understanding and agreement with the plan and all of her questions have been answered at this time.  - MRI left shoulder    Follow-up: After MRI.  Sooner with any problems, questions, concerns, or worsening symptoms.    Rohit Hoang MD  Primary Care Sports Medicine    Please note that this chart was at least partially generated using dragon dictation software.  Although every effort was made to ensure the accuracy of this automated transcription, some errors in transcription may have occurred.

## 2023-01-25 ENCOUNTER — OFFICE VISIT (OUTPATIENT)
Dept: ORTHOPEDIC SURGERY | Age: 53
End: 2023-01-25

## 2023-01-25 DIAGNOSIS — M25.512 CHRONIC LEFT SHOULDER PAIN: Primary | ICD-10-CM

## 2023-01-25 DIAGNOSIS — G89.29 CHRONIC LEFT SHOULDER PAIN: Primary | ICD-10-CM

## 2023-01-27 NOTE — PROGRESS NOTES
Chief Complaint   Patient presents with    Follow-up     MRI f/u L shoulder. HPI:      Ethan De Los Santos is a 45 y.o. female who presents for follow-up evaluation of left shoulder pain. She continues to have left shoulder pain, but it continues to come and go. Some days it is fine or minimally painful and other days it hurts more. She rates her pain as only a 3/10 at its worst. She comes in today for MRI f/u. MRI was done due to lack of improvement despite 3 months of PT and 1 SA injection. Past Medical History:   Diagnosis Date    HIGH CHOLESTEROL     Hyperlipidemia     Hypertension     Vitamin D deficiency        Medication  Current Outpatient Medications   Medication Sig Dispense Refill    lisinopril (PRINIVIL;ZESTRIL) 10 MG tablet TAKE 1 TABLET BY MOUTH DAILY 30 tablet 11    meloxicam (MOBIC) 15 MG tablet TAKE 1 TABLET BY MOUTH DAILY AS NEEDED FOR PAIN 30 tablet 5    vitamin D 25 MCG (1000 UT) CAPS Take 4 capsules by mouth daily Stop vitamin D2 120 capsule 12    mometasone (ELOCON) 0.1 % cream Apply topically daily to rash prn 15 g 1    triamcinolone (KENALOG) 0.1 % cream Apply topically 2 times daily. 30 g 2     No current facility-administered medications for this visit. Allergies  Allergies   Allergen Reactions    Tetanus Toxoids Other (See Comments)     Hot, swollen, tender       Review of Systems:  Pertinent items are noted in HPI. Physical Examination: This is a pleasant female, alert, and in no acute distress. There were no vitals filed for this visit. Left shoulder exam: painful arc with full ROM, 5/5 strength in all planes, + empty can, hawkin's, Neer's.  - O'quique's    Vascular exam: Extremities warm and well perfused, no significant edema    Respiratory exam: Breathing easy and unlabored    Neuro exam: No focal neuro deficits    Lymphatic: No obvious lymphadenopathy    Skin: Warm, dry    Radiology:     MRI of the left shoulder dated 1/18/23 were reviewed and interpreted independently today and discussed with the patient. The films revealed: moderate tendinopathy and bursitis of supraspinatus and infraspinatus tendons. There is a thin 8x5 mm articular sided concealed delamination of the supraspinatus without full depth or retracted tear. Nrrowing of the subacromial arch. Chronic nondisplaced SLAP 2B posteriosuperios labrum. Mild glenohumeral athritis. Moderate AC arthrosis. Assessment and Plan:     1. Chronic left shoulder pain  She has had several months of left shoulder pain and her MRI and exam are most suggestive of tendinitis and a split tear of the supraspinatus. We discussed her options today and ultimately decided to continue to pursue conservative treatment. She is unable to have the surgery until May at the earliest anyhow due to her work schedule. We will get her back into PT now and can consider repeat SA injection in 1 month if she would like to try that again. Follow-up: 1 month or as needed. Sooner with any problems, questions, concerns, or worsening symptoms. Antony Barthel, MD  Primary Care Sports Medicine    Please note that this chart was at least partially generated using dragon dictation software. Although every effort was made to ensure the accuracy of this automated transcription, some errors in transcription may have occurred.

## 2023-02-01 ENCOUNTER — HOSPITAL ENCOUNTER (OUTPATIENT)
Dept: PHYSICAL THERAPY | Age: 53
Setting detail: THERAPIES SERIES
Discharge: HOME | End: 2023-02-01
Payer: COMMERCIAL

## 2023-02-01 PROCEDURE — 97112 NEUROMUSCULAR REEDUCATION: CPT | Performed by: PHYSICAL THERAPIST

## 2023-02-01 PROCEDURE — 97530 THERAPEUTIC ACTIVITIES: CPT | Performed by: PHYSICAL THERAPIST

## 2023-02-01 PROCEDURE — 97110 THERAPEUTIC EXERCISES: CPT | Performed by: PHYSICAL THERAPIST

## 2023-02-01 NOTE — PLAN OF CARE
[facility] 75, [address] [place], [address]  Phone: [phone]   Fax: [phone]       Physical Therapy Re-Certification Plan of Care    Dear Dr. Savannah De Oliveira,    We had the pleasure of treating the following patient for physical therapy services at [address] [place] [address]. A summary of our findings can be found in the updated assessment below. This includes our plan of care. If you have any questions or concerns regarding these findings, please do not hesitate to contact me at the office phone number checked above. Thank you for the referral.     Physician Signature:________________________________Date:__________________  By signing above (or electronic signature), therapists plan is approved by physician      Overall Response to Treatment:   []Patient is responding well to treatment and improvement is noted with regards  to goals   []Patient should continue to improve in reasonable time if they continue HEP   []Patient has plateaued and is no longer responding to skilled PT intervention    []Patient is getting worse and would benefit from return to referring MD   []Patient unable to adhere to initial POC   []Other: Patient with fair activity tolerance. Patient was not limited by pain OTD and ws able to participate in all exercises without an increase in pain. Patient able to perform HEP without increasing verbal cueing. Patients FOTO increased from 57/100 to 63/100 suggesting a clinically significant change in function. Patient still reports decreased function with lateral reaching which will pain up to about a 3/10. Continued therapy is needed to maintain previous progress. Will begin monthly visits to PT to continue to monitor ROM and strength and update HEP as needed until possible surgery.  2/1    Date range of previous POC Visits: 12/14/22 - 1/11/23    Total Visits: 12      Physical Therapy Treatment Note/ Progress Report: Date:  2023    Patient Name:  Alia Steinberg    :  1970  MRN: [id number]  Restrictions/Precautions:    Medical/Treatment Diagnosis Information:  Diagnosis: M75.42 (ICD-10-CM) - Impingement syndrome of left shoulder  Treatment Diagnosis: decreased ROM, strength, posture, endurance, and high-level iADLs  Insurance/Certification information:  PT Insurance Information: [employer]  Physician Information:  Dr. Fermin Malave  Has the plan of care been signed (Y/N):        []  Yes  [x]  No     Date of Patient follow up with Physician:       Is this a Progress Report:     [x]  Yes  []  No        Progress report/ Recertification will be due (10 Rx or 30 days whichever is less): 2023      Visit # Insurance Allowable Auth Required   2  (10 in ) 30 []  Yes []  No        Functional Scale:  FOTO Shoulder  10/17 - 55/ - 59/ - 57 -      Latex Allergy:  [x]NO      []YES  Preferred Language for Healthcare:   [x]English       []other:      Pain level:  with certain activities      SUBJECTIVE:  Pt states that she is going to wait until the end of the school year to pursue a surgical consultation because she can't afford to miss schooling with her co-teacher leaving on maternity leave. Overall has been doing well, pain has been staying below a 3/10 at it's worst and has no issues with HEP at this time. Is receiving an injection on 2/15 to try and increase timeframe of being pain free and delaying surgical intervention as long as possible.      OBJECTIVE:   Observation:   Test measurements:      ROM AROM  Comment     L R     Flexion 175 174     Abduction 132 + pain 148     ER 70 93     IR 78 60     Other(cervical)         Other                Strength L R Comment   Flexion 4+/5 4/5     Abduction 4-/5 + pain  4/5     ER 4/5 4/5     IR 4+/5 4/5                         Lower Trap  3+/5       Mid Trap 4-/5 4-/5     Rhomboids 4/5 4/5     Biceps 4+/5 4/5 Triceps 4+/5 4/5          Reflexes/Sensation:               [x]Dermatomes/Myotomes intact               [x]Reflexes equal and normal bilaterally              []Other:     Joint mobility:               [x]Normal                       []Hypo              []Hyper     Palpation: No tenderness to palpation     Functional Mobility/Transfers: independent      Posture: rounded shoulders      Gait: (include devices/WB status):  WNL         RESTRICTIONS/PRECAUTIONS:     Exercises/Interventions:   Exercise/Equipment Resistance/Repetitions Other comments   Arm bike 5'  Added 11/21   Stretching/PROM     Wand     Table Slides     ER cane - supine Pulleys 10 sec x 10 ABD Added 10/17   Thoracic ext over foam roller           Isometrics     Retraction Added 10/17       Weight shift    Flexion    Abduction Added 10/17   External Rotation Added 10/17   Internal Rotation Added 10/19   Biceps     Triceps          PRE's     Flexion     Abduction     External Rotation Added 10/24   Internal Rotation Added 10/24   Shrugs     EXT     Reverse Flys     Serratus ^11/2   Horizontal Abd with ER    Biceps 10 x 3 5# ECL  ^11/30   Triceps - kickback 10 x 3 5# Added 11/30   Retraction          Cable Column/Theraband     External Rotation     Internal Rotation     Shrugs     Lats 10 x 3 black TB Added 11/2   Ext 10 x 3 black TB  ^11/21   Flex     Scapular Retraction 10 x 3 black TB  Added 10/26   BIC    TRIC    PNF- D2 ^12/28   Prone T Added 11/2   Prone Y Added 11/9   90 - 90 ER on wall 10 x 3 RTB Added 2/1   Dynamic Stability     Supine ABCs Ball on the wall 10 x 3 each 4-way, kickball Added 11/2   Wall push-ups Added 12/28   Body blade - shoulder ABD Added 12/28        [place]oback          Manual interventions     L shoulder stretching - flex, ABD, IR, and ER with STM to anterior shoulder            Therapeutic Exercise and NMR EXR  [x] (17593) Provided verbal/tactile cueing for activities related to strengthening, flexibility, endurance, ROM for improvements in scapular, scapulothoracic and UE control with self care, reaching, carrying, lifting, house/yardwork, driving/computer work. [x] (01846) Provided verbal/tactile cueing for activities related to improving balance, coordination, kinesthetic sense, posture, motor skill, proprioception  to assist with  scapular, scapulothoracic and UE control with self care, reaching, carrying, lifting, house/yardwork, driving/computer work. Therapeutic Activities:    [x] (67648 or 48267) Provided verbal/tactile cueing for activities related to improving balance, coordination, kinesthetic sense, posture, motor skill, proprioception and motor activation to allow for proper function of scapular, scapulothoracic and UE control with self care, carrying, lifting, driving/computer work.      Home Exercise Program:    [x] (50868) Reviewed/Progressed HEP activities related to strengthening, flexibility, endurance, ROM of scapular, scapulothoracic and UE control with self care, reaching, carrying, lifting, house/yardwork, driving/computer work  [] (66270) Reviewed/Progressed HEP activities related to improving balance, coordination, kinesthetic sense, posture, motor skill, proprioception of scapular, scapulothoracic and UE control with self care, reaching, carrying, lifting, house/yardwork, driving/computer work      Access Code [email or web address]  Updated 12/28    Manual Treatments:  PROM / STM / Oscillations-Mobs:  G-I, II, III, IV (PA's, Inf., Post.)  [] (59979) Provided manual therapy to mobilize soft tissue/joints of cervical/CT, scapular GHJ and UE for the purpose of modulating pain, promoting relaxation,  increasing ROM, reducing/eliminating soft tissue swelling/inflammation/restriction, improving soft tissue extensibility and allowing for proper ROM for normal function with self care, reaching, carrying, lifting, house/yardwork, driving/computer work    Modalities:  CP - 15'  2/1   [] GAME READY (VASO)- for significant edema, swelling, pain control. Charges:  Timed Code Treatment Minutes: 54'   Total Treatment Minutes: 79'      [] EVAL (LOW) 455 [id number] (typically 20 minutes face-to-face)  [] EVAL (MOD) 05569 (typically 30 minutes face-to-face)  [] EVAL (HIGH) 09957 (typically 45 minutes face-to-face)  [] RE-EVAL     [x] BR(82243) x  2  [] IONTO  [x] NMR (00756) x 1    [] VASO  [] Manual (32691) x    [] Other:  [x] TA x  1    [] Mech Traction (84302)  [] ES(attended) (39192)     [] ES (un) (83635):         ASSESSMENT:        GOALS: 2  Patient stated goal: reach with little to no pain, light weight lifting     [] Progressing: [] Met: [x] Not Met: [] Adjusted    Therapist goals for Patient:   Short Term Goals: To be achieved in: 2 weeks  1. Independent in HEP and progression per patient tolerance, in order to prevent re-injury. [] Progressing: [x] Met: [] Not Met: [] Adjusted   2. Patient will have a decrease in pain to facilitate improvement in movement, function, and ADLs as indicated by Functional Deficits. [] Progressing: [x] Met: [] Not Met: [] Adjusted    Long Term Goals: To be achieved in: 4-6 weeks  1. Patient will score greater than or equal to 73/100 on FOTO shoulder to assist with reaching prior level of function. [x] Progressin/100 [] Met: [] Not Met: [] Adjusted  2. Patient will demonstrate increased L shoulder flex AROM to greater than or equal to 170 deg, ABD AROM to greater than or equal to 145 deg, and ER AROM to greater than or equal to 90 deg to allow for proper joint functioning as indicated by patients Functional Deficits. [x] Progressing: ABD: 132 [] Met: [] Not Met: [] Adjusted  3. Patient will demonstrate an increase in L shoulder (flex, ABD, IR, and ER) strength to 4+/5  to allow for proper functional mobility as indicated by patients Functional Deficits. [x] Progressing: ER: 4/5, ABD 4-/5 [] Met: [] Not Met: [] Adjusted  4.  Patient will return to ADLs without increased symptoms or restriction. [x] Progressing: Has pain with lateral reaching [] Met: [] Not Met: [] Adjusted  5. Patient will return to independent workout pain free. [] Progressing: [] Met: [x] Not Met: [] Adjusted       Overall Progression Towards Functional goals/ Treatment Progress Update:  [] Patient is progressing as expected towards functional goals listed. [x] Progression is slowed due to complexities/Impairments listed. [] Progression has been slowed due to co-morbidities. [] Plan just implemented, too soon to assess goals progression <30days   [] Goals require adjustment due to lack of progress  [] Patient is not progressing as expected and requires additional follow up with physician  [] Other    Prognosis for POC: [] Good [x] Fair  [] Poor      Patient requires continued skilled intervention: [x] Yes  [] No      Treatment/Activity Tolerance:  [x] Patient tolerated treatment well [] Patient limited by fatique  [] Patient limited by pain  [] Patient limited by other medical complications  [] Other: Patient with fair activity tolerance. Patient was not limited by pain OTD and ws able to participate in all exercises without an increase in pain. Patient able to perform HEP without increasing verbal cueing. Patients FOTO increased from 57/100 to 63/100 suggesting a clinically significant change in function. Patient still reports decreased function with lateral reaching which will pain up to about a 3/10. Continued therapy is needed to maintain previous progress. Will begin monthly visits to PT to continue to monitor ROM and strength and update HEP as needed until possible surgery. 2/1    Patient education: Patient education on PT and plan of care including diagnosis, prognosis, treatment goals and options. Patient agrees with discussed POC and treatment and is aware of rehab process.  10/17      PLAN: 1x a month for 1-2 months (2/1/23 - 3/1/23)   [] Continue per plan of care [x] Alter current plan (see comments above)  [] Plan of care initiated [] Hold pending MD visit [] Discharge      Electronically signed by:    [place] student physical therapist  Therapist was present, directed the patient's care, made skilled judgement, and was responsible for assessment and treatment of the patient. Patient was in agreement to be treated by student physical therapist with licensed physical therapist present. Daily Quintero, PT, DPT    Note: If patient does not return for scheduled/ recommended follow up visits, this note will serve as a discharge from care along with most recent update on progress.

## 2023-02-08 SDOH — HEALTH STABILITY: PHYSICAL HEALTH: ON AVERAGE, HOW MANY DAYS PER WEEK DO YOU ENGAGE IN MODERATE TO STRENUOUS EXERCISE (LIKE A BRISK WALK)?: 5 DAYS

## 2023-02-08 ASSESSMENT — SOCIAL DETERMINANTS OF HEALTH (SDOH)
WITHIN THE LAST YEAR, HAVE YOU BEEN HUMILIATED OR EMOTIONALLY ABUSED IN OTHER WAYS BY YOUR PARTNER OR EX-PARTNER?: NO
WITHIN THE LAST YEAR, HAVE YOU BEEN AFRAID OF YOUR PARTNER OR EX-PARTNER?: NO
WITHIN THE LAST YEAR, HAVE TO BEEN RAPED OR FORCED TO HAVE ANY KIND OF SEXUAL ACTIVITY BY YOUR PARTNER OR EX-PARTNER?: NO
WITHIN THE LAST YEAR, HAVE YOU BEEN KICKED, HIT, SLAPPED, OR OTHERWISE PHYSICALLY HURT BY YOUR PARTNER OR EX-PARTNER?: NO

## 2023-02-09 ENCOUNTER — OFFICE VISIT (OUTPATIENT)
Dept: ORTHOPEDIC SURGERY | Age: 53
End: 2023-02-09
Payer: COMMERCIAL

## 2023-02-09 VITALS — BODY MASS INDEX: 42.68 KG/M2 | WEIGHT: 250 LBS | HEIGHT: 64 IN

## 2023-02-09 DIAGNOSIS — M75.42 IMPINGEMENT SYNDROME OF LEFT SHOULDER: ICD-10-CM

## 2023-02-09 DIAGNOSIS — M75.22 BICEPS TENDINITIS OF LEFT SHOULDER: ICD-10-CM

## 2023-02-09 DIAGNOSIS — G89.29 CHRONIC LEFT SHOULDER PAIN: Primary | ICD-10-CM

## 2023-02-09 DIAGNOSIS — M25.512 CHRONIC LEFT SHOULDER PAIN: Primary | ICD-10-CM

## 2023-02-09 PROCEDURE — 99214 OFFICE O/P EST MOD 30 MIN: CPT | Performed by: ORTHOPAEDIC SURGERY

## 2023-02-09 RX ORDER — TRIAMCINOLONE ACETONIDE 0.25 MG/G
CREAM TOPICAL
COMMUNITY
Start: 2023-01-11 | End: 2023-02-09

## 2023-02-09 NOTE — PROGRESS NOTES
Neto Roy is seen today for potential surgical treatment involving the left shoulder. She was referred by Dr. Mu Arevalo.  She is right-hand dominant. 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. She began having pain in October 2022. She had a cortisone injection which she says did not provide much relief. She did physical therapy and initially did well. But then seems to have plateaued. I spoke with her therapist to confirm she has not made much progress in the last couple of months. Pain is about 4 out of 10 principally with abduction. Even doing something lightly like picking up a cup causes pain. She occasionally has pain at night. She has been taking meloxicam but she is not sure if that helps. Past history significant only for having had bunion surgery and high blood pressure. History: Patient's relevant past family, medical, and social history are reviewed as part of today's visit. ROS of pertinent positives and negatives as above; otherwise negative. General Exam:    Vitals: Height 5' 4\" (1.626 m), weight 250 lb (113.4 kg), not currently breastfeeding. Constitutional: Patient is adequately groomed with no evidence of malnutrition  Mental Status: The patient is oriented to time, place and person. The patient's mood and affect are appropriate. Gait:  Patient walks with normal gait and station. Lymphatic: The lymphatic examination bilaterally reveals all areas to be without enlargement or induration. Vascular: Examination reveals no swelling or calf tenderness. Peripheral pulses are palpable and 2+. Neurological: The patient has good coordination. There is no weakness or sensory deficit. Skin:    Head/Neck: inspection reveals no rashes, ulcerations or lesions. Trunk:  inspection reveals no rashes, ulcerations or lesions. Right Lower Extremity: inspection reveals no rashes, ulcerations or lesions.   Left Lower Extremity: inspection reveals no rashes, ulcerations or lesions. Examination of the cervical spine reveals no restriction in motion. There are no reproduction of symptoms into either arm with flexion, extension, rotation or palpation. The patient has a negative Spurling sign, and no tenderness. Examination of the right shoulder reveals normal scapular control and no prominence. There is no pain over the acromioclavicular or sternoclavicular joints. The patient has no biceps pain. There is full range of motion. There is no pain with impingement testing. There is no pain with Taylor maneuver. Concan's maneuver is normal.  There is no pain or apprehension in the abducted externally rotated position. There is no sulcus sign. There is no instability with anterior or posterior stress applied. The patient demonstrates full strength in the supraspinatus, infraspinatus, and subscapularis. Neurologic and vascular examination of the upper extremity  is normal.    Left shoulder has significant tenderness over the long head bicep. She has no AC joint pain. He has no pain with cross body adduction. She has pain with Taylor and Concan's maneuver however. She has pain stressing the supraspinatus but maintains full strength. She has no significant restriction in glenohumeral motion. Neurologic and vascular exams are normal.        Left shoulder x-rays are reviewed showing mild degenerative change at the subacromial space and AC joint. Left shoulder MRI is reviewed and it demonstrates:  FINDINGS:  Rotator Cuff: Mild tendinopathy and peritendinobursitis of the supraspinatus and    infraspinatus with thin concealed humeral sided delamination of the supraspinatus anterior    footprint measuring 8 x 5 mm in size (AP x ML). No full-thickness or retracted tear. The teres    minor tendon is intact. Mild tendinopathy of the subscapularis tendon, without tear.        Mild tendinopathy of the long head biceps tendon intra-articular segment, without    full-thickness tear. The biceps tendon is normally positioned within the bicipital groove. No acute muscle tear, injury, or evidence of volumetric muscle atrophy. No acute fracture or evidence of glenohumeral dislocation injury. The humeral head is centered    within the glenoid. Chondral thinning of the glenohumeral articular surfaces without penetrating erosion or    prominent spurring. Chronic SLAP type 2B tear of the posterosuperior glenoid labrum without displaced fragment or    paralabral cyst formation. No quadrilateral or suprascapular space lesion. Moderate hypertrophy of the acromioclavicular joint, without reactive osteoedema or active    capsulitis. No acute AC joint separation, or injury of the acromioclavicular and    coracoclavicular ligaments. Type 1 acromion configuration. Mild subacromial bursitis. No joint effusion or intra-articular bodies. CONCLUSION:   1. Moderate confluent tendinopathy and peritendinobursitis of the supraspinatus and    infraspinatus tendons, with thin, 8 x 5 mm humeral sided concealed delamination of the    supraspinatus anterior footprint. No full depth or retracted rotator cuff tear. 2. Ligamentous narrowing of the lateral subacromial arch. 3. Chronic, nondisplaced SLAP type 2B tear of the posterosuperior glenoid labrum. Underlying    mild glenohumeral joint arthrosis. 4. Moderate AC joint arthrosis, without acute AC joint separation or injury. Mild subacromial    bursitis. I reviewed these findings on the report and the images with the patient. Assessment: Physical exam most concerning for bicep tendinitis. She has had only minimal response to physical therapy for her left shoulder. Plan: We reviewed the risks, benefits, and alternatives to surgery. The alternatives include conservative management including medications, injections, and physical therapy as well as observation.   Risks of surgery include but are not limited to persistent pain, instability, and reinjury. Risks also include risk of infection which could result in the need for further surgery and long-term use of antibiotics. Risks also include deep venous thromboses and pulmonary emboli. Risks also include problems with anesthesia including but not limited to cardiovascular compromise , stroke,  and death. The patient understands that the goal of surgery is to improve pain and function but that can never be guaranteed. 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 We discussed surgery and the potential recovery at length. I am recommending that she not get another injection in the shoulder as the first 1 was not very helpful and I would not recommend 2 injections prior to surgery. She is going to maintain her home exercise program especially can come back and see me in the beginning of May to determine if she would like to consider surgical treatment. All questions have been answered at length. Medical decision making today was moderate.

## 2023-03-01 ENCOUNTER — HOSPITAL ENCOUNTER (OUTPATIENT)
Dept: PHYSICAL THERAPY | Age: 53
Setting detail: THERAPIES SERIES
Discharge: HOME | End: 2023-03-01
Payer: COMMERCIAL

## 2023-03-01 ENCOUNTER — APPOINTMENT (OUTPATIENT)
Dept: PHYSICAL THERAPY | Age: 53
End: 2023-03-01
Payer: COMMERCIAL

## 2023-03-01 PROCEDURE — 97110 THERAPEUTIC EXERCISES: CPT | Performed by: PHYSICAL THERAPIST

## 2023-03-01 PROCEDURE — 97530 THERAPEUTIC ACTIVITIES: CPT | Performed by: PHYSICAL THERAPIST

## 2023-03-01 PROCEDURE — 97112 NEUROMUSCULAR REEDUCATION: CPT | Performed by: PHYSICAL THERAPIST

## 2023-03-01 NOTE — FLOWSHEET NOTE
[facility], [address] [place], [address]  Phone: [phone]   Fax: [phone]          Physical Therapy Treatment Note/ Progress Report:     Date:  3/1/2023    Patient Name:  Jewel George    :  1970  MRN: [id number]  Restrictions/Precautions:    Medical/Treatment Diagnosis Information:  Diagnosis: M75.42 (ICD-10-CM) - Impingement syndrome of left shoulder  Treatment Diagnosis: decreased ROM, strength, posture, endurance, and high-level iADLs  Insurance/Certification information:  PT Insurance Information: [place]  Physician Information:  Dr. Tommie Godinez  Has the plan of care been signed (Y/N):        []  Yes  [x]  No     Date of Patient follow up with Physician:       Is this a Progress Report:     [x]  Yes  []  No        Progress report/ Recertification will be due (10 Rx or 30 days whichever is less): 2023      Visit # Insurance Allowable Auth Required   3  (10 in ) 30 []  Yes []  No        Functional Scale:  FOTO Shoulder  10/17 - 55/ - 59/ - 57/ -      Latex Allergy:  [x]NO      []YES  Preferred Language for Healthcare:   [x]English       []other:      Pain level: 1 /10   3/1    SUBJECTIVE:  Pt states that it depends on the day how she feels. She states there are days she just feels achy, but then days she does not know she has pain. She does modify her HEP dependent on how she is feeling. She states HEP is going well. She states there is never a day she felt like she couldn't do something. Sleeping is ok, but sleeps on her L side.    3/1    OBJECTIVE:   Observation:   Test measurements:      ROM AROM  Comment     L R     Flexion 175 174     Abduction 132 + pain 148     ER 70 93     IR 78 60     Other(cervical)         Other                Strength L R Comment   Flexion 4+/5 4/5     Abduction 4-/5 + pain  4/5     ER 4/5 4/5     IR 4+/5 4/5   Lower Trap  3+/5       Mid Trap 4-/5 4-/5     Rhomboids 4/5 4/5     Biceps 4+/5 4/5     Triceps 4+/5 4/5          Reflexes/Sensation:               [x]Dermatomes/Myotomes intact               [x]Reflexes equal and normal bilaterally              []Other:     Joint mobility:               [x]Normal                       []Hypo              []Hyper     Palpation: No tenderness to palpation     Functional Mobility/Transfers: independent      Posture: rounded shoulders      Gait: (include devices/WB status): WNL         RESTRICTIONS/PRECAUTIONS:     Exercises/Interventions:   Exercise/Equipment Resistance/Repetitions Other comments   Arm bike 5'  Added 11/21   Stretching/PROM     Wand     Table Slides     ER cane - supine Pulleys 10 sec x 10 ABD Added 10/17   Thoracic ext over foam roller                PRE's     Flexion     Abduction     External Rotation 10 x 3 blue TB ^3/1   Internal Rotation 10 x 3 blue TB  Added 10/24   Shrugs     EXT     Reverse Flys     Serratus ^11/2   Horizontal Abd with ER    Biceps 10 x 3 6# ECL  ^3/1   Triceps - kickback 10 x 3 6# ^3/1   Retraction          Cable Column/Theraband     External Rotation     Internal Rotation     Shrugs     Lats 10 x 3 black TB Added 11/2   Ext 10 x 3 black TB  ^11/21   Flex     Scapular Retraction 10 x 3 black TB  Added 10/26   BIC    TRIC    PNF- D2 10 x 3  1# ^12/28   Prone T 10 x 3 Added 11/2   Prone Y Added 11/9   90 - 90 ER  10 x 3 RTB Added 2/1        Dynamic Stability     Supine ABCs Ball on the wall 10 x 3 each 4-way, 2# ^3/1   Wall push-ups 10 x 3  Added 12/28   Body blade - shoulder ABD Added 12/28        [place]          Manual interventions     L shoulder stretching - flex, ABD, IR, and ER with STM to anterior shoulder            Therapeutic Exercise and NMR EXR  [x] (63336) Provided verbal/tactile cueing for activities related to strengthening, flexibility, endurance, ROM  for improvements in scapular, scapulothoracic and UE control with self  care, reaching, carrying, lifting, house/yardwork, driving/computer work. [x] (79913) Provided verbal/tactile cueing for activities related to improving balance, coordination, kinesthetic sense, posture, motor skill, proprioception  to assist with  scapular, scapulothoracic and UE control with self care, reaching, carrying, lifting, house/yardwork, driving/computer work. Therapeutic Activities:    [x] (34443 or 46306) Provided verbal/tactile cueing for activities related to improving balance, coordination, kinesthetic sense, posture, motor skill, proprioception and motor activation to allow for proper function of scapular, scapulothoracic and UE control with self care, carrying, lifting, driving/computer work. Home Exercise Program:    [x] (52215) Reviewed/Progressed HEP activities related to strengthening, flexibility, endurance, ROM of scapular, scapulothoracic and UE control with self care, reaching, carrying, lifting, house/yardwork, driving/computer work  [] (58040) Reviewed/Progressed HEP activities related to improving balance, coordination, kinesthetic sense, posture, motor skill, proprioception of scapular, scapulothoracic and UE control with self care, reaching, carrying, lifting, house/yardwork, driving/computer work      Access Code [email or web address]  Updated 12/28    Manual Treatments:  PROM / STM / Oscillations-Mobs:  G-I, II, III, IV (PA's, Inf., Post.)  [] ([id number]) Provided manual therapy to mobilize soft tissue/joints of cervical/CT, scapular GHJ and UE for the purpose of modulating pain, promoting relaxation,  increasing ROM, reducing/eliminating soft tissue swelling/inflammation/restriction, improving soft tissue extensibility and allowing for proper ROM for normal function with self care, reaching, carrying, lifting, house/yardwork, driving/computer work    Modalities:  CP - 15'  3/1   [] GAME READY (VASO)- for significant edema, swelling, pain control.      Charges:  Timed Code Treatment Minutes: 48'    Total Treatment Minutes: 79'       [] EVAL (LOW) 28613 (typically 20 minutes face-to-face)  [] EVAL (MOD) 87826 (typically 30 minutes face-to-face)  [] EVAL (HIGH) 88574 (typically 45 minutes face-to-face)  [] RE-EVAL     [x] DZ(82768) x  1  [] IONTO  [x] NMR (38356) x 1    [] VASO  [] Manual (59188) x    [] Other:  [x] TA x  1    [] Mech Traction (81306)  [] ES(attended) (58417)     [] ES (un) (06877):         ASSESSMENT:        GOALS:   Patient stated goal: reach with little to no pain, light weight lifting     [] Progressing: [] Met: [x] Not Met: [] Adjusted    Therapist goals for Patient:   Short Term Goals: To be achieved in: 2 weeks  1. Independent in HEP and progression per patient tolerance, in order to prevent re-injury. [] Progressing: [x] Met: [] Not Met: [] Adjusted   2. Patient will have a decrease in pain to facilitate improvement in movement, function, and ADLs as indicated by Functional Deficits. [] Progressing: [x] Met: [] Not Met: [] Adjusted    Long Term Goals: To be achieved in: 4-6 weeks  1. Patient will score greater than or equal to 73/100 on FOTO shoulder to assist with reaching prior level of function. [x] Progressin/100 [] Met: [] Not Met: [] Adjusted  2. Patient will demonstrate increased L shoulder flex AROM to greater than or equal to 170 deg, ABD AROM to greater than or equal to 145 deg, and ER AROM to greater than or equal to 90 deg to allow for proper joint functioning as indicated by patients Functional Deficits. [x] Progressing: ABD: 132 [] Met: [] Not Met: [] Adjusted  3. Patient will demonstrate an increase in L shoulder (flex, ABD, IR, and ER) strength to 4+/5  to allow for proper functional mobility as indicated by patients Functional Deficits. [x] Progressing: ER: 4/5, ABD 4-/5 [] Met: [] Not Met: [] Adjusted  4. Patient will return to ADLs without increased symptoms or restriction.    [x] Progressing: Has pain with lateral reaching [] Met: [] Not Met: [] Adjusted  5. Patient will return to independent workout pain free.   [] Progressing: [] Met: [x] Not Met: [] Adjusted       Overall Progression Towards Functional goals/ Treatment Progress Update:  [] Patient is progressing as expected towards functional goals listed.    [x] Progression is slowed due to complexities/Impairments listed.  [] Progression has been slowed due to co-morbidities.  [] Plan just implemented, too soon to assess goals progression <30days   [] Goals require adjustment due to lack of progress  [] Patient is not progressing as expected and requires additional follow up with physician  [] Other    Prognosis for POC: [] Good [x] Fair  [] Poor      Patient requires continued skilled intervention: [x] Yes  [] No      Treatment/Activity Tolerance:  [x] Patient tolerated treatment well [] Patient limited by fatique  [] Patient limited by pain  [] Patient limited by other medical complications  [] Other: Patient genie session well with no increase in pain. She required min VCs for proper form with 90/90 ER to maintain shoulder ABD while completing exercise. She did genie a weighted ball with ball on the wall, with fatigue.   3/1    Patient education: Patient education on PT and plan of care including diagnosis, prognosis, treatment goals and options. Patient agrees with discussed POC and treatment and is aware of rehab process. 10/17      PLAN: 1x a month for 1-2 months (2/1/23 - 3/1/23)   [] Continue per plan of care [x] Alter current plan (see comments above)  [] Plan of care initiated [] Hold pending MD visit [] Discharge      Electronically signed by:      Hope aVzquez, PT, DPT    Note: If patient does not return for scheduled/ recommended follow up visits, this note will serve as a discharge from care along with most recent update on progress.

## 2023-04-19 ENCOUNTER — HOSPITAL ENCOUNTER (OUTPATIENT)
Dept: PHYSICAL THERAPY | Age: 53
Setting detail: THERAPIES SERIES
Discharge: HOME | End: 2023-04-19
Payer: COMMERCIAL

## 2023-04-19 PROCEDURE — 97530 THERAPEUTIC ACTIVITIES: CPT | Performed by: PHYSICAL THERAPIST

## 2023-04-19 PROCEDURE — 97110 THERAPEUTIC EXERCISES: CPT | Performed by: PHYSICAL THERAPIST

## 2023-04-19 NOTE — PLAN OF CARE
related to improving balance, coordination, kinesthetic sense, posture, motor skill, proprioception and motor activation to allow for proper function of scapular, scapulothoracic and UE control with self care, carrying, lifting, driving/computer work. Home Exercise Program:    [x] (49750) Reviewed/Progressed HEP activities related to strengthening, flexibility, endurance, ROM of scapular, scapulothoracic and UE control with self care, reaching, carrying, lifting, house/yardwork, driving/computer work  [] (01008) Reviewed/Progressed HEP activities related to improving balance, coordination, kinesthetic sense, posture, motor skill, proprioception of scapular, scapulothoracic and UE control with self care, reaching, carrying, lifting, house/yardwork, driving/computer work      Access Code [email or web address]  Updated 12/28    Manual Treatments:  PROM / STM / Oscillations-Mobs:  G-I, II, III, IV (PA's, Inf., Post.)  [] (79649) Provided manual therapy to mobilize soft tissue/joints of cervical/CT, scapular GHJ and UE for the purpose of modulating pain, promoting relaxation,  increasing ROM, reducing/eliminating soft tissue swelling/inflammation/restriction, improving soft tissue extensibility and allowing for proper ROM for normal function with self care, reaching, carrying, lifting, house/yardwork, driving/computer work    Modalities:  CP - 15'  4/19   [] GAME READY (VASO)- for significant edema, swelling, pain control.      Charges:  Timed Code Treatment Minutes: 50'    Total Treatment Minutes: 79'      [] EVAL (LOW) 455 1011 (typically 20 minutes face-to-face)  [] EVAL (MOD) 10747 (typically 30 minutes face-to-face)  [] EVAL (HIGH) 36686 (typically 45 minutes face-to-face)  [] RE-EVAL     [x] UG(37183) x  2  [] IONTO  [] NMR (11668) x     [] VASO  [] Manual (03369) x    [] Other:  [x] TA x  1    [] Mech Traction (11230)  [] ES(attended) (64509)     [] ES (un) (84257):         ASSESSMENT:        GOALS: 4/19  Patient stated goal:

## 2023-05-08 DIAGNOSIS — M51.36 DDD (DEGENERATIVE DISC DISEASE), LUMBAR: ICD-10-CM

## 2023-05-09 RX ORDER — MELOXICAM 15 MG/1
TABLET ORAL
Qty: 30 TABLET | Refills: 1 | Status: SHIPPED | OUTPATIENT
Start: 2023-05-09

## 2023-05-15 ENCOUNTER — OFFICE VISIT (OUTPATIENT)
Dept: ORTHOPEDIC SURGERY | Age: 53
End: 2023-05-15

## 2023-05-15 VITALS — WEIGHT: 250 LBS | BODY MASS INDEX: 42.68 KG/M2 | HEIGHT: 64 IN | RESPIRATION RATE: 12 BRPM

## 2023-05-15 DIAGNOSIS — M75.22 BICEPS TENDINITIS OF LEFT SHOULDER: ICD-10-CM

## 2023-05-15 DIAGNOSIS — M75.42 IMPINGEMENT SYNDROME OF LEFT SHOULDER: ICD-10-CM

## 2023-05-15 DIAGNOSIS — M25.512 CHRONIC LEFT SHOULDER PAIN: Primary | ICD-10-CM

## 2023-05-15 DIAGNOSIS — G89.29 CHRONIC LEFT SHOULDER PAIN: Primary | ICD-10-CM

## 2023-05-15 NOTE — PROGRESS NOTES
Galvin Severin returns today to follow-up her left shoulder. She has only 1 out of 10 pain at rest but with activity such as reaching out to the left side pain is about 4 out of 10. She has anterior, lateral, and posterior discomfort. General Exam:    Vitals: Resp. rate 12, height 5' 4\" (1.626 m), weight 250 lb (113.4 kg), not currently breastfeeding. Constitutional: Patient is adequately groomed with no evidence of malnutrition  Mental Status: The patient is oriented to time, place and person. The patient's mood and affect are appropriate. Left shoulder has significant tenderness over the long head bicep. She has pain with Taylor and impingement maneuvers and pain stressing the supraspinatus. I again reviewed her MRI scan:      FINDINGS:  Rotator Cuff: Mild tendinopathy and peritendinobursitis of the supraspinatus and   infraspinatus with thin concealed humeral sided delamination of the supraspinatus anterior   footprint measuring 8 x 5 mm in size (AP x ML). No full-thickness or retracted tear. The teres    minor tendon is intact. Mild tendinopathy of the subscapularis tendon, without tear. Mild tendinopathy of the long head biceps tendon intra-articular segment, without   full-thickness tear. The biceps tendon is normally positioned within the bicipital groove. No acute muscle tear, injury, or evidence of volumetric muscle atrophy. No acute fracture or evidence of glenohumeral dislocation injury. The humeral head is centered   within the glenoid. Chondral thinning of the glenohumeral articular surfaces without penetrating erosion or   prominent spurring. Chronic SLAP type 2B tear of the posterosuperior glenoid labrum without displaced fragment or   paralabral cyst formation. No quadrilateral or suprascapular space lesion. Moderate hypertrophy of the acromioclavicular joint, without reactive osteoedema or active   capsulitis.   No acute AC joint separation, or

## 2023-05-21 DIAGNOSIS — E55.9 VITAMIN D DEFICIENCY: ICD-10-CM

## 2023-05-22 NOTE — TELEPHONE ENCOUNTER
LAST OFFICE VISIT :03/01/2023        Future Appointments   Date Time Provider Tobin Coffman   5/24/2023  9:40 AM [place] [address]

## 2023-05-24 ENCOUNTER — HOSPITAL ENCOUNTER (OUTPATIENT)
Dept: WOMENS IMAGING | Age: 53
Discharge: HOME | End: 2023-05-24
Payer: COMMERCIAL

## 2023-05-24 DIAGNOSIS — Z12.31 SCREENING MAMMOGRAM, ENCOUNTER FOR: ICD-10-CM

## 2023-05-24 PROCEDURE — 77067 SCR MAMMO BI INCL CAD: CPT

## 2023-07-14 DIAGNOSIS — E55.9 VITAMIN D DEFICIENCY: ICD-10-CM

## 2023-07-14 DIAGNOSIS — M51.36 DDD (DEGENERATIVE DISC DISEASE), LUMBAR: ICD-10-CM

## 2023-07-14 RX ORDER — MELOXICAM 15 MG/1
15 TABLET ORAL PRN
Qty: 30 TABLET | Refills: 0 | Status: SHIPPED | OUTPATIENT
Start: 2023-07-14 | End: 2023-09-11

## 2023-09-08 DIAGNOSIS — E55.9 VITAMIN D DEFICIENCY: ICD-10-CM

## 2023-09-08 DIAGNOSIS — M51.36 DDD (DEGENERATIVE DISC DISEASE), LUMBAR: ICD-10-CM

## 2023-09-11 RX ORDER — MELOXICAM 15 MG/1
TABLET ORAL
Qty: 30 TABLET | Refills: 0 | Status: SHIPPED | OUTPATIENT
Start: 2023-09-11 | End: 2023-09-27 | Stop reason: SDUPTHER

## 2023-09-11 NOTE — TELEPHONE ENCOUNTER
Please advise pt that I sent 1 mo of prescription in. They are overdue for an appointment and will not get any further refills until they are seen. If you cannot get a hold of the patient please send a letter. Thanks!

## 2023-10-02 SDOH — HEALTH STABILITY: PHYSICAL HEALTH: ON AVERAGE, HOW MANY DAYS PER WEEK DO YOU ENGAGE IN MODERATE TO STRENUOUS EXERCISE (LIKE A BRISK WALK)?: 2 DAYS

## 2023-10-02 SDOH — HEALTH STABILITY: PHYSICAL HEALTH: ON AVERAGE, HOW MANY MINUTES DO YOU ENGAGE IN EXERCISE AT THIS LEVEL?: 10 MIN

## 2023-10-05 ENCOUNTER — OFFICE VISIT (OUTPATIENT)
Dept: ORTHOPEDIC SURGERY | Age: 53
End: 2023-10-05

## 2023-10-05 VITALS — HEIGHT: 64 IN | BODY MASS INDEX: 42.68 KG/M2 | WEIGHT: 250 LBS

## 2023-10-05 DIAGNOSIS — M25.511 ACUTE PAIN OF RIGHT SHOULDER: Primary | ICD-10-CM

## 2023-10-05 RX ORDER — METHYLPREDNISOLONE ACETATE 40 MG/ML
80 INJECTION, SUSPENSION INTRA-ARTICULAR; INTRALESIONAL; INTRAMUSCULAR; SOFT TISSUE ONCE
Status: COMPLETED | OUTPATIENT
Start: 2023-10-05 | End: 2023-10-05

## 2023-10-05 RX ADMIN — METHYLPREDNISOLONE ACETATE 80 MG: 40 INJECTION, SUSPENSION INTRA-ARTICULAR; INTRALESIONAL; INTRAMUSCULAR; SOFT TISSUE at 15:56

## 2023-10-05 RX ADMIN — Medication 2 ML: at 15:55

## 2023-10-05 NOTE — PROGRESS NOTES
interpreted by me : AP in the scapular plane, axillary lateral, and scapular Y. These demonstrate: Mild AC arthrosis. 2 view x-rays right clavicle obtained today in the office and interpreted by me demonstrate arthritis at the [facility] joint but no acute bony abnormalities are noted. Assessment: Right before meals and SC joint pain    Plan: Cortisone injection of the right AC joint. She agrees with this plan. Procedure: After obtaining verbal consent, under sterile technique right shoulder is injected at the [facility] joint directly superiorly with 80 mg of Depo-Medrol and 20 mg of lidocaine. Follow-up with me on an as-needed basis.

## 2023-10-26 ENCOUNTER — OFFICE VISIT (OUTPATIENT)
Dept: ORTHOPEDIC SURGERY | Age: 53
End: 2023-10-26

## 2023-10-26 VITALS — WEIGHT: 250 LBS | BODY MASS INDEX: 42.68 KG/M2 | HEIGHT: 64 IN

## 2023-10-26 DIAGNOSIS — M25.511 STERNOCLAVICULAR JOINT PAIN, RIGHT: ICD-10-CM

## 2023-10-26 DIAGNOSIS — M25.511 ACUTE PAIN OF RIGHT SHOULDER: Primary | ICD-10-CM

## 2023-10-26 RX ORDER — METHYLPREDNISOLONE ACETATE 40 MG/ML
80 INJECTION, SUSPENSION INTRA-ARTICULAR; INTRALESIONAL; INTRAMUSCULAR; SOFT TISSUE ONCE
Status: COMPLETED | OUTPATIENT
Start: 2023-10-26 | End: 2023-10-26

## 2023-10-26 RX ADMIN — Medication 4 ML: at 16:08

## 2023-10-26 RX ADMIN — METHYLPREDNISOLONE ACETATE 80 MG: 40 INJECTION, SUSPENSION INTRA-ARTICULAR; INTRALESIONAL; INTRAMUSCULAR; SOFT TISSUE at 16:08

## 2023-10-26 NOTE — PROGRESS NOTES
Kimball Severs follows up for right clavicle pain today. She did not get any substantial improvement from her [facility] joint injection. She is frustrated. Pain is all along the collarbone but worse at the [facility] joint. Pain is 5 out of 10. General Exam:    Vitals: Height 1.626 m (5' 4\"), weight 113.4 kg (250 lb), not currently breastfeeding. Constitutional: Patient is adequately groomed with no evidence of malnutrition  Mental Status: The patient is oriented to time, place and person. The patient's mood and affect are appropriate. Right clavicle is moderately tender throughout. She has some prominence and tenderness at the [facility] joint. She has no instability that I can elicit. 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 She has had some level of discomfort for as far back as 2018. Plan: We discussed the option of trying a intracapsular injection at the sternoclavicular joint. It certainly would not go deep into the joint. We discussed the risk of this at length especially the risk of persistent discomfort. If she does not get substantial improvement from the shot we will make referral over to Dr. Samantha Madison to see if he thinks [facility] joint surgery would be indicated. I do not have experience in this procedure. Procedure: After obtaining verbal consent under sterile technique right shoulder was injected at the sternoclavicular joint the point of maximal tenderness with 80 mg Depo-Medrol and 10 mg of lidocaine. She tolerated that well.

## 2023-11-19 SDOH — HEALTH STABILITY: PHYSICAL HEALTH: ON AVERAGE, HOW MANY DAYS PER WEEK DO YOU ENGAGE IN MODERATE TO STRENUOUS EXERCISE (LIKE A BRISK WALK)?: 0 DAYS

## 2023-11-19 SDOH — HEALTH STABILITY: PHYSICAL HEALTH: ON AVERAGE, HOW MANY MINUTES DO YOU ENGAGE IN EXERCISE AT THIS LEVEL?: 0 MIN

## 2023-11-22 ENCOUNTER — OFFICE VISIT (OUTPATIENT)
Dept: ORTHOPEDIC SURGERY | Age: 53
End: 2023-11-22
Payer: COMMERCIAL

## 2023-11-22 VITALS — WEIGHT: 250 LBS | HEIGHT: 64 IN | BODY MASS INDEX: 42.68 KG/M2

## 2023-11-22 DIAGNOSIS — M25.511 ACUTE PAIN OF RIGHT SHOULDER: ICD-10-CM

## 2023-11-22 DIAGNOSIS — M25.511 STERNOCLAVICULAR JOINT PAIN, RIGHT: Primary | ICD-10-CM

## 2023-11-22 PROCEDURE — 99214 OFFICE O/P EST MOD 30 MIN: CPT | Performed by: ORTHOPAEDIC SURGERY

## 2023-11-22 NOTE — PROGRESS NOTES
[address]  History and Physical  Shoulder Pain    Date:  2023    Name:  Lior Jolley  Address:  [facility] [id number]    :  1970      Age:   52 y.o.    SSN:  [id number]      Medical Record Number:  [id number]    Reason for Visit:    Shoulder Pain (F/U RIGHT SHOULDER)      HPI:   Lior Jolley is a 52 y.o. female who presents to our office today complaining of  right shoulder pain. She was referred by Dr. Shay Sanchez for evaluation of the sternoclavicular pain of the right shoulder AC joint injection was not helpful but the diagnostic right SC joint provided tremendous relief. The injection was performed on 10/26/2023. She denies any injuries. She denies any dysphagia or SOB. Pain Assessment  Location of Pain: Shoulder  Location Modifiers: Right  Severity of Pain: 0  Quality of Pain: Aching, Sharp  Duration of Pain: Persistent  Frequency of Pain: Intermittent  Aggravating Factors:  (certain movements)  Limiting Behavior: Yes  Relieving Factors: Rest, Nsaids  Work-Related Injury: No  Are there other pain locations you wish to document?: No    Review of Systems:  A 14 point review of systems available in the scanned medical record as documented by the patient. The review is negative with the exception of those things mentioned in the History of Present Illness and Past Medical History.       Past History:  Past Medical History:   Diagnosis Date    Arthritis     HIGH CHOLESTEROL     Hyperlipidemia     Hypertension     Vitamin D deficiency      Past Surgical History:   Procedure Laterality Date    CHOLECYSTECTOMY      ENDOMETRIAL ABLATION      FOOT SURGERY Right 2021    bunion, hammer toe, metatarsal surgery    FOOT SURGERY Left     bunion and metatarsal    VENTRAL HERNIA REPAIR       Current Outpatient Medications on File Prior to Visit   Medication Sig Dispense Refill    meloxicam (MOBIC) 15 MG tablet Take 1

## 2023-12-06 DIAGNOSIS — E55.9 VITAMIN D DEFICIENCY: ICD-10-CM

## 2023-12-07 NOTE — TELEPHONE ENCOUNTER
Medication:   Requested Prescriptions     Pending Prescriptions Disp Refills    Cholecalciferol (VITAMIN D3) 25 MCG (1000 UT) CAPS [Pharmacy Med Name: VITAMIN D3 (CHOLECAL) 1,000 IU SFGL] 120 capsule 0     Sig: TAKE FOUR CAPSULES BY MOUTH DAILY        Last Filled:      Patient Phone Number: [phone] (home) [phone] (work)    Last appt: 4/6/2022   Next appt: Visit date not found    Last OARRS:        No data to display
Infectious Disease
Gastroenterology

## 2024-02-07 DIAGNOSIS — E55.9 VITAMIN D DEFICIENCY: ICD-10-CM

## 2024-02-20 NOTE — FLOWSHEET NOTE
Exercises/Interventions:   ROM/stretches     SKTC     DKTC     Prayer stretch     Supine HS 30 sec x 3 Added 7/23   Prone quad stretch 30 sec x 3 Added 7/23   Pelvic tilt     Hook lying rotation at 90/90 10 x 3 ^8/6   Cat and camel          Strengthening     TA activation 10 sec x 10  Added 7/23   Ta activation with marches 15 x 1 Added 7/25   Supine SLR 10 x 3 #1 ^8/6   Hip ABD SLR 10 x 3 #1 ^8/6   Hip ext SLR 10 x 3 #1 ^8/6   Bridges 10 sec x 10, red loop TB  ^8/15   clamshells 10 x 3, red loop TB  ^8/6   bicycle 10 x 3 Added 7/30   Quadruped alternate LE reaches 10 x 3 child pose between each set  Added 7/30   Quadruped alternate UE/LE reaches     Sit to stands with TA activation 10 x 2 Added 8/1   Side steps 35' x  6, red loop TB  ^8/21   SLS 20 sec x 3 each LE Added 8/6   Modified side plank lifts 10 x 2 bilaterally Added 8/8   Tband lat pulls     Tband rows       Manual Intervention             Prone PA      GISTM/STM      Lumbar Manip      SI Manip      Hip belt mobs      Hip LA distraction              Therapeutic Exercise and NMR EXR  [x] (54503) Provided verbal/tactile cueing for activities related to strengthening, flexibility, endurance, ROM  for improvements in proximal hip and core control with self care, mobility, lifting and ambulation.  [] (60633) Provided verbal/tactile cueing for activities related to improving balance, coordination, kinesthetic sense, posture, motor skill, proprioception  to assist with core control in self care, mobility, lifting, and ambulation.      Therapeutic Activities:    [] (64918 or 13951) Provided verbal/tactile cueing for activities related to improving balance, coordination, kinesthetic sense, posture, motor skill, proprioception and motor activation to allow for proper function  with self care and ADLs  [] (71418) Provided training and instruction to the patient for proper core and proximal hip recruitment and positioning with ambulation re-education     Home function. 2. Patient will demonstrate increased AROM to WNL, good LS mobility, good hip ROM to allow for proper joint functioning as indicated by patients Functional Deficits. 3. Patient will demonstrate an increase in bilateral hip (flex, ABD, and ext) strength to 4+/5 and improved core activation to allow for proper functional mobility as indicated by patients Functional Deficits. 4. Patient will return to ADLs and prolonged walking without increased symptoms or restriction. 5. Patient will report sleeping and putting shoes/socks on pain free. Progression Towards Functional goals:  [] Patient is progressing as expected towards functional goals listed. [] Progression is slowed due to complexities listed. [] Progression has been slowed due to co-morbidities. [x] Plan just implemented, too soon to assess goals progression  [] Other:     ASSESSMENT:      Treatment/Activity Tolerance:  [x] Patient tolerated treatment well [] Patient limited by fatique  [] Patient limited by pain  [] Patient limited by other medical complications  [] Other: Pt genie session well. She required min VCs for proper form with hip ABD SLRs. She genie increased reps with band walks pain free. She reported no soreness at the end of the session. 8/21    Patient education: Patient education on PT and plan of care including diagnosis, prognosis, treatment goals and options. Patient agrees with discussed POC and treatment and is aware of rehab process.   7/23    Prognosis: [x] Good [] Fair  [] Poor    Patient Requires Follow-up: [x] Yes  [] No    PLAN:   [] Continue per plan of care [] Alter current plan (see comments)  [x] Plan of care initiated [] Hold pending MD visit [] Discharge    Next Visit: progress note and mod oswestry     Electronically signed by: Lynne Mosley PT, DPT Home

## 2024-03-08 DIAGNOSIS — D64.9 MILD ANEMIA: ICD-10-CM

## 2024-03-08 LAB
FERRITIN SERPL IA-MCNC: 25.1 NG/ML (ref 15–150)
IRON SATN MFR SERPL: 28 % (ref 15–50)
IRON SERPL-MCNC: 111 UG/DL (ref 37–145)
TIBC SERPL-MCNC: 391 UG/DL (ref 260–445)
TSH SERPL DL<=0.005 MIU/L-ACNC: 1.92 UIU/ML (ref 0.27–4.2)

## 2024-03-09 LAB
PATH INTERP BLD-IMP: NORMAL
VIT B12 SERPL-MCNC: 767 PG/ML (ref 211–911)

## 2024-03-11 LAB
ALBUMIN SERPL ELPH-MCNC: 3.5 G/DL (ref 3.1–4.9)
ALPHA1 GLOB SERPL ELPH-MCNC: 0.3 G/DL (ref 0.2–0.4)
ALPHA2 GLOB SERPL ELPH-MCNC: 0.9 G/DL (ref 0.4–1.1)
B-GLOBULIN SERPL ELPH-MCNC: 1.4 G/DL (ref 0.9–1.6)
GAMMA GLOB SERPL ELPH-MCNC: 0.7 G/DL (ref 0.6–1.8)
PATH INTERP BLD-IMP: NORMAL
PROT SERPL-MCNC: 6.8 G/DL (ref 6.4–8.2)
SPE/IFE INTERPRETATION: NORMAL

## 2024-03-27 ENCOUNTER — OFFICE VISIT (OUTPATIENT)
Dept: ORTHOPEDIC SURGERY | Age: 54
End: 2024-03-27
Payer: COMMERCIAL

## 2024-03-27 VITALS — BODY MASS INDEX: 42.68 KG/M2 | WEIGHT: 250 LBS | HEIGHT: 64 IN

## 2024-03-27 DIAGNOSIS — M19.019 AC JOINT ARTHROPATHY: Primary | ICD-10-CM

## 2024-03-27 DIAGNOSIS — M25.511 STERNOCLAVICULAR JOINT PAIN, RIGHT: ICD-10-CM

## 2024-03-27 PROCEDURE — 99213 OFFICE O/P EST LOW 20 MIN: CPT | Performed by: ORTHOPAEDIC SURGERY

## 2024-03-27 PROCEDURE — 20610 DRAIN/INJ JOINT/BURSA W/O US: CPT | Performed by: ORTHOPAEDIC SURGERY

## 2024-03-27 NOTE — PROGRESS NOTES
periarticular soft tissue swelling.     2. Mild AC joint arthrosis.     Assessment:  Luzma Pederson is a 53 y.o. 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    Impression:  Encounter Diagnoses   Name Primary?    AC joint arthropathy Yes    Sternoclavicular joint pain, right        Office Procedures:  Orders Placed This Encounter   Procedures    34224 - ME DRAIN/INJECT LARGE JOINT/BURSA       Plan:   We discussed both operative as well as nonoperative interventions for when these underlying sternoclavicular and AC joint arthritis.  We feel that is most appropriate to proceed with continued nonoperative intervention.  She was subsequently provided steroid injection into her sternoclavicular joint today's appointment.  This was tolerated very well.  She may follow-up on an as-needed basis as symptoms dictate.    After discussing the risks and benefits of a corticosteroid injection, Luzma did state an understanding and gave verbal consent to proceed.  After cleansing the injection site with Chlora-prep and using aseptic techniques,  2 CC of Depo Medrol 40mg/ml and 2 CC of 1% lidocaine were injected in the right sternoclavicular joint. She  tolerated the procedure well with no immediate adverse sequelae after the injection.  A bandage was placed over the injection site. Appropriate post injections instructions were given to the patient.    Luzma Pederson will follow up in 6 weeks and/or as needed.  She was in agreement with this plan and all questions were answered to her satisfaction. She was encouraged to call with any questions.     3/27/2024  7:42 PM      Tariq Gongora,   Orthopaedic Sports Medicine Fellow    This dictation was performed with a verbal recognition program (DRAGON) and it was checked for errors.  It is possible that there are still dictated errors within this office note.  If so, please bring any errors to my attention for an addendum.

## 2024-03-28 RX ORDER — METHYLPREDNISOLONE ACETATE 40 MG/ML
80 INJECTION, SUSPENSION INTRA-ARTICULAR; INTRALESIONAL; INTRAMUSCULAR; SOFT TISSUE ONCE
Status: COMPLETED | OUTPATIENT
Start: 2024-03-28 | End: 2024-03-28

## 2024-03-28 RX ORDER — LIDOCAINE HYDROCHLORIDE 10 MG/ML
2 INJECTION, SOLUTION INFILTRATION; PERINEURAL ONCE
Status: COMPLETED | OUTPATIENT
Start: 2024-03-28 | End: 2024-03-28

## 2024-03-28 RX ADMIN — METHYLPREDNISOLONE ACETATE 80 MG: 40 INJECTION, SUSPENSION INTRA-ARTICULAR; INTRALESIONAL; INTRAMUSCULAR; SOFT TISSUE at 14:34

## 2024-03-28 RX ADMIN — LIDOCAINE HYDROCHLORIDE 2 ML: 10 INJECTION, SOLUTION INFILTRATION; PERINEURAL at 14:33

## 2024-04-08 DIAGNOSIS — E55.9 VITAMIN D DEFICIENCY: ICD-10-CM

## 2024-05-05 DIAGNOSIS — E55.9 VITAMIN D DEFICIENCY: ICD-10-CM

## 2024-05-06 NOTE — TELEPHONE ENCOUNTER
Medication:   Requested Prescriptions     Pending Prescriptions Disp Refills    vitamin D 25 MCG (1000 UT) CAPS [Pharmacy Med Name: VITAMIN D3 1,000 UNIT SOFTGEL] 120 capsule 0     Sig: TAKE FOUR CAPSULES BY MOUTH DAILY       Last Filled:      Patient Phone Number: [phone] (home) [phone] (work)    Last appt: 4/6/2022   Next appt: Visit date not found  Future Appointments   Date Time Provider Department Center   5/8/2024  9:30 AM Michel Boyd MD [facility]   6/13/2024  9:20 AM Sam Lazaro APRN - CNP [facility]   9/11/2024  2:00 PM Sam Lazaro APRN - CNP [facility]

## 2024-05-08 ENCOUNTER — OFFICE VISIT (OUTPATIENT)
Dept: ORTHOPEDIC SURGERY | Age: 54
End: 2024-05-08
Payer: COMMERCIAL

## 2024-05-08 VITALS — HEIGHT: 64 IN | BODY MASS INDEX: 42.68 KG/M2 | WEIGHT: 250 LBS

## 2024-05-08 DIAGNOSIS — M25.511 STERNOCLAVICULAR JOINT PAIN, RIGHT: ICD-10-CM

## 2024-05-08 DIAGNOSIS — M19.019 AC JOINT ARTHROPATHY: Primary | ICD-10-CM

## 2024-05-08 PROCEDURE — 99213 OFFICE O/P EST LOW 20 MIN: CPT | Performed by: ORTHOPAEDIC SURGERY

## 2024-05-08 NOTE — PROGRESS NOTES
Chief Complaint    Shoulder Pain (F/U RIGHT SHOULDER)      History of Present Illness:  Luzma Pederson is a pleasant, 53 y.o., female, here today for follow up of her right shoulder. 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 We did provide a corticosteroid injection to the SC joint at her last visit approximately 6 weeks ago. She does feel this provided significant relief to her shoulder. She does continue to have some pain with certain movements especially cross body-type movements. She does note some \"crunching\" type noise with these movements. She reports no new injuries or setbacks.     Pain Assessment  Location of Pain: Shoulder  Location Modifiers: Right  Severity of Pain: 1  Quality of Pain: Dull, Aching  Duration of Pain: Persistent  Frequency of Pain: Intermittent  Aggravating Factors:  (lifting, certain movements)  Limiting Behavior: Some  Relieving Factors: Rest, Ice  Work-Related Injury: No  Are there other pain locations you wish to document?: No      Medical History:  Patient's medications, allergies, past medical, surgical, social and family histories were reviewed and updated as appropriate.    No notes on file    Review of Systems  A 14 point review of systems was completed by the patient and is available in the media section of the scanned medical record and was reviewed on 5/8/2024.  The review is negative with the exception of those things mentioned in the HPI and Past Medical History    Vital Signs:  There were no vitals filed for this visit.    General/Appearance: Alert and oriented and in no apparent distress.    Skin:  There are no skin lesions, cellulitis, or extreme edema. The patient has warm and well-perfused Bilateral upper extremities with brisk capillary refill.      Right Shoulder Exam:  Inspection: No gross deformities, no signs of infection.    Palpation: Tenderness over both the AC joint and SC joint    Active Range of Motion:  Forward Elevation

## 2024-05-27 PROCEDURE — 99285 EMERGENCY DEPT VISIT HI MDM: CPT

## 2024-05-27 PROCEDURE — 93005 ELECTROCARDIOGRAM TRACING: CPT | Performed by: EMERGENCY MEDICINE

## 2024-05-27 ASSESSMENT — PAIN - FUNCTIONAL ASSESSMENT: PAIN_FUNCTIONAL_ASSESSMENT: NONE - DENIES PAIN

## 2024-05-28 ENCOUNTER — HOSPITAL ENCOUNTER (EMERGENCY)
Age: 54
Discharge: HOME | End: 2024-05-28
Attending: EMERGENCY MEDICINE
Payer: COMMERCIAL

## 2024-05-28 ENCOUNTER — APPOINTMENT (OUTPATIENT)
Dept: GENERAL RADIOLOGY | Age: 54
End: 2024-05-28
Payer: COMMERCIAL

## 2024-05-28 VITALS
HEIGHT: 64 IN | WEIGHT: 252.43 LBS | SYSTOLIC BLOOD PRESSURE: 151 MMHG | OXYGEN SATURATION: 100 % | TEMPERATURE: 97.8 F | RESPIRATION RATE: 15 BRPM | BODY MASS INDEX: 43.1 KG/M2 | HEART RATE: 80 BPM | DIASTOLIC BLOOD PRESSURE: 76 MMHG

## 2024-05-28 DIAGNOSIS — R00.2 PALPITATIONS: Primary | ICD-10-CM

## 2024-05-28 LAB
ALBUMIN SERPL-MCNC: 4.1 G/DL (ref 3.4–5)
ALBUMIN/GLOB SERPL: 1.4 {RATIO} (ref 1.1–2.2)
ALP SERPL-CCNC: 91 U/L (ref 40–129)
ALT SERPL-CCNC: 24 U/L (ref 10–40)
ANION GAP SERPL CALCULATED.3IONS-SCNC: 12 MMOL/L (ref 3–16)
AST SERPL-CCNC: 17 U/L (ref 15–37)
BASOPHILS # BLD: 0.1 K/UL (ref 0–0.2)
BASOPHILS NFR BLD: 0.6 %
BILIRUB SERPL-MCNC: <0.2 MG/DL (ref 0–1)
BUN SERPL-MCNC: 34 MG/DL (ref 7–20)
CALCIUM SERPL-MCNC: 9.5 MG/DL (ref 8.3–10.6)
CHLORIDE SERPL-SCNC: 100 MMOL/L (ref 99–110)
CO2 SERPL-SCNC: 28 MMOL/L (ref 21–32)
CREAT SERPL-MCNC: 0.8 MG/DL (ref 0.6–1.1)
DEPRECATED RDW RBC AUTO: 14.1 % (ref 12.4–15.4)
EOSINOPHIL # BLD: 0.1 K/UL (ref 0–0.6)
EOSINOPHIL NFR BLD: 0.8 %
GFR SERPLBLD CREATININE-BSD FMLA CKD-EPI: 86 ML/MIN/{1.73_M2}
GLUCOSE SERPL-MCNC: 116 MG/DL (ref 70–99)
HCG SERPL QL: NEGATIVE
HCT VFR BLD AUTO: 35.4 % (ref 36–48)
HGB BLD-MCNC: 12.1 G/DL (ref 12–16)
LYMPHOCYTES # BLD: 2.7 K/UL (ref 1–5.1)
LYMPHOCYTES NFR BLD: 21.8 %
MCH RBC QN AUTO: 29.7 PG (ref 26–34)
MCHC RBC AUTO-ENTMCNC: 34.1 G/DL (ref 31–36)
MCV RBC AUTO: 86.9 FL (ref 80–100)
MONOCYTES # BLD: 1.1 K/UL (ref 0–1.3)
MONOCYTES NFR BLD: 8.4 %
NEUTROPHILS # BLD: 8.6 K/UL (ref 1.7–7.7)
NEUTROPHILS NFR BLD: 68.4 %
PLATELET # BLD AUTO: 377 K/UL (ref 135–450)
PMV BLD AUTO: 8.9 FL (ref 5–10.5)
POTASSIUM SERPL-SCNC: 4.1 MMOL/L (ref 3.5–5.1)
PROT SERPL-MCNC: 7.1 G/DL (ref 6.4–8.2)
RBC # BLD AUTO: 4.07 M/UL (ref 4–5.2)
SODIUM SERPL-SCNC: 140 MMOL/L (ref 136–145)
TROPONIN, HIGH SENSITIVITY: 9 NG/L (ref 0–14)
WBC # BLD AUTO: 12.6 K/UL (ref 4–11)

## 2024-05-28 PROCEDURE — 85025 COMPLETE CBC W/AUTO DIFF WBC: CPT

## 2024-05-28 PROCEDURE — 84484 ASSAY OF TROPONIN QUANT: CPT

## 2024-05-28 PROCEDURE — 71046 X-RAY EXAM CHEST 2 VIEWS: CPT

## 2024-05-28 PROCEDURE — 84703 CHORIONIC GONADOTROPIN ASSAY: CPT

## 2024-05-28 PROCEDURE — 80053 COMPREHEN METABOLIC PANEL: CPT

## 2024-05-28 PROCEDURE — 36415 COLL VENOUS BLD VENIPUNCTURE: CPT

## 2024-05-28 NOTE — ED PROVIDER NOTES
metatarsal    VENTRAL HERNIA REPAIR        CURRENTMEDICATIONS       Previous Medications    CALCIUM CARB-CHOLECALCIFEROL (CALTRATE 600+D3 PO)        FERROUS SULFATE (IRON 325) 325 (65 FE) MG TABLET    Take 1 tablet by mouth every other day    LISINOPRIL (PRINIVIL;ZESTRIL) 10 MG TABLET    Take 1 tablet by mouth daily    MELOXICAM (MOBIC) 15 MG TABLET    TAKE 1 TABLET BY MOUTH DAILY AS NEEDED FOR PAIN    MULTIPLE VITAMINS-MINERALS (ONE DAILY MULTIVITAMIN WOMEN) TABS        ROSUVASTATIN (CRESTOR) 10 MG TABLET    Take 1 tablet by mouth nightly    VITAMIN D 25 MCG (1000 UT) CAPS    TAKE FOUR CAPSULES BY MOUTH DAILY      SCREENINGS          Danny Coma Scale  Eye Opening: Spontaneous  Best Verbal Response: Oriented  Best Motor Response: Obeys commands  [place] Coma Scale Score: 15                CIWA Assessment  BP: (!) 154/91  Pulse: 79                  PHYSICAL EXAM :  ED Triage Vitals [05/27/24 2341]   BP Temp Temp src Pulse Respirations SpO2 Height Weight - Scale   (!) 176/79 97.8 °F (36.6 °C) -- 84 16 100 % 1.626 m (5' 4\") 114.5 kg (252 lb 6.8 oz)      GENERAL APPEARANCE: Awake and alert. Cooperative. No acute distress.  HEAD: Normocephalic. Atraumatic.  EYES: PERRL. EOM's grossly intact.   ENT: Mucous membranes are moist.   NECK: Supple, trachea midline.   HEART: RRR. Normal S1, S2. No murmurs, rubs or gallops.   LUNGS: Respirations unlabored. CTAB. Good air exchange. No wheezes, rales, or rhonchi.  Speaking comfortably in full sentences.   ABDOMEN: Soft. Non-distended. Non-tender. No guarding or rebound. Normal Bowel sounds.  EXTREMITIES: No peripheral edema. MAEE. No acute deformities.  SKIN: Warm and dry. No acute rashes.   NEUROLOGICAL: Alert and oriented X 3. CN II-XII intact. No gross facial drooping.  Strength 5/5 in all extremities.  Sensation intact.  GCS 15, normal gait  PSYCHIATRIC: Normal mood and affect.    DIAGNOSTIC RESULTS     LABS:   Labs Reviewed   CBC WITH AUTO DIFFERENTIAL - Abnormal; Notable  60482 - Inpatient Low Complexity 25183 - Inpatient Moderate Complexity

## 2024-05-28 NOTE — DISCHARGE INSTRUCTIONS
Thank you for visiting [facility] Emergency Department.    You need to call in morning to make appointment as directed with appropriate doctor.    Should you have any questions regarding your care or further treatment, please call [facility] Emergency Department at [phone].    Take any medications as prescribed, if given any, otherwise for pain Use ibuprofen or Tylenol (unless prescribed medications that have Tylenol in it).  You can take over the counter Ibuprofen (advil) tablets (4 tablets every 8 hours or 3 tablets every 6 hours or 2 tablets every 4 hours)    Return to ED if symptoms worsen, do not improve, fever > 101.5, excessive nausea or vomiting, and unable to follow up with your physician, or any other care or concern.

## 2024-05-28 NOTE — PROGRESS NOTES
Removed PIV. VSS. Reviewed AVS with patient. No further questions at this time. Patient ambulated independently to lobby.

## 2024-05-28 NOTE — ED TRIAGE NOTES
Pt presents to ED with c/o heart palpitations. Pt states she feels like her heart is \"fluttering\". Pt reports taking oral steroids for bronchitis. Pt denies chest pain or SOB. Pt denies hx of cardiac issues. Pt is alert and oriented; VSS.

## 2024-05-29 LAB
EKG ATRIAL RATE: 82 BPM
EKG DIAGNOSIS: NORMAL
EKG P AXIS: 29 DEGREES
EKG P-R INTERVAL: 134 MS
EKG Q-T INTERVAL: 366 MS
EKG QRS DURATION: 74 MS
EKG QTC CALCULATION (BAZETT): 427 MS
EKG R AXIS: 17 DEGREES
EKG T AXIS: 35 DEGREES
EKG VENTRICULAR RATE: 82 BPM

## 2024-05-29 PROCEDURE — 93010 ELECTROCARDIOGRAM REPORT: CPT | Performed by: INTERNAL MEDICINE

## 2024-05-30 ENCOUNTER — HOSPITAL ENCOUNTER (OUTPATIENT)
Dept: WOMENS IMAGING | Age: 54
Discharge: HOME | End: 2024-05-30
Payer: COMMERCIAL

## 2024-05-30 VITALS — HEIGHT: 64 IN | BODY MASS INDEX: 42.68 KG/M2 | WEIGHT: 250 LBS

## 2024-05-30 DIAGNOSIS — Z12.31 SCREENING MAMMOGRAM, ENCOUNTER FOR: ICD-10-CM

## 2024-05-30 PROCEDURE — 77063 BREAST TOMOSYNTHESIS BI: CPT

## 2024-06-02 DIAGNOSIS — M51.36 DDD (DEGENERATIVE DISC DISEASE), LUMBAR: ICD-10-CM

## 2024-06-02 DIAGNOSIS — E55.9 VITAMIN D DEFICIENCY: ICD-10-CM

## 2024-06-03 RX ORDER — MELOXICAM 15 MG/1
15 TABLET ORAL PRN
Qty: 30 TABLET | Refills: 2 | Status: SHIPPED | OUTPATIENT
Start: 2024-06-03

## 2024-07-17 ENCOUNTER — OFFICE VISIT (OUTPATIENT)
Dept: ORTHOPEDIC SURGERY | Age: 54
End: 2024-07-17

## 2024-07-17 VITALS — WEIGHT: 256 LBS | HEIGHT: 64 IN | BODY MASS INDEX: 43.71 KG/M2

## 2024-07-17 DIAGNOSIS — M19.011 ARTHRITIS OF RIGHT STERNOCLAVICULAR JOINT: ICD-10-CM

## 2024-07-17 DIAGNOSIS — M19.019 AC JOINT ARTHROPATHY: Primary | ICD-10-CM

## 2024-07-17 DIAGNOSIS — M25.511 STERNOCLAVICULAR JOINT PAIN, RIGHT: ICD-10-CM

## 2024-07-17 RX ORDER — METHYLPREDNISOLONE ACETATE 40 MG/ML
80 INJECTION, SUSPENSION INTRA-ARTICULAR; INTRALESIONAL; INTRAMUSCULAR; SOFT TISSUE ONCE
Status: COMPLETED | OUTPATIENT
Start: 2024-07-17 | End: 2024-07-17

## 2024-07-17 RX ORDER — LIDOCAINE HYDROCHLORIDE 10 MG/ML
2 INJECTION, SOLUTION INFILTRATION; PERINEURAL ONCE
Status: COMPLETED | OUTPATIENT
Start: 2024-07-17 | End: 2024-07-17

## 2024-07-17 RX ADMIN — LIDOCAINE HYDROCHLORIDE 2 ML: 10 INJECTION, SOLUTION INFILTRATION; PERINEURAL at 10:58

## 2024-07-17 RX ADMIN — METHYLPREDNISOLONE ACETATE 80 MG: 40 INJECTION, SUSPENSION INTRA-ARTICULAR; INTRALESIONAL; INTRAMUSCULAR; SOFT TISSUE at 10:59

## 2024-07-17 NOTE — PROGRESS NOTES
[facility]  History and Physical  Shoulder Pain    Date:  2024    Name:  Luzma Pederson  Address:  [address]  [facility] [address]    :  1970      Age:   54 y.o.    SSN:  [id number]      Medical Record Number:  [id number]    Reason for Visit:    Shoulder Pain (RIGHT SHOULDER)      HPI:   Luzma Pederson is a 54 y.o. female who presents to our office today for follow-up of her right shoulder pain secondary to SC joint arthritis as well as AC joint arthritis.  She has been seen and treated by Dr. Boyd for this several times over the last year and has received 3 injections overall, 2 by Dr. Boyd himself.  She states that she gets relatively good relief from these injections with them lasting around 3 months.  Her last injection was 3 months ago and she is here today for repeat injection.  She continues to have pain due to her shoulder and specifically describes tying her shoes, sleeping at night on her side and reaching overhead.      Pain Assessment  Location of Pain: Shoulder  Location Modifiers: Right  Severity of Pain: 0  Quality of Pain: Sharp, Throbbing, Dull, Aching  Frequency of Pain: Intermittent  Aggravating Factors: Other (Comment), Stretching, Straightening  Limiting Behavior: Some  Relieving Factors: Rest, Other (Comment), Ice  Work-Related Injury: No  Are there other pain locations you wish to document?: No    No notes on file    Review of Systems:  A 14 point review of systems available in the scanned medical record as documented by the patient and reviewed on 2024.  The review is negative with the exception of those things mentioned in the History of Present Illness and Past Medical History.      Past Medical History:  Patient's medications, allergies, past medical, surgical, social and family histories were reviewed and updated as appropriate.    Allergies:  Allergies   Allergen Reactions    Tetanus Toxoids Other (See

## 2024-09-11 ENCOUNTER — OFFICE VISIT (OUTPATIENT)
Dept: ORTHOPEDIC SURGERY | Age: 54
End: 2024-09-11
Payer: COMMERCIAL

## 2024-09-11 VITALS — BODY MASS INDEX: 43.71 KG/M2 | HEIGHT: 64 IN | WEIGHT: 256 LBS

## 2024-09-11 DIAGNOSIS — M19.011: ICD-10-CM

## 2024-09-11 DIAGNOSIS — M67.912 DISORDER OF LEFT ROTATOR CUFF: ICD-10-CM

## 2024-09-11 DIAGNOSIS — M25.511 ACUTE PAIN OF RIGHT SHOULDER: Primary | ICD-10-CM

## 2024-09-11 PROCEDURE — 99214 OFFICE O/P EST MOD 30 MIN: CPT | Performed by: ORTHOPAEDIC SURGERY

## 2024-09-25 ENCOUNTER — OFFICE VISIT (OUTPATIENT)
Dept: ORTHOPEDIC SURGERY | Age: 54
End: 2024-09-25

## 2024-09-25 VITALS — BODY MASS INDEX: 43.71 KG/M2 | WEIGHT: 256 LBS | HEIGHT: 64 IN

## 2024-09-25 DIAGNOSIS — M75.42 IMPINGEMENT SYNDROME OF LEFT SHOULDER: ICD-10-CM

## 2024-09-25 DIAGNOSIS — M19.019 AC JOINT ARTHROPATHY: ICD-10-CM

## 2024-09-25 DIAGNOSIS — M19.011: ICD-10-CM

## 2024-09-25 DIAGNOSIS — M25.511 STERNOCLAVICULAR JOINT PAIN, RIGHT: ICD-10-CM

## 2024-09-25 DIAGNOSIS — M19.011 ARTHRITIS OF RIGHT STERNOCLAVICULAR JOINT: Primary | ICD-10-CM

## 2024-09-25 DIAGNOSIS — M67.912 DISORDER OF LEFT ROTATOR CUFF: ICD-10-CM

## 2024-09-30 ENCOUNTER — TELEPHONE (OUTPATIENT)
Dept: ORTHOPEDIC SURGERY | Age: 54
End: 2024-09-30

## 2024-10-01 NOTE — TELEPHONE ENCOUNTER
Surgery and/or Procedure Scheduling     Contact Name: Luzma Pederson   Surgical/Procedure Request: RETURNING DANIAL'S CALL.  Patient Contact Number: [phone]

## 2024-10-02 ENCOUNTER — TELEPHONE (OUTPATIENT)
Dept: ORTHOPEDIC SURGERY | Age: 54
End: 2024-10-02

## 2024-10-02 NOTE — TELEPHONE ENCOUNTER
Medical Facility Question     Facility Name: [facility]   Contact Name: Belinda   Contact Number: [phone]   Request or Information: Belinda called she would like to know what kind of Tests do this patient need for her upcoming sx like EKG or something else. Belinda stated you can call her or teams her. Please Advise.

## 2024-10-03 ENCOUNTER — PREP FOR PROCEDURE (OUTPATIENT)
Dept: ORTHOPEDIC SURGERY | Age: 54
End: 2024-10-03

## 2024-10-03 DIAGNOSIS — M25.511 STERNOCLAVICULAR JOINT PAIN, RIGHT: Primary | ICD-10-CM

## 2024-10-03 DIAGNOSIS — M19.011 ARTHRITIS OF RIGHT SHOULDER REGION: ICD-10-CM

## 2024-10-07 DIAGNOSIS — M25.511 STERNOCLAVICULAR JOINT PAIN, RIGHT: Primary | ICD-10-CM

## 2024-10-11 ENCOUNTER — TELEPHONE (OUTPATIENT)
Dept: ORTHOPEDIC SURGERY | Age: 54
End: 2024-10-11

## 2024-10-14 DIAGNOSIS — M51.369 DDD (DEGENERATIVE DISC DISEASE), LUMBAR: ICD-10-CM

## 2024-10-14 DIAGNOSIS — E55.9 VITAMIN D DEFICIENCY: ICD-10-CM

## 2024-10-14 RX ORDER — MELOXICAM 15 MG/1
15 TABLET ORAL PRN
Qty: 30 TABLET | Refills: 2 | OUTPATIENT
Start: 2024-10-14

## 2024-10-21 ENCOUNTER — TELEPHONE (OUTPATIENT)
Dept: ORTHOPEDIC SURGERY | Age: 54
End: 2024-10-21

## 2024-10-21 NOTE — TELEPHONE ENCOUNTER
Other PATIENT CALLED TO REQUEST A CALL BACK TO DISCUSS THE ISSUES WITH HER INSURANCE AND THE SURGERY. PLEASE CALL TO ASSIST [phone]

## 2024-10-23 ENCOUNTER — TELEPHONE (OUTPATIENT)
Dept: ORTHOPEDIC SURGERY | Age: 54
End: 2024-10-23

## 2024-10-23 NOTE — TELEPHONE ENCOUNTER
General Question     Subject: SURGERY   Patient and /or Facility Request: Luzma Pederson   Contact Number: [phone]     PATIENT CALLED REQ TO SPEAK WITH DANIAL REGARDING APPROVAL ON HER SURGERY SHE HAS SCHEDULED WITH DR GENAO     PLEASE CALL PATIENT BACK AT THE ABOVE NUMBER

## 2024-10-28 NOTE — PROGRESS NOTES
[facility] PRE-SURGICAL TESTING INSTRUCTIONS                      PRIOR TO PROCEDURE DATE:    1. PLEASE FOLLOW ANY INSTRUCTIONS GIVEN TO YOU PER YOUR SURGEON.      2. Arrange for someone to drive you home and be with you for the first 24 hours after discharge for your safety after your procedure for which you received sedation. Ensure it is someone we can share information with regarding your discharge.     NOTE: At this time ONLY 2 ADULTS may accompany you   One person ENCOURAGED to stay at hospital entire time if outpatient surgery      3. You must contact your surgeon for instructions IF:  You are taking any blood thinners, aspirin, anti-inflammatory or vitamins.  There is a change in your physical condition such as a cold, fever, rash, cuts, sores, or any other infection, especially near your surgical site.    4. Do not drink alcohol the day before or day of your procedure.  Do not use any recreational marijuana at least 24 hours or street drugs (heroin, cocaine) at minimum 5 days prior to your procedure.     5. A Pre-Surgical History and Physical MUST be completed WITHIN 30 DAYS OR LESS prior to your procedure.by your Physician or an Urgent Care        THE DAY OF YOUR PROCEDURE:  1.  Follow instructions for ARRIVAL TIME as DIRECTED BY YOUR SURGEON.     2. Enter the MAIN entrance from [facility] and follow the signs to the free Parking Garage or  Parking (offered free of charge 7 am-5pm).      3. Enter the Main Entrance of the hospital (do not enter from the lower level of the parking garage). Upon entrance, check in with the  at the surgical information desk on your LEFT.   Bring your insurance card and photo ID to register      4. DO NOT EAT ANYTHING 8 hours prior to arrival for surgery.  You may have up to 8 ounces of water 4 hours prior to your arrival for surgery.   NOTE: ALL Gastric, Bariatric & Bowel surgery patients - you MUST follow your surgeon's instructions regarding  extreme drowsiness, changes in your vital signs or breathing patterns. Nausea, headache, muscle aches, or sore throat may also occur after anesthesia.  Your nurse will help you manage these potential side effects.    2. For comfort and safety, arrange to have someone at home with you for the first 24 hours after discharge.    3. You and your family will be given written instructions about your diet, activity, dressing care, medications, and return visits.     4. Once at home, should issues with nausea, pain, or bleeding occur, or should you notice any signs of infection, you should call your surgeon.    5. Always clean your hands before and after caring for your wound. Do not let your family touch your surgery site without cleaning their hands.     6. Narcotic pain medications can cause significant constipation.  You may want to add a stool softener to your postoperative medication schedule or speak to your surgeon on how best to manage this SIDE EFFECT.    SPECIAL INSTRUCTIONS; Bring case for eyeglasses and brace/sling on DOS. Pt states understanding of pre-op instructions.     Thank you for allowing us to care for you.  We strive to exceed your expectations in the delivery of care and service provided to you and your family.     If you need to contact the Pre-Admission Testing staff for any reason, please call us at [phone]    Instructions reviewed with patient during preadmission testing phone interview.  Tali Pickett RN.10/28/2024 .9:46 AM      ADDITIONAL EDUCATIONAL INFORMATION REVIEWED PER PHONE WITH YOU AND/OR YOUR FAMILY:  Yes Antibacterial Soap- Pt instructed to use Antibacterial Soap ( Dial or Safeguard) 2-3 times prior to surgery.

## 2024-11-01 ENCOUNTER — ANESTHESIA (OUTPATIENT)
Dept: OPERATING ROOM | Age: 54
End: 2024-11-01
Payer: COMMERCIAL

## 2024-11-01 ENCOUNTER — ANESTHESIA EVENT (OUTPATIENT)
Dept: OPERATING ROOM | Age: 54
End: 2024-11-01
Payer: COMMERCIAL

## 2024-11-01 ENCOUNTER — TELEPHONE (OUTPATIENT)
Dept: ORTHOPEDIC SURGERY | Age: 54
End: 2024-11-01

## 2024-11-01 ENCOUNTER — HOSPITAL ENCOUNTER (OUTPATIENT)
Age: 54
Setting detail: OUTPATIENT SURGERY
Discharge: HOME | End: 2024-11-01
Attending: ORTHOPAEDIC SURGERY | Admitting: ORTHOPAEDIC SURGERY
Payer: COMMERCIAL

## 2024-11-01 VITALS
DIASTOLIC BLOOD PRESSURE: 68 MMHG | WEIGHT: 260 LBS | TEMPERATURE: 97 F | HEART RATE: 96 BPM | OXYGEN SATURATION: 94 % | BODY MASS INDEX: 44.39 KG/M2 | HEIGHT: 64 IN | RESPIRATION RATE: 17 BRPM | SYSTOLIC BLOOD PRESSURE: 156 MMHG

## 2024-11-01 DIAGNOSIS — Z98.890 S/P SHOULDER SURGERY: Primary | ICD-10-CM

## 2024-11-01 LAB
ABO + RH BLD: NORMAL
BLD GP AB SCN SERPL QL: NORMAL

## 2024-11-01 PROCEDURE — 3600000004 HC SURGERY LEVEL 4 BASE: Performed by: ORTHOPAEDIC SURGERY

## 2024-11-01 PROCEDURE — 86900 BLOOD TYPING SEROLOGIC ABO: CPT

## 2024-11-01 PROCEDURE — 2580000003 HC RX 258

## 2024-11-01 PROCEDURE — 76942 ECHO GUIDE FOR BIOPSY: CPT | Performed by: ANESTHESIOLOGY

## 2024-11-01 PROCEDURE — 2580000003 HC RX 258: Performed by: ORTHOPAEDIC SURGERY

## 2024-11-01 PROCEDURE — 86850 RBC ANTIBODY SCREEN: CPT

## 2024-11-01 PROCEDURE — L3660 SO 8 AB RSTR CAN/WEB PRE OTS: HCPCS | Performed by: ORTHOPAEDIC SURGERY

## 2024-11-01 PROCEDURE — 7100000001 HC PACU RECOVERY - ADDTL 15 MIN: Performed by: ORTHOPAEDIC SURGERY

## 2024-11-01 PROCEDURE — 2500000003 HC RX 250 WO HCPCS: Performed by: NURSE ANESTHETIST, CERTIFIED REGISTERED

## 2024-11-01 PROCEDURE — 3700000000 HC ANESTHESIA ATTENDED CARE: Performed by: ORTHOPAEDIC SURGERY

## 2024-11-01 PROCEDURE — 3700000001 HC ADD 15 MINUTES (ANESTHESIA): Performed by: ORTHOPAEDIC SURGERY

## 2024-11-01 PROCEDURE — 3600000014 HC SURGERY LEVEL 4 ADDTL 15MIN: Performed by: ORTHOPAEDIC SURGERY

## 2024-11-01 PROCEDURE — 6360000002 HC RX W HCPCS: Performed by: NURSE ANESTHETIST, CERTIFIED REGISTERED

## 2024-11-01 PROCEDURE — C1713 ANCHOR/SCREW BN/BN,TIS/BN: HCPCS | Performed by: ORTHOPAEDIC SURGERY

## 2024-11-01 PROCEDURE — 6360000002 HC RX W HCPCS: Performed by: PHYSICIAN ASSISTANT

## 2024-11-01 PROCEDURE — 2580000003 HC RX 258: Performed by: PHYSICIAN ASSISTANT

## 2024-11-01 PROCEDURE — 2720000010 HC SURG SUPPLY STERILE: Performed by: ORTHOPAEDIC SURGERY

## 2024-11-01 PROCEDURE — 64415 NJX AA&/STRD BRCH PLXS IMG: CPT | Performed by: ANESTHESIOLOGY

## 2024-11-01 PROCEDURE — 6360000002 HC RX W HCPCS: Performed by: ORTHOPAEDIC SURGERY

## 2024-11-01 PROCEDURE — 86901 BLOOD TYPING SEROLOGIC RH(D): CPT

## 2024-11-01 PROCEDURE — 2709999900 HC NON-CHARGEABLE SUPPLY: Performed by: ORTHOPAEDIC SURGERY

## 2024-11-01 PROCEDURE — 2500000003 HC RX 250 WO HCPCS

## 2024-11-01 PROCEDURE — 6360000002 HC RX W HCPCS

## 2024-11-01 PROCEDURE — 2580000003 HC RX 258: Performed by: ANESTHESIOLOGY

## 2024-11-01 PROCEDURE — 6370000000 HC RX 637 (ALT 250 FOR IP)

## 2024-11-01 PROCEDURE — 6360000002 HC RX W HCPCS: Performed by: ANESTHESIOLOGY

## 2024-11-01 PROCEDURE — 7100000010 HC PHASE II RECOVERY - FIRST 15 MIN: Performed by: ORTHOPAEDIC SURGERY

## 2024-11-01 PROCEDURE — 7100000011 HC PHASE II RECOVERY - ADDTL 15 MIN: Performed by: ORTHOPAEDIC SURGERY

## 2024-11-01 PROCEDURE — 7100000000 HC PACU RECOVERY - FIRST 15 MIN: Performed by: ORTHOPAEDIC SURGERY

## 2024-11-01 PROCEDURE — A4217 STERILE WATER/SALINE, 500 ML: HCPCS | Performed by: ORTHOPAEDIC SURGERY

## 2024-11-01 PROCEDURE — C9290 INJ, BUPIVACAINE LIPOSOME: HCPCS | Performed by: ANESTHESIOLOGY

## 2024-11-01 PROCEDURE — 6370000000 HC RX 637 (ALT 250 FOR IP): Performed by: ANESTHESIOLOGY

## 2024-11-01 DEVICE — HEALICOIL RG SA 5.5MM W/3 UB-BL CB                                    BL BK
Type: IMPLANTABLE DEVICE | Site: SHOULDER | Status: FUNCTIONAL
Brand: HEALICOIL / ULTRABRAID

## 2024-11-01 DEVICE — ANCHOR SUTURE BIOCOMP 4.75X19.1 MM SWIVELOCK C: Type: IMPLANTABLE DEVICE | Site: SHOULDER | Status: FUNCTIONAL

## 2024-11-01 RX ORDER — ROCURONIUM BROMIDE 10 MG/ML
INJECTION, SOLUTION INTRAVENOUS
Status: DISCONTINUED | OUTPATIENT
Start: 2024-11-01 | End: 2024-11-01 | Stop reason: SDUPTHER

## 2024-11-01 RX ORDER — ASPIRIN 325 MG
325 TABLET ORAL DAILY
Qty: 14 TABLET | Refills: 0 | Status: SHIPPED | OUTPATIENT
Start: 2024-11-01 | End: 2024-11-15

## 2024-11-01 RX ORDER — PROPOFOL 10 MG/ML
INJECTION, EMULSION INTRAVENOUS
Status: DISCONTINUED | OUTPATIENT
Start: 2024-11-01 | End: 2024-11-01 | Stop reason: SDUPTHER

## 2024-11-01 RX ORDER — KETAMINE HCL IN NACL, ISO-OSM 20 MG/2 ML
SYRINGE (ML) INJECTION
Status: DISCONTINUED | OUTPATIENT
Start: 2024-11-01 | End: 2024-11-01 | Stop reason: SDUPTHER

## 2024-11-01 RX ORDER — SODIUM CHLORIDE 0.9 % (FLUSH) 0.9 %
5-40 SYRINGE (ML) INJECTION PRN
Status: DISCONTINUED | OUTPATIENT
Start: 2024-11-01 | End: 2024-11-01 | Stop reason: HOSPADM

## 2024-11-01 RX ORDER — SODIUM CHLORIDE 0.9 % (FLUSH) 0.9 %
5-40 SYRINGE (ML) INJECTION EVERY 12 HOURS SCHEDULED
Status: DISCONTINUED | OUTPATIENT
Start: 2024-11-01 | End: 2024-11-01 | Stop reason: HOSPADM

## 2024-11-01 RX ORDER — OXYCODONE AND ACETAMINOPHEN 5; 325 MG/1; MG/1
1 TABLET ORAL
Qty: 28 TABLET | Refills: 0 | Status: SHIPPED | OUTPATIENT
Start: 2024-11-01 | End: 2024-11-08

## 2024-11-01 RX ORDER — HYDROMORPHONE HYDROCHLORIDE 1 MG/ML
0.5 INJECTION, SOLUTION INTRAMUSCULAR; INTRAVENOUS; SUBCUTANEOUS EVERY 5 MIN PRN
Status: DISCONTINUED | OUTPATIENT
Start: 2024-11-01 | End: 2024-11-01 | Stop reason: HOSPADM

## 2024-11-01 RX ORDER — HYDROMORPHONE HYDROCHLORIDE 2 MG/ML
INJECTION, SOLUTION INTRAMUSCULAR; INTRAVENOUS; SUBCUTANEOUS
Status: DISCONTINUED | OUTPATIENT
Start: 2024-11-01 | End: 2024-11-01 | Stop reason: SDUPTHER

## 2024-11-01 RX ORDER — MIDAZOLAM HYDROCHLORIDE 1 MG/ML
INJECTION, SOLUTION INTRAMUSCULAR; INTRAVENOUS
Status: COMPLETED
Start: 2024-11-01 | End: 2024-11-01

## 2024-11-01 RX ORDER — PROCHLORPERAZINE EDISYLATE 5 MG/ML
5 INJECTION INTRAMUSCULAR; INTRAVENOUS
Status: DISCONTINUED | OUTPATIENT
Start: 2024-11-01 | End: 2024-11-01 | Stop reason: HOSPADM

## 2024-11-01 RX ORDER — LIDOCAINE HYDROCHLORIDE 20 MG/ML
INJECTION, SOLUTION INTRAVENOUS
Status: DISCONTINUED | OUTPATIENT
Start: 2024-11-01 | End: 2024-11-01 | Stop reason: SDUPTHER

## 2024-11-01 RX ORDER — SODIUM CHLORIDE 9 MG/ML
INJECTION, SOLUTION INTRAVENOUS PRN
Status: DISCONTINUED | OUTPATIENT
Start: 2024-11-01 | End: 2024-11-01 | Stop reason: HOSPADM

## 2024-11-01 RX ORDER — LABETALOL HYDROCHLORIDE 5 MG/ML
10 INJECTION, SOLUTION INTRAVENOUS
Status: DISCONTINUED | OUTPATIENT
Start: 2024-11-01 | End: 2024-11-01 | Stop reason: HOSPADM

## 2024-11-01 RX ORDER — DEXAMETHASONE SODIUM PHOSPHATE 4 MG/ML
INJECTION, SOLUTION INTRA-ARTICULAR; INTRALESIONAL; INTRAMUSCULAR; INTRAVENOUS; SOFT TISSUE
Status: DISCONTINUED | OUTPATIENT
Start: 2024-11-01 | End: 2024-11-01 | Stop reason: SDUPTHER

## 2024-11-01 RX ORDER — BUPIVACAINE HYDROCHLORIDE 5 MG/ML
INJECTION, SOLUTION EPIDURAL; INTRACAUDAL
Status: COMPLETED
Start: 2024-11-01 | End: 2024-11-01

## 2024-11-01 RX ORDER — BUPIVACAINE HYDROCHLORIDE 5 MG/ML
INJECTION, SOLUTION EPIDURAL; INTRACAUDAL
Status: COMPLETED | OUTPATIENT
Start: 2024-11-01 | End: 2024-11-01

## 2024-11-01 RX ORDER — FENTANYL CITRATE 50 UG/ML
25 INJECTION, SOLUTION INTRAMUSCULAR; INTRAVENOUS EVERY 5 MIN PRN
Status: DISCONTINUED | OUTPATIENT
Start: 2024-11-01 | End: 2024-11-01 | Stop reason: HOSPADM

## 2024-11-01 RX ORDER — SODIUM CHLORIDE 9 MG/ML
INJECTION, SOLUTION INTRAVENOUS CONTINUOUS
Status: DISCONTINUED | OUTPATIENT
Start: 2024-11-01 | End: 2024-11-01 | Stop reason: HOSPADM

## 2024-11-01 RX ORDER — LIDOCAINE HYDROCHLORIDE 10 MG/ML
1 INJECTION, SOLUTION EPIDURAL; INFILTRATION; INTRACAUDAL; PERINEURAL
Status: DISCONTINUED | OUTPATIENT
Start: 2024-11-01 | End: 2024-11-01 | Stop reason: HOSPADM

## 2024-11-01 RX ORDER — OXYCODONE HYDROCHLORIDE 5 MG/1
10 TABLET ORAL PRN
Status: COMPLETED | OUTPATIENT
Start: 2024-11-01 | End: 2024-11-01

## 2024-11-01 RX ORDER — LIDOCAINE HYDROCHLORIDE 40 MG/ML
SOLUTION TOPICAL
Status: DISCONTINUED | OUTPATIENT
Start: 2024-11-01 | End: 2024-11-01 | Stop reason: SDUPTHER

## 2024-11-01 RX ORDER — ONDANSETRON 4 MG/1
4 TABLET, FILM COATED ORAL 3 TIMES DAILY PRN
Qty: 15 TABLET | Refills: 0 | Status: SHIPPED | OUTPATIENT
Start: 2024-11-01

## 2024-11-01 RX ORDER — ONDANSETRON 2 MG/ML
INJECTION INTRAMUSCULAR; INTRAVENOUS
Status: DISCONTINUED | OUTPATIENT
Start: 2024-11-01 | End: 2024-11-01 | Stop reason: SDUPTHER

## 2024-11-01 RX ORDER — NALOXONE HYDROCHLORIDE 0.4 MG/ML
INJECTION, SOLUTION INTRAMUSCULAR; INTRAVENOUS; SUBCUTANEOUS PRN
Status: DISCONTINUED | OUTPATIENT
Start: 2024-11-01 | End: 2024-11-01 | Stop reason: HOSPADM

## 2024-11-01 RX ORDER — MIDAZOLAM HYDROCHLORIDE 1 MG/ML
INJECTION, SOLUTION INTRAMUSCULAR; INTRAVENOUS
Status: COMPLETED | OUTPATIENT
Start: 2024-11-01 | End: 2024-11-01

## 2024-11-01 RX ORDER — SENNOSIDES 8.6 MG
1 TABLET ORAL DAILY
Qty: 30 TABLET | Refills: 0 | Status: SHIPPED | OUTPATIENT
Start: 2024-11-01

## 2024-11-01 RX ORDER — OXYCODONE HYDROCHLORIDE 5 MG/1
5 TABLET ORAL PRN
Status: COMPLETED | OUTPATIENT
Start: 2024-11-01 | End: 2024-11-01

## 2024-11-01 RX ADMIN — TRANEXAMIC ACID 1179 MG: 100 INJECTION, SOLUTION INTRAVENOUS at 15:36

## 2024-11-01 RX ADMIN — HYDROMORPHONE HYDROCHLORIDE 0.5 MG: 2 INJECTION, SOLUTION INTRAMUSCULAR; INTRAVENOUS; SUBCUTANEOUS at 16:49

## 2024-11-01 RX ADMIN — OXYCODONE 5 MG: 5 TABLET ORAL at 19:28

## 2024-11-01 RX ADMIN — PROPOFOL 50 MG: 10 INJECTION, EMULSION INTRAVENOUS at 15:17

## 2024-11-01 RX ADMIN — WATER 2000 MG: 1 INJECTION INTRAMUSCULAR; INTRAVENOUS; SUBCUTANEOUS at 15:25

## 2024-11-01 RX ADMIN — PROPOFOL 30 MG: 10 INJECTION, EMULSION INTRAVENOUS at 17:55

## 2024-11-01 RX ADMIN — SUGAMMADEX 300 MG: 100 INJECTION, SOLUTION INTRAVENOUS at 18:09

## 2024-11-01 RX ADMIN — ROCURONIUM BROMIDE 50 MG: 10 INJECTION, SOLUTION INTRAVENOUS at 15:17

## 2024-11-01 RX ADMIN — Medication 20 MG: at 16:23

## 2024-11-01 RX ADMIN — PROPOFOL 20 MG: 10 INJECTION, EMULSION INTRAVENOUS at 17:28

## 2024-11-01 RX ADMIN — BUPIVACAINE HYDROCHLORIDE 2 ML: 5 INJECTION, SOLUTION EPIDURAL; INTRACAUDAL at 12:06

## 2024-11-01 RX ADMIN — ONDANSETRON 4 MG: 2 INJECTION INTRAMUSCULAR; INTRAVENOUS at 17:34

## 2024-11-01 RX ADMIN — MIDAZOLAM HYDROCHLORIDE 2 MG: 2 INJECTION, SOLUTION INTRAMUSCULAR; INTRAVENOUS at 13:58

## 2024-11-01 RX ADMIN — PROPOFOL 20 MG: 10 INJECTION, EMULSION INTRAVENOUS at 18:05

## 2024-11-01 RX ADMIN — HYDROMORPHONE HYDROCHLORIDE 1 MG: 2 INJECTION, SOLUTION INTRAMUSCULAR; INTRAVENOUS; SUBCUTANEOUS at 16:56

## 2024-11-01 RX ADMIN — SODIUM CHLORIDE: 9 INJECTION, SOLUTION INTRAVENOUS at 13:19

## 2024-11-01 RX ADMIN — LIDOCAINE HYDROCHLORIDE 4 ML: 40 SOLUTION TOPICAL at 15:20

## 2024-11-01 RX ADMIN — ROCURONIUM BROMIDE 30 MG: 10 INJECTION, SOLUTION INTRAVENOUS at 16:00

## 2024-11-01 RX ADMIN — BUPIVACAINE HYDROCHLORIDE 3 ML: 5 INJECTION, SOLUTION EPIDURAL; INTRACAUDAL; PERINEURAL at 13:58

## 2024-11-01 RX ADMIN — LIDOCAINE HYDROCHLORIDE 100 MG: 20 INJECTION, SOLUTION INTRAVENOUS at 15:15

## 2024-11-01 RX ADMIN — ROCURONIUM BROMIDE 20 MG: 10 INJECTION, SOLUTION INTRAVENOUS at 17:03

## 2024-11-01 RX ADMIN — BUPIVACAINE 8 ML: 13.3 INJECTION, SUSPENSION, LIPOSOMAL INFILTRATION at 13:58

## 2024-11-01 RX ADMIN — DEXAMETHASONE SODIUM PHOSPHATE 4 MG: 4 INJECTION INTRA-ARTICULAR; INTRALESIONAL; INTRAMUSCULAR; INTRAVENOUS; SOFT TISSUE at 15:30

## 2024-11-01 RX ADMIN — PROPOFOL 20 MG: 10 INJECTION, EMULSION INTRAVENOUS at 18:01

## 2024-11-01 RX ADMIN — PROPOFOL 100 MG: 10 INJECTION, EMULSION INTRAVENOUS at 15:16

## 2024-11-01 RX ADMIN — HYDROMORPHONE HYDROCHLORIDE 0.5 MG: 2 INJECTION, SOLUTION INTRAMUSCULAR; INTRAVENOUS; SUBCUTANEOUS at 16:34

## 2024-11-01 ASSESSMENT — PAIN DESCRIPTION - INTENSITY: RATING_2: 0

## 2024-11-01 ASSESSMENT — PAIN DESCRIPTION - PAIN TYPE: TYPE: ACUTE PAIN;SURGICAL PAIN

## 2024-11-01 ASSESSMENT — PAIN DESCRIPTION - ORIENTATION
ORIENTATION: UPPER
ORIENTATION: MID;UPPER
ORIENTATION_2: RIGHT

## 2024-11-01 ASSESSMENT — PAIN DESCRIPTION - FREQUENCY: FREQUENCY: CONTINUOUS

## 2024-11-01 ASSESSMENT — PAIN SCALES - GENERAL
PAINLEVEL_OUTOF10: 1
PAINLEVEL_OUTOF10: 2
PAINLEVEL_OUTOF10: 4

## 2024-11-01 ASSESSMENT — PAIN DESCRIPTION - LOCATION
LOCATION: CHEST
LOCATION: STERNUM
LOCATION_2: SHOULDER
LOCATION: STERNUM

## 2024-11-01 ASSESSMENT — PAIN DESCRIPTION - ONSET: ONSET: ON-GOING

## 2024-11-01 ASSESSMENT — PAIN DESCRIPTION - DESCRIPTORS: DESCRIPTORS: ACHING;SORE

## 2024-11-01 ASSESSMENT — PAIN - FUNCTIONAL ASSESSMENT: PAIN_FUNCTIONAL_ASSESSMENT: 0-10

## 2024-11-01 NOTE — PROGRESS NOTES
Patient admitted to PACU # 7 from OR at 1820 post RIGHT SHOUKLDER STERNOCLAVICULAR JOINT ARTHROPLASTY, RIGHT SHOULDER ARTHROSCOPY, ARTHROSCOPIC EXTENSIVE DEBRIDEMENT, DECOMPRESSION,DISTAL CLAVICLE EXCISION, ARTHROSCOPIC BICEPS TENODESIS, ARTHROSCOPIC LABRAL REPAIR - Right  per Michel Boyd MD .  Attached to PACU monitoring system and report received from anesthesia provider.  Patient was reported to be hemodynamically stable during procedure.  Patient drowsy on admission and denied pain.    Pt arrived to PACU with ABD and medipore tape covering RUE surgical incisions.  Dressings are CDI.  Ice pack applied to right shoulder.      Pt arrived to PACU with DonJoy UltraSling II in place on RUE.

## 2024-11-01 NOTE — ANESTHESIA PROCEDURE NOTES
Peripheral Block    Patient location during procedure: pre-op  Reason for block: post-op pain management and at surgeon's request  Start time: 11/1/2024 1:58 PM  End time: 11/1/2024 12:04 PM  Staffing  Performed: anesthesiologist   Anesthesiologist: Axel Baum DO  Performed by: Axel Baum DO  Authorized by: Axel Baum DO    Preanesthetic Checklist  Completed: patient identified, IV checked, site marked, risks and benefits discussed, surgical/procedural consents, equipment checked, pre-op evaluation, timeout performed, anesthesia consent given, oxygen available, monitors applied/VS acknowledged, fire risk safety assessment completed and verbalized and blood product R/B/A discussed and consented  Peripheral Block   Patient position: sitting  Prep: ChloraPrep  Provider prep: mask and sterile gloves  Patient monitoring: cardiac monitor, continuous pulse ox, continuous capnometry, frequent blood pressure checks, IV access, oxygen and responsive to questions  Block type: Brachial plexus  Interscalene  Laterality: right  Injection technique: single-shot  Guidance: ultrasound guided  Local infiltration: bupivacaine  Local infiltration: bupivacaine    Needle   Needle type: insulated echogenic nerve stimulator needle   Needle gauge: 20 G  Needle localization: ultrasound guidance  Needle length: 8 cm  Assessment   Injection assessment: negative aspiration for heme, no paresthesia on injection, local visualized surrounding nerve on ultrasound and no intravascular symptoms  Paresthesia pain: none  Slow fractionated injection: yes  Hemodynamics: stable  Outcomes: uncomplicated and patient tolerated procedure well    Medications Administered  midazolam (VERSED) injection 2 mg/2mL - IntraVENous   2 mg - 11/1/2024 1:58:00 PM  BUPivacaine (MARCAINE) PF injection 0.5% - Perineural   3 mL - 11/1/2024 1:58:00 PM  BUPivacaine liposome (EXPAREL) injection 1.3% - Perineural   8 mL - 11/1/2024 1:58:00

## 2024-11-01 NOTE — ANESTHESIA POSTPROCEDURE EVALUATION
Department of Anesthesiology  Postprocedure Note    Patient: Luzma Pederson  MRN: [id number]  YOB: 1970  Date of evaluation: 11/1/2024    Procedure Summary       Date: 11/01/24 Room / Location: [address]    Anesthesia Start: 1510 Anesthesia Stop: 1820    Procedure: RIGHT SHOUKLDER STERNOCLAVICULAR JOINT ARTHROPLASTY, RIGHT SHOULDER ARTHROSCOPY, ARTHROSCOPIC EXTENSIVE DEBRIDEMENT, DECOMPRESSION,DISTAL CLAVICLE EXCISION, ARTHROSCOPIC BICEPS TENODESIS, ARTHROSCOPIC LABRAL REPAIR (Right: Shoulder) Diagnosis:       Sternoclavicular joint pain, right      Arthritis of right shoulder region      (Sternoclavicular joint pain, right [M25.511])      (Arthritis of right shoulder region [M19.011])    Surgeons: Michel Boyd MD Responsible Provider: Clem Egan MD    Anesthesia Type: general ASA Status: 3            Anesthesia Type: No value filed.    Sharmila Phase I: Sharmila Score: 8    Sharmila Phase II:      Anesthesia Post Evaluation    Patient location during evaluation: PACU  Patient participation: complete - patient participated  Level of consciousness: awake and alert  Pain score: 0  Airway patency: patent  Nausea & Vomiting: no nausea and no vomiting  Cardiovascular status: hemodynamically stable  Respiratory status: acceptable  Hydration status: euvolemic  Multimodal analgesia pain management approach  Pain management: satisfactory to patient    No notable events documented.

## 2024-11-01 NOTE — NURSE NAVIGATOR
Brachial and cervical plexus nerves blocked by Dr Crespo.  Patient was pre-medicated prior to block with versed 2mg given IVP.  Patient tolerated procedure well and remained alert and responsive throughout, with stable vital signs as reflected on VS flow sheet   Patient present awake and alert and talking to  at BS

## 2024-11-01 NOTE — PROGRESS NOTES
Pt arrived to PACU with brand new DonJoy UltraSling II in place.  This is not the sling she was fitted for pre-operatively.   The sling she was fitted for was acquired from patient's belongings and placed on the patient prior to discharge to home.

## 2024-11-01 NOTE — ANESTHESIA PRE PROCEDURE
Department of Anesthesiology  Preprocedure Note       Name:  Luzma Pederson   Age:  54 y.o.  :  1970                                          MRN:  [id number]         Date:  2024      Surgeon: Surgeon(s):  Michel Boyd MD    Procedure: Procedure(s):  Right shoulder sternoclavicular joint arthroplasty, Arthroscopoic distal clavicle excision, debridment  .    Medications prior to admission:   Prior to Admission medications    Medication Sig Start Date End Date Taking? Authorizing Provider   meloxicam (MOBIC) 15 MG tablet TAKE 1 TABLET BY MOUTH DAILY AS NEEDED FOR PAIN 6/3/24  Yes Sam Lazaro APRN - CNP   Acetaminophen (TYLENOL PO) Take 650 mg by mouth 4 times daily as needed    Hector Craft MD   rosuvastatin (CRESTOR) 10 MG tablet Take 1 tablet by mouth nightly 24   Sam Lazaro APRN - CNP   lisinopril (PRINIVIL;ZESTRIL) 10 MG tablet Take 1 tablet by mouth daily 24   Sam Lazaro APRN - CNP   vitamin D 25 MCG (1000 UT) CAPS TAKE FOUR CAPSULES BY MOUTH DAILY 6/3/24   Sam Lazaro APRN - CNP   ferrous sulfate (IRON 325) 325 (65 Fe) MG tablet Take 1 tablet by mouth every other day  Patient not taking: Reported on 2024 3/11/24   Sam Lazaro APRN - CNP   Calcium Carb-Cholecalciferol (CALTRATE 600+D3 PO)  22   ProviderHector MD   Multiple Vitamins-Minerals (ONE DAILY MULTIVITAMIN WOMEN) TABS  23   Hector Craft MD       Current medications:    Current Facility-Administered Medications   Medication Dose Route Frequency Provider Last Rate Last Admin   • sodium chloride flush 0.9 % injection 5-40 mL  5-40 mL IntraVENous 2 times per day Nagar, Sushma, PA-C       • sodium chloride flush 0.9 % injection 5-40 mL  5-40 mL IntraVENous PRN Nagar, Sushma, PA-C       • 0.9 % sodium chloride infusion   IntraVENous PRN Nagar, Sushma, PA-C       • ceFAZolin (ANCEF) 2,000 mg in sterile water 20 mL IV syringe  2,000 mg IntraVENous On Call to OR

## 2024-11-01 NOTE — DISCHARGE INSTRUCTIONS
INSTRUCTIONS AFTER SHOULDER SURGERY    1. You have probably had a Scalene Block to your arm. Because this numbs the arm this is great for anesthesia since we didn’t have to give you as much anesthetic agent during the case--hopefully allowing you to feel more normal sooner. The block should last around 12 hours or so, but can last several days. We recommend that you take pain medication even if you are feeling only mild pain in the first couple of days after surgery. It is easier to maintain good pain relief than to try to “catch up”.     2. You may have to take the pain medicine every 4-8 hours for the first day or two. In the first two post operative days, if your pain is severe, it is ok to take two pain pills at a time. Limit this as much as possible. After this, you can wean off the medicine and just take it as needed. Be aware of limiting your tylenol intake as it is built into the narcotic medicines. Keep daily tylenol dose under 4000 milligrams.    3. All pain medications can make you constipated. Drink plenty of fluids and eat lots of fiber to combat this. You will be sent home with a prescription for Miralax. OK to supplement with other over-the-counter laxatives or stool softners as needed.    4. Leave the dressing in place, we will change it at your first post-operative appointment.    5. Getting comfortable for sleeping is difficult after this surgery. It gets easier after several days. Many patients sleep better in a reclining lounger like a Lay-Z-Boy or in bed with several pillows to help prop them up.    6. Ice packs are very helpful to reduce pain and inflammation after the surgery. Since the bandage is so thick you can leave the ice bags on top until the shoulder gets cold. Don’t ice bare skin however or you could get frostbite.    7. Anesthesia and the pain medication can cause nausea. You will be sent home with anti-nausea medication to take as needed if this is a problem.    8. You probably have  heavy or asleep   Weakness or inability to move or control your affected area   Inability to feel temperature changes to your affected area  Usually the weakness wears off first, followed by the tingling or heaviness. You may notice more pain at this point and should start taking your pain meds.   If you had a shoulder block, you may experience:   Mild shortness of breath (may be relieved by sitting up in a chair or recliner)   Hoarse voice   Blurry vision   Unequal pupils   Drooping of your face (eye or lip) on the same side as the nerve block   Swelling at the injection site on the side of your neck  These side effects should resolve as the block wears off   IF you have severe or prolonged shortness of breath- GO to the nearest Emergency Room  If you had a nerve block of your arm:   Protect the arm  from extreme hot or cold temperatures   Protect the arm from obstructing blood flow by frequent position changes- Make sure fingers stay pink and warm. Call surgeon with any changes   If you continue to feel the effects of the nerve block for longer than 72 hours- call [facility] at [phone] and ask to speak with the Anesthesiologist on call.    Your Surgeon or Anesthesiologist gave you Exparel     Exparel (bupivicaine liposome injectable suspension) is a medication injected into the surgical incision  just before the end of the procedure by your surgeon to help control your pain after surgery.  It can also be given as a nerve block by the anesthesiologist. This local anesthetic provides pain relief by numbing the tissue around the surgical site.  Exparel releases pain medication over time lasting up to 5 days or 120 hours.  Exparel may cause a temporary loss of sensation or the ability to move in the area where the medication was injected.    Side effects of Exparel that may occur include nausea, vomiting or constipation.  Call immediately if you experience numbness or tingling of the mouth or lips,

## 2024-11-01 NOTE — ANESTHESIA POSTPROCEDURE EVALUATION
Department of Anesthesiology  Postprocedure Note    Patient: Luzma Pederson  MRN: [id number]  YOB: 1970  Date of evaluation: 11/1/2024    Procedure Summary       Date: 11/01/24 Room / Location: [address]    Anesthesia Start: 1510 Anesthesia Stop: 1820    Procedure: RIGHT SHOUKLDER STERNOCLAVICULAR JOINT ARTHROPLASTY, RIGHT SHOULDER ARTHROSCOPY, ARTHROSCOPIC EXTENSIVE DEBRIDEMENT, DECOMPRESSION,DISTAL CLAVICLE EXCISION, ARTHROSCOPIC BICEPS TENODESIS, ARTHROSCOPIC LABRAL REPAIR (Right: Shoulder) Diagnosis:       Sternoclavicular joint pain, right      Arthritis of right shoulder region      (Sternoclavicular joint pain, right [M25.511])      (Arthritis of right shoulder region [M19.011])    Surgeons: Michel Boyd MD Responsible Provider: Clem Egan MD    Anesthesia Type: general ASA Status: 3            Anesthesia Type: No value filed.    Sharmila Phase I: Sharmila Score: 8    Sharmila Phase II:      Anesthesia Post Evaluation    Patient location during evaluation: PACU  Patient participation: complete - patient participated  Level of consciousness: awake and alert  Airway patency: patent  Nausea & Vomiting: no nausea and no vomiting  Cardiovascular status: hemodynamically stable  Respiratory status: acceptable  Hydration status: euvolemic  Multimodal analgesia pain management approach  Pain management: satisfactory to patient    No notable events documented.

## 2024-11-01 NOTE — ANESTHESIA PROCEDURE NOTES
Peripheral Block    Patient location during procedure: pre-op  Reason for block: post-op pain management and at surgeon's request  Start time: 11/1/2024 12:06 PM  End time: 11/1/2024 12:08 PM  Staffing  Performed: anesthesiologist   Anesthesiologist: Axel Baum DO  Performed by: Axel Baum DO  Authorized by: Axel Baum DO    Preanesthetic Checklist  Completed: patient identified, IV checked, site marked, risks and benefits discussed, surgical/procedural consents, equipment checked, pre-op evaluation, timeout performed, anesthesia consent given, oxygen available, monitors applied/VS acknowledged, fire risk safety assessment completed and verbalized and blood product R/B/A discussed and consented  Peripheral Block   Patient position: sitting  Prep: ChloraPrep  Provider prep: sterile gloves and mask  Patient monitoring: cardiac monitor, continuous pulse ox, continuous capnometry, frequent blood pressure checks, IV access, oxygen and responsive to questions  Block type: Cervical plexus  Superficial cervical  Laterality: right  Injection technique: single-shot  Guidance: ultrasound guided    Needle   Needle type: insulated echogenic nerve stimulator needle   Needle gauge: 20 G  Needle localization: ultrasound guidance  Needle length: 8 cm  Assessment   Injection assessment: negative aspiration for heme, no paresthesia on injection, local visualized surrounding nerve on ultrasound and no intravascular symptoms  Paresthesia pain: none  Slow fractionated injection: yes  Hemodynamics: stable  Outcomes: patient tolerated procedure well and uncomplicated    Medications Administered  BUPivacaine (MARCAINE) PF injection 0.5% - Perineural   2 mL - 11/1/2024 12:06:00 PM  BUPivacaine liposome (EXPAREL) injection 1.3% - Perineural   2 mL - 11/1/2024 12:06:00 PM

## 2024-11-01 NOTE — H&P
Chief Complaint    Shoulder Pain (LEFT SHOULDER MRI)        History of Present Illness:  Luzma Pederson is a pleasant, 54 y.o., female, here today for follow up of right shoulder as she is dealing with SC joint arthritis and left shoulder rotator cuff dysfunction. She has also obtained a MRI of her left shoulder that will be reviewed at this visit.  She reports no new injuries or setbacks. No new changes since last visit.      Pain Assessment  Location of Pain: Shoulder  Location Modifiers: Left, Right  Severity of Pain: 1  Quality of Pain: Dull, Aching  Frequency of Pain: Constant  Aggravating Factors: Other (Comment), Straightening, Stretching, Exercise  Limiting Behavior: Some  Relieving Factors: Rest  Work-Related Injury: No  Are there other pain locations you wish to document?: No        Medical History:  Patient's medications, allergies, past medical, surgical, social and family histories were reviewed and updated as appropriate.     No notes on file     Review of Systems  A 14 point review of systems was completed by the patient and is available in the media section of the scanned medical record and was reviewed on 9/25/2024.  The review is negative with the exception of those things mentioned in the HPI and Past Medical History     Vital Signs:  There were no vitals filed for this visit.     General/Appearance: Alert and oriented and in no apparent distress.     Skin:  There are no skin lesions, cellulitis, or extreme edema. The patient has warm and well-perfused Bilateral upper extremities with brisk capillary refill.       Left Shoulder Exam:  Inspection:  No gross deformities, no signs of infection.     Palpation: Tenderness over anterior rotator cuff tendon     Active Range of Motion: WNL     Passive Range of Motion: Deferred     Strength: Deferred     Special Tests:   No Faraz muscle deformity.     Neurovascular: Sensation to light touch is intact, no motor deficits, palpable radial pulses 2+

## 2024-11-01 NOTE — TELEPHONE ENCOUNTER
General Question     Subject: SURGERY TIME   Patient and /or Facility Request: Luzma Pederson   Contact Number: [phone]     PATIENT REQUESTING A CALL MAYKEL. PATIENT STATES THAT SHE WAS TOLD SURGERY WAS AT 12PM TODAY AND TO ARRIVE AT THE HOSPITAL AT 10AM BUT WHEN SHE ARRIVED SHE WAS TOLD SURGERY WAS AT 230PM     PLEASE CALL THE PATIENT BACK AT THE ABOVE NUMBER

## 2024-11-02 NOTE — PROGRESS NOTES
Pt with VSS on room air.  Pt states pain is now 2/10 to upper sternum surgical site and 0/10 to right shoulder/arm surgical sites.    Pt tolerating po fluids and pudding, denies nausea.      Pt able to assist as much as possible with getting dressed.      Pt ambulated x 20 steps after exiting stretcher and sitting in wheelchair.  Pt was able to ambulate with x 1 contact assist.

## 2024-11-02 NOTE — FLOWSHEET NOTE
PACU Discharge Note    Current Allergies: Tetanus toxoids    Pt meets criteria for discharge to home per Sharmila Score and ASPAN standards.    Discussed with patient and responsible individual receiving instructions how to measure pain per numerical scale and when to contact doctor if prescribed medications are not helping with post operative pain    Discharge instructions reviewed with patient and family.  Both verbalized understanding of instructions. Gave patient and family opportunity to ask questions.  All questions reviewed and answered. Documents signed and copy of discharge instructions given.          11/01/24 1945   Vital Signs   Temp 97 °F (36.1 °C)   Temp Source Temporal   Pulse 94   Respirations 17   BP (!) 156/68   MAP (Calculated) 97   MAP (mmHg) 90   BP Location Left upper arm   BP Method Automatic   Patient Position Semi wlers   Pain Assessment   Pain Assessment 0-10   Pain Level 2   Patient's Stated Pain Goal 4   Pain Location Sternum   Non-Pharmaceutical Pain Intervention(s) Ice   Response to Pain Intervention Patient satisfied   Pain 2   Pain Rating 2 0  (pt denies)   Pain Location 2 Shoulder   Pain Orientation 2 Right   Opioid-Induced Sedation   POSS Score 1   Oxygen Therapy   SpO2 90 %         In: 940 [P.O.:240; I.V.:700]  Out: 50       Pain assessment:  present - adequately treated  Pain Level: 2    Offered patient opportunity to use restroom prior to discharge. Patient declined.    Patient discharged to home/self care via wheel chair by transporter/RN with a responsible individual.      11/1/2024 9:26 PM

## 2024-11-02 NOTE — OP NOTE
osteophytes.  Arthrotomy for SC joint arthroplasty revealed large osteophytes and a fair effusion.  There were some osteophytes extended posteriorly, though not too much, we were able to carry out standard SC joint arthroplasty with imbrication of the soft tissues and good stability was effected.  Diagnostic arthroscopy revealed surprisingly a full-thickness rotator cuff tear and partial-thickness biceps tendon tear.  There was a type 2 acromion that was amenable to acromioplasty with significant outlet stenosis.  The AC joint was markedly arthritic.  There were large superior osteophytes and inferior osteophytes and all these could be resected.  There was extensive bursitis as well.    BRIEF HISTORY AND PRESENTING ILLNESS:  The patient is a 54-year-old woman who presented with a principal complaint of medial clavicle pain on the right side.  She did not have any dysphagia or difficulty swallowing, but had quite a bit of pain.  She failed multimodal conservative treatment including corticosteroid injections, analgesics.  CT scan revealed severe SC joint arthritis.  We recommended concurrent distal clavicle excision arthroscopically as well as open SC joint arthroplasty.  She understood concurrent lesions would be addressed as encountered.  She understood risks such as bleeding, infection, anesthetic risks, injury to nerves and blood vessels, stiffness, weakness, incomplete pain relief, and need for further surgery.  She understood all this and wished to proceed and gave informed consent.  She was scheduled on elective basis after appropriate preoperative medical clearance.    DESCRIPTION OF PROCEDURE:  On the date of procedure, the patient was brought back to the operating room, placed supine on the operating table.  General anesthesia was established.  Preoperative antibiotics were given in the holding area.  Under anesthesia, exam was carried out with findings as noted above.  The patient was then positioned using  1.2 cm of distal clavicle was resected taking great care to resect adequate bone superiorly and posteriorly.  We removed the prominent inferior osteophyte as well as the superior osteophyte.  We preserved the superior capsule.  We operated the aly and reversed to smooth out the surface.  We went back with the arthroscope posteriorly.  We placed a 50 mm PassPort cannula laterally.  We established accessory anterolateral portal.  We placed our Healicoil anchor against the articular margin.  This was loaded with 3 high-strength sutures.  We passed the sutures sequentially from posterior to anterior using a FastPass Scorpion.  We passed them in horizontal mattress fashion, one stitch posterior, one centrally, and one anteriorly.  However, with the final pass from the anterior stitch, we did penetrate the biceps tendon.  To do so, we used an 18-gauge spinal needle from medial and anterior to penetrate the common tissue and anterior cable along with the biceps tendon.  We passed the PDS suture and this was used to shuttle the remaining high-strength suture from the anchor, we used 3 horizontal mattress stitches.  Prior to this, we went back with the arthroscope into the joint using up-biting basket, tenotomized biceps tendon, protecting the sutures in the process.  We debrided the biceps stump using a shaver.  We went back with the arthroscope into the subacromial space and tied our 3 horizontal mattress stitches.  We used a Revo knot followed by alternating half hitches.  We parked the suture tails at the accessory anterolateral portal.  After tying all 3 stitches, we retrieved one limb of each of the 3 pairs.  These 3 strands were loaded onto a 4.75 BioComposite SwiveLock anchor, this was placed through a punch hole anterolateral right behind the bicipital groove.  The core suture was not used.  We had good fixation.  Flush cutter was used to cut the tail short.  In identical manner, the remaining 3 strands were loaded

## 2024-11-04 DIAGNOSIS — E55.9 VITAMIN D DEFICIENCY: ICD-10-CM

## 2024-11-06 ENCOUNTER — HOSPITAL ENCOUNTER (OUTPATIENT)
Dept: PHYSICAL THERAPY | Age: 54
Setting detail: THERAPIES SERIES
Discharge: HOME | End: 2024-11-06
Attending: ORTHOPAEDIC SURGERY
Payer: COMMERCIAL

## 2024-11-06 ENCOUNTER — OFFICE VISIT (OUTPATIENT)
Dept: ORTHOPEDIC SURGERY | Age: 54
End: 2024-11-06

## 2024-11-06 VITALS — BODY MASS INDEX: 44.39 KG/M2 | WEIGHT: 260 LBS | HEIGHT: 64 IN

## 2024-11-06 DIAGNOSIS — Z98.890 S/P SHOULDER SURGERY: ICD-10-CM

## 2024-11-06 DIAGNOSIS — Z98.890 S/P ROTATOR CUFF REPAIR: ICD-10-CM

## 2024-11-06 DIAGNOSIS — M25.511 STERNOCLAVICULAR JOINT PAIN, RIGHT: Primary | ICD-10-CM

## 2024-11-06 PROCEDURE — 99024 POSTOP FOLLOW-UP VISIT: CPT | Performed by: ORTHOPAEDIC SURGERY

## 2024-11-06 PROCEDURE — 97161 PT EVAL LOW COMPLEX 20 MIN: CPT

## 2024-11-06 PROCEDURE — 97110 THERAPEUTIC EXERCISES: CPT

## 2024-11-06 NOTE — PROGRESS NOTES
services described in this documentation as described by Sushma Nagar, PA-C in my presence, and it is both accurate and complete.    Michel Boyd MD, PhD  11/6/2024

## 2024-11-06 NOTE — PLAN OF CARE
[facility]- Outpatient Rehabilitation and Therapy [address], [place] [address] office: [phone] fax: [phone]     Physical Therapy Initial Evaluation Certification      Dear Michel Boyd MD ,    We had the pleasure of evaluating the following patient for physical therapy services at [facility].  A summary of our findings can be found in the initial assessment below.  This includes our plan of care.  If you have any questions or concerns regarding these findings, please do not hesitate to contact me at the office phone number listed above.  Thank you for the referral.     Physician Signature:_______________________________Date:__________________  By signing above (or electronic signature), therapist’s plan is approved by physician       Physical Therapy: TREATMENT/PROGRESS NOTE   Patient: Luzma Pederson (54 y.o. female)   Examination Date: 2024   :  1970 MRN: [id number]   Visit #: 1/*  Insurance Allowable Auth Needed   Auth  30 [id number] [x]Yes    []No    Insurance: Payor: [facility] / Plan: [employer] [facility] - [place] PPO / Product Type: *No Product type* /   Insurance ID: [id number] - ([facility])  Secondary Insurance (if applicable):    Treatment Diagnosis: M25.511 Pain in right shoulder   Medical Diagnosis:  Sternoclavicular joint pain, right [M25.511]   Referring Physician: Michel Boyd MD  PCP: Sam Lazaro, APRN - CNP     Plan of care signed (Y/N):     Date of Patient follow up with Physician: 24     Plan of Care Report: EVAL   POC update due: (10 visits /OR AUTH LIMITS, whichever is less) 2024                                             Medical History:  Comorbidities / Relevant Medical History: HTN, osteoporosis/osteopenia, OA, hx oc COVID                                          Precautions/ Contra-indications:           Latex allergy:  NO  Pacemaker:    NO  Contraindications for Manipulation: recent surgical history

## 2024-11-13 ENCOUNTER — HOSPITAL ENCOUNTER (OUTPATIENT)
Dept: PHYSICAL THERAPY | Age: 54
Setting detail: THERAPIES SERIES
Discharge: HOME | End: 2024-11-13
Attending: ORTHOPAEDIC SURGERY
Payer: COMMERCIAL

## 2024-11-13 PROCEDURE — 97530 THERAPEUTIC ACTIVITIES: CPT

## 2024-11-13 PROCEDURE — 97110 THERAPEUTIC EXERCISES: CPT

## 2024-11-13 NOTE — FLOWSHEET NOTE
Dry Needle 1-2 muscle (83403)     Aquatic Therex (97878)    Dry Needle 3+ muscle (30769)     Iontophoresis (42272)    VASO (27946)     Ultrasound (10595)    Group Therapy (93059)     Estim Attended (52309)    Canalith Repositioning (70495)     Physical Performance Test (98988)    Custom orthotic ()     Other:    Other: Cold pack 10'     Total Timed Code Tx Minutes 39' 3       Total Treatment Minutes 49'        Charge Justification:  (39390) THERAPEUTIC EXERCISE - Provided verbal/tactile cueing for activities related to strengthening, flexibility, endurance, ROM performed to prevent loss of range of motion, maintain or improve muscular strength or increase flexibility, following either an injury or surgery.   (95048) HOME EXERCISE PROGRAM - Reviewed/Progressed HEP activities related to strengthening, flexibility, endurance, ROM performed to prevent loss of range of motion, maintain or improve muscular strength or increase flexibility, following either an injury or surgery.  (88832) THERAPEUTIC ACTIVITY - use of dynamic activities to improve functional performance. (Ex include squatting, ascending/descending stairs, walking, bending, lifting, catching, throwing, pushing, pulling, jumping.)  Direct, one on one contact, billed in 15-minute increments.    GOALS     Patient stated goal: full range of motion  [] Progressing: [] Met: [] Not Met: [] Adjusted    Therapist goals for Patient:   Short Term Goals: To be achieved in: 2 weeks  1. Independent in HEP and progression per patient tolerance, in order to prevent re-injury.   [] Progressing: [] Met: [] Not Met: [] Adjusted  2. Patient will have a decrease in pain to <3/10 to facilitate improvement in movement, function, and ADLs as indicated by Functional Deficits.  [] Progressing: [] Met: [] Not Met: [] Adjusted    Long Term Goals: To be achieved in: 20+ weeks  1. Disability index score of 25% or less for the Upper Extremity functional Scale to assist with reaching

## 2024-11-20 ENCOUNTER — HOSPITAL ENCOUNTER (OUTPATIENT)
Dept: PHYSICAL THERAPY | Age: 54
Setting detail: THERAPIES SERIES
Discharge: HOME | End: 2024-11-20
Attending: ORTHOPAEDIC SURGERY
Payer: COMMERCIAL

## 2024-11-20 PROCEDURE — 97110 THERAPEUTIC EXERCISES: CPT

## 2024-11-20 PROCEDURE — 97530 THERAPEUTIC ACTIVITIES: CPT

## 2024-11-20 NOTE — FLOWSHEET NOTE
[facility]- Outpatient Rehabilitation and Therapy [address], [place] [address] office: [phone] fax: [phone]       Physical Therapy: TREATMENT/PROGRESS NOTE   Patient: Luzma Pederson (54 y.o. female)   Examination Date: 2024   :  1970 MRN: [id number]   Visit #: 3/11   Insurance Allowable Auth Needed   Auth  30 [id number] [x]Yes    []No    Insurance: Payor: [employer] / Plan: [employer] - [place] PPO / Product Type: *No Product type* /   Insurance ID: [id number] - ([place] [employer])  Secondary Insurance (if applicable):    Treatment Diagnosis: M25.511 Pain in right shoulder    Medical Diagnosis:  Sternoclavicular joint pain, right [M25.511]   Referring Physician: Michel Boyd MD  PCP: Sam Lazaro APRN - CNP     Plan of care signed (Y/N): Y    Date of Patient follow up with Physician: 24     Plan of Care Report: EVAL   POC update due: (10 visits /OR AUTH LIMITS, whichever is less) 2024                                             Medical History:  Comorbidities / Relevant Medical History: HTN, osteoporosis/osteopenia, OA, hx of COVID                                          Precautions/ Contra-indications:           Latex allergy:  NO  Pacemaker:    NO  Contraindications for Manipulation: recent surgical history (relative)  Date of Surgery: 24, right shoulder open SC joint arthroplasty, arthroscopic distal clavicle excision, biceps tenodesis and rotator cuff repair   Other:  Red Flags:  None  Suicide Screening:   The patient did not verbalize a primary behavioral concern, suicidal ideation, suicidal intent, or demonstrate suicidal behaviors.    Preferred Language for Healthcare:   [x] English       [] other:    SUBJECTIVE EXAMINATION     Patient stated complaint: Patient reports soreness along the anterior incisions. Overall, minimal pain and doing well. She reports good sling compliance. She has moved from the  to the couch, propping the arm in the

## 2024-11-25 ENCOUNTER — OFFICE VISIT (OUTPATIENT)
Dept: ORTHOPEDIC SURGERY | Age: 54
End: 2024-11-25

## 2024-11-25 VITALS — BODY MASS INDEX: 44.39 KG/M2 | HEIGHT: 64 IN | WEIGHT: 260 LBS

## 2024-11-25 DIAGNOSIS — Z98.890 S/P ROTATOR CUFF REPAIR: Primary | ICD-10-CM

## 2024-11-25 DIAGNOSIS — Z98.890 S/P SHOULDER SURGERY: ICD-10-CM

## 2024-11-25 PROCEDURE — 99024 POSTOP FOLLOW-UP VISIT: CPT | Performed by: PHYSICIAN ASSISTANT

## 2024-11-26 ENCOUNTER — HOSPITAL ENCOUNTER (OUTPATIENT)
Dept: PHYSICAL THERAPY | Age: 54
Setting detail: THERAPIES SERIES
Discharge: HOME | End: 2024-11-26
Attending: ORTHOPAEDIC SURGERY
Payer: COMMERCIAL

## 2024-11-26 PROCEDURE — 97110 THERAPEUTIC EXERCISES: CPT

## 2024-11-26 PROCEDURE — 97530 THERAPEUTIC ACTIVITIES: CPT

## 2024-11-26 NOTE — FLOWSHEET NOTE
Mobilization, Dry Needling/IASTM, Trigger Point Release, and Myofascial Release  Modalities as needed that may include: Cryotherapy  Patient education on joint protection, postural re-education, activity modification, and progression of HEP    Plan: POC initiated as per evaluation    Electronically Signed by Saima Su, PT  Date: 11/26/2024     Note: Portions of this note have been templated and/or copied from initial evaluation, reassessments and prior notes for documentation efficiency.    Note: If patient does not return for scheduled/recommended follow up visits, this note will serve as a discharge from care along with the most recent update on progress.

## 2024-11-26 NOTE — PROGRESS NOTES
History of Present Illness:  Luzma Pederson is a 54 y.o. female who presents for a post operative visit.  The patient underwent a right shoulder open SC joint arthroplasty, arthroscopic distal clavicle excision, biceps tenodesis and rotator cuff repair on 11/1/2024.   She has been compliant with wearing the UltraSling brace at all times.  She plans to go to [place] for physical therapy. The patient denies any fevers, chills, numbness, tingling, and shortness of breath.    Medical History:  Patient's medications, allergies, past medical, surgical, social and family histories were reviewed and updated as appropriate.    Review of Systems  A 14 point review of systems was completed by the patient is available in the media section of the scanned medical record and was reviewed on 11/26/2024.      Vital Signs:  There were no vitals filed for this visit.    General/Appearance: Alert and oriented and in no apparent distress.    Skin:  There are no skin lesions, cellulitis, or extreme edema. The patient has warm and well-perfused Bilateral upper extremities with brisk capillary refill.      right Shoulder Exam:    Inspection: right shoulder incision that is clean, dry and intact and well approximated.  Swelling has improved.  There is no erythema, drainage or other signs of infection    Palpation:  No crepitus to gentle motion    Active Range of Motion: Deferred    Passive Range of Motion: FE to 50    Strength:  Deferred    Special Tests:  Deferred.    Neurovascular: Sensation to light touch is intact, no motor deficits, palpable radial pulses 2+    Radiology:     Plain radiographs not obtained.         Assessment :  Ms. Luzma Pederson is a 54 y.o. patient underwent a right shoulder open SC joint arthroplasty, arthroscopic distal clavicle excision, biceps tenodesis and rotator cuff repair on 11/1/2024. She continues to do well.      Impression:  Encounter Diagnoses   Name Primary?    S/P rotator cuff repair Yes

## 2024-12-04 ENCOUNTER — HOSPITAL ENCOUNTER (OUTPATIENT)
Dept: PHYSICAL THERAPY | Age: 54
Setting detail: THERAPIES SERIES
Discharge: HOME | End: 2024-12-04
Attending: ORTHOPAEDIC SURGERY
Payer: COMMERCIAL

## 2024-12-04 PROCEDURE — 97112 NEUROMUSCULAR REEDUCATION: CPT

## 2024-12-04 PROCEDURE — 97530 THERAPEUTIC ACTIVITIES: CPT

## 2024-12-04 PROCEDURE — 97110 THERAPEUTIC EXERCISES: CPT

## 2024-12-04 NOTE — FLOWSHEET NOTE
and proprioception, including postural re-education.    Manual Therapy (60442) as indicated to include: Passive Range of Motion, Gr I-IV mobilizations, Soft Tissue Mobilization, Dry Needling/IASTM, Trigger Point Release, and Myofascial Release  Modalities as needed that may include: Cryotherapy  Patient education on joint protection, postural re-education, activity modification, and progression of HEP    Plan: POC initiated as per evaluation    Electronically Signed by Saima Su, PT  Date: 12/04/2024     Note: Portions of this note have been templated and/or copied from initial evaluation, reassessments and prior notes for documentation efficiency.    Note: If patient does not return for scheduled/recommended follow up visits, this note will serve as a discharge from care along with the most recent update on progress.

## 2024-12-10 ENCOUNTER — HOSPITAL ENCOUNTER (OUTPATIENT)
Dept: PHYSICAL THERAPY | Age: 54
Setting detail: THERAPIES SERIES
Discharge: HOME | End: 2024-12-10
Attending: ORTHOPAEDIC SURGERY
Payer: COMMERCIAL

## 2024-12-10 PROCEDURE — 97110 THERAPEUTIC EXERCISES: CPT

## 2024-12-10 PROCEDURE — 97112 NEUROMUSCULAR REEDUCATION: CPT

## 2024-12-10 PROCEDURE — 97530 THERAPEUTIC ACTIVITIES: CPT

## 2024-12-10 NOTE — PLAN OF CARE
will demonstrate increased Strength of ER to at least 4+/5 throughout without pain to allow for proper functional mobility to enable patient to return to carrying groceries.   [x] Progressing: [] Met: [] Not Met: [] Adjusted  4. Patient will demonstrate increased AROM of shoulder IR to T10 without pain to allow for proper joint functioning to enable patient to fasten bra.    [x] Progressing: [] Met: [] Not Met: [] Adjusted  5. Patient will demonstrate increased AROM of shoulder ER to T3 without pain to allow for proper joint functioning to enable patient to wash her hair.     [x] Progressing: [] Met: [] Not Met: [] Adjusted     Overall Progression Towards Functional goals/ Treatment Progress Update:  [] Patient is progressing as expected towards functional goals listed.    [x] Progression is slowed due to complexities/Impairments listed.  [] Progression has been slowed due to co-morbidities.  [] Plan just implemented, too soon (<30days) to assess goals progression   [] Goals require adjustment due to lack of progress  [] Patient is not progressing as expected and requires additional follow up with physician  [] Other:     TREATMENT PLAN     Frequency/Duration: 1-2x/week for  15+  weeks for the following treatment interventions:    Interventions:  Therapeutic Exercise (49530) including: strength training, ROM, and functional mobility  Therapeutic Activities (67232) including: functional mobility training and education.  Neuromuscular Re-education (94358) activation and proprioception, including postural re-education.    Manual Therapy (02306) as indicated to include: Passive Range of Motion, Gr I-IV mobilizations, Soft Tissue Mobilization, Dry Needling/IASTM, Trigger Point Release, and Myofascial Release  Modalities as needed that may include: Cryotherapy  Patient education on joint protection, postural re-education, activity modification, and progression of HEP    Plan: Cont POC    Electronically Signed by Saima

## 2024-12-16 ENCOUNTER — OFFICE VISIT (OUTPATIENT)
Dept: ORTHOPEDIC SURGERY | Age: 54
End: 2024-12-16

## 2024-12-16 ENCOUNTER — HOSPITAL ENCOUNTER (OUTPATIENT)
Dept: PHYSICAL THERAPY | Age: 54
Setting detail: THERAPIES SERIES
Discharge: HOME | End: 2024-12-16
Attending: ORTHOPAEDIC SURGERY
Payer: COMMERCIAL

## 2024-12-16 VITALS — WEIGHT: 260 LBS | BODY MASS INDEX: 44.39 KG/M2 | HEIGHT: 64 IN

## 2024-12-16 DIAGNOSIS — Z98.890 S/P ROTATOR CUFF REPAIR: Primary | ICD-10-CM

## 2024-12-16 PROCEDURE — 99024 POSTOP FOLLOW-UP VISIT: CPT | Performed by: PHYSICIAN ASSISTANT

## 2024-12-16 PROCEDURE — 97110 THERAPEUTIC EXERCISES: CPT

## 2024-12-16 PROCEDURE — 97530 THERAPEUTIC ACTIVITIES: CPT

## 2024-12-16 NOTE — FLOWSHEET NOTE
Trigger Point Release, and Myofascial Release  Modalities as needed that may include: Cryotherapy  Patient education on joint protection, postural re-education, activity modification, and progression of HEP    Plan: Cont POC    Electronically Signed by Saima Su, PT  Date: 12/16/2024     Note: Portions of this note have been templated and/or copied from initial evaluation, reassessments and prior notes for documentation efficiency.    Note: If patient does not return for scheduled/recommended follow up visits, this note will serve as a discharge from care along with the most recent update on progress.

## 2024-12-16 NOTE — PROGRESS NOTES
History of Present Illness:  Luzma Pederson is a 54 y.o. female who presents for a post operative visit.  The patient underwent a right shoulder open SC joint arthroplasty, arthroscopic distal clavicle excision, biceps tenodesis and rotator cuff repair on 11/1/2024.   She is 6 weeks postop and reports everything continues to improve.  She began driving 2 weeks ago.  Patient reports that she still has pain when she moves her arm with any cross body with either shoulder can increased pain at the SC joint.  She plans to go to [place] for physical therapy. The patient denies any fevers, chills, numbness, tingling, and shortness of breath.    Medical History:  Patient's medications, allergies, past medical, surgical, social and family histories were reviewed and updated as appropriate.    Review of Systems  A 14 point review of systems was completed by the patient is available in the media section of the scanned medical record and was reviewed on 12/16/2024.      Vital Signs:  There were no vitals filed for this visit.    General/Appearance: Alert and oriented and in no apparent distress.    Skin:  There are no skin lesions, cellulitis, or extreme edema. The patient has warm and well-perfused Bilateral upper extremities with brisk capillary refill.      right Shoulder Exam:    Inspection: right shoulder incision that is clean, dry and intact and well approximated.  Swelling has improved.  There is no erythema, drainage or other signs of infection    Palpation:  No crepitus to gentle motion    Active Range of Motion: Forward elevation of 100 degrees with a soft endpoint, external rotation of 45 and internal rotation to L5    Passive Range of Motion: FE to 140    Strength:  Deferred    Special Tests:  Deferred.    Neurovascular: Sensation to light touch is intact, no motor deficits, palpable radial pulses 2+    Radiology:     Plain radiographs not obtained.         Assessment :  Ms. Luzma Pederson is a 54 y.o.

## 2024-12-18 ENCOUNTER — HOSPITAL ENCOUNTER (OUTPATIENT)
Dept: PHYSICAL THERAPY | Age: 54
Setting detail: THERAPIES SERIES
Discharge: HOME | End: 2024-12-18
Attending: ORTHOPAEDIC SURGERY
Payer: COMMERCIAL

## 2024-12-18 PROCEDURE — 97530 THERAPEUTIC ACTIVITIES: CPT

## 2024-12-18 PROCEDURE — 97110 THERAPEUTIC EXERCISES: CPT

## 2024-12-18 NOTE — FLOWSHEET NOTE
[facility]- Outpatient Rehabilitation and Therapy [address], [place] [address] office: [phone] fax: [phone]      Physical Therapy: TREATMENT/PROGRESS NOTE   Patient: Luzma Pederson (54 y.o. female)   Examination Date: 2024   :  1970 MRN: [id number]   Visit #:    Insurance Allowable Auth Needed   Auth  30 [id number] [x]Yes    []No    Insurance: Payor: [place] BCBS / Plan: BC[place] / Product Type: *No Product type* /   Insurance ID: [id number] - ([place] BCBS)  Secondary Insurance (if applicable):    Treatment Diagnosis: M25.511 Pain in right shoulder    Medical Diagnosis:  Sternoclavicular joint pain, right [M25.511]   Referring Physician: Michel Boyd MD  PCP: Sam Lazaro APRN - CNP     Plan of care signed (Y/N): Y    Date of Patient follow up with Physician: 25     Plan of Care Report: EVAL  -- PN 12/10   POC update due: (10 visits /OR AUTH LIMITS, whichever is less) 1/10/2025                                             Medical History:  Comorbidities / Relevant Medical History: HTN, osteoporosis/osteopenia, OA, hx of COVID                                          Precautions/ Contra-indications:           Latex allergy:  NO  Pacemaker:    NO  Contraindications for Manipulation: recent surgical history (relative)  Date of Surgery: 24, right shoulder open SC joint arthroplasty, arthroscopic distal clavicle excision, biceps tenodesis and rotator cuff repair   Other:  Red Flags:  None  Suicide Screening:   The patient did not verbalize a primary behavioral concern, suicidal ideation, suicidal intent, or demonstrate suicidal behaviors.    Preferred Language for Healthcare:   [x] English       [] other:    SUBJECTIVE EXAMINATION     Patient stated complaint: Patient reports some stiffness in the shoulder today. Notes the MD visit went well.     Date of Surgery: 24, right shoulder open SC joint arthroplasty, arthroscopic distal clavicle

## 2024-12-24 ENCOUNTER — HOSPITAL ENCOUNTER (OUTPATIENT)
Dept: PHYSICAL THERAPY | Age: 54
Setting detail: THERAPIES SERIES
Discharge: HOME | End: 2024-12-24
Attending: ORTHOPAEDIC SURGERY
Payer: COMMERCIAL

## 2024-12-24 PROCEDURE — 97530 THERAPEUTIC ACTIVITIES: CPT

## 2024-12-24 PROCEDURE — 97110 THERAPEUTIC EXERCISES: CPT

## 2024-12-24 NOTE — FLOWSHEET NOTE
[facility]- Outpatient Rehabilitation and Therapy [address], [place] [address] office: [phone] fax: [phone]      Physical Therapy: TREATMENT/PROGRESS NOTE   Patient: Luzma Pederson (54 y.o. female)   Examination Date: 2024   :  1970 MRN: [id number]   Visit #:    Insurance Allowable Auth Needed   Auth  30 VPCY [x]Yes    []No    Insurance: Pay[place]: [place] BCBS / Plan: [place] / Product Type: *No Product type* /   Insurance ID: [id number] - ([place] BCBS)  Secondary Insurance (if applicable):    Treatment Diagnosis: M25.511 Pain in right shoulder    Medical Diagnosis:  Sternoclavicular joint pain, right [M25.511]   Referring Physician: Michel Boyd MD  PCP: Sam Lazaro APRN - CNP     Plan of care signed (Y/N): Y    Date of Patient follow up with Physician: 25     Plan of Care Report: EVAL  -- PN 12/10   POC update due: (10 visits /OR AUTH LIMITS, whichever is less) 1/10/2025                                             Medical History:  Comorbidities / Relevant Medical History: HTN, osteoporosis/osteopenia, OA, hx of COVID                                          Precautions/ Contra-indications:           Latex allergy:  NO  Pacemaker:    NO  Contraindications for Manipulation: recent surgical history (relative)  Date of Surgery: 24, right shoulder open SC joint arthroplasty, arthroscopic distal clavicle excision, biceps tenodesis and rotator cuff repair   Other:  Red Flags:  None  Suicide Screening:   The patient did not verbalize a primary behavioral concern, suicidal ideation, suicidal intent, or demonstrate suicidal behaviors.    Preferred Language for Healthcare:   [x] English       [] other:    SUBJECTIVE EXAMINATION     Patient stated complaint: Patient denies adverse effects after the last PT session. She has tightness in the mornings but overall mobility continues to improve.     Date of Surgery: 24, right shoulder open SC

## 2024-12-26 ENCOUNTER — HOSPITAL ENCOUNTER (OUTPATIENT)
Dept: PHYSICAL THERAPY | Age: 54
Setting detail: THERAPIES SERIES
Discharge: HOME | End: 2024-12-26
Attending: ORTHOPAEDIC SURGERY
Payer: COMMERCIAL

## 2024-12-26 PROCEDURE — 97530 THERAPEUTIC ACTIVITIES: CPT

## 2024-12-26 PROCEDURE — 97110 THERAPEUTIC EXERCISES: CPT

## 2024-12-26 NOTE — FLOWSHEET NOTE
(59119)     Ultrasound (20591)    Group Therapy (91753)     Estim Attended (19425)    Canalith Repositioning (95534)     Physical Performance Test (11368)    Custom orthotic ()     Other:    Other: Cold pack 10'  x   Total Timed Code Tx Minutes 38' 3       Total Treatment Minutes 52'        Charge Justification:  (86233) THERAPEUTIC EXERCISE - Provided verbal/tactile cueing for activities related to strengthening, flexibility, endurance, ROM performed to prevent loss of range of motion, maintain or improve muscular strength or increase flexibility, following either an injury or surgery.   (93039) HOME EXERCISE PROGRAM - Reviewed/Progressed HEP activities related to strengthening, flexibility, endurance, ROM performed to prevent loss of range of motion, maintain or improve muscular strength or increase flexibility, following either an injury or surgery.  (92600) THERAPEUTIC ACTIVITY - use of dynamic activities to improve functional performance. (Ex include squatting, ascending/descending stairs, walking, bending, lifting, catching, throwing, pushing, pulling, jumping.)  Direct, one on one contact, billed in 15-minute increments.    GOALS     Patient stated goal: full range of motion  [x] Progressing: [] Met: [] Not Met: [] Adjusted    Therapist goals for Patient:   Short Term Goals: To be achieved in: 2 weeks  1. Independent in HEP and progression per patient tolerance, in order to prevent re-injury.   [x] Progressing: [] Met: [] Not Met: [] Adjusted  2. Patient will have a decrease in pain to <3/10 to facilitate improvement in movement, function, and ADLs as indicated by Functional Deficits.  [x] Progressing: [] Met: [] Not Met: [] Adjusted    Long Term Goals: To be achieved in: 20+ weeks  1. Disability index score of 25% or less for the Upper Extremity functional Scale to assist with reaching prior level of function with activities such as opening doors.  [x] Progressing: [] Met: [] Not Met: [] Adjusted  2.

## 2024-12-27 DIAGNOSIS — E55.9 VITAMIN D DEFICIENCY: ICD-10-CM

## 2024-12-30 NOTE — TELEPHONE ENCOUNTER
Medication:   Requested Prescriptions     Pending Prescriptions Disp Refills    vitamin D 25 MCG (1000 UT) CAPS [Pharmacy Med Name: VITAMIN D3 1,000 UNIT SOFTGEL] 120 capsule 0     Sig: TAKE FOUR CAPSULES BY MOUTH DAILY       Last Filled:      Patient Phone Number: [phone] (home) [phone] (work)    Last appt: 4/6/2022   Next appt: Visit date not found

## 2024-12-31 ENCOUNTER — HOSPITAL ENCOUNTER (OUTPATIENT)
Dept: PHYSICAL THERAPY | Age: 54
Setting detail: THERAPIES SERIES
Discharge: HOME | End: 2024-12-31
Attending: ORTHOPAEDIC SURGERY
Payer: COMMERCIAL

## 2024-12-31 PROCEDURE — 97530 THERAPEUTIC ACTIVITIES: CPT

## 2024-12-31 PROCEDURE — 97110 THERAPEUTIC EXERCISES: CPT

## 2024-12-31 NOTE — FLOWSHEET NOTE
[facility]- Outpatient Rehabilitation and Therapy [address], [place] [address] office: [phone] fax: [phone]      Physical Therapy: TREATMENT/PROGRESS NOTE   Patient: Luzma Pederson (54 y.o. female)   Examination Date: 2024   :  1970 MRN: [id number]   Visit #:    Insurance Allowable Auth Needed   11 visits (-2/3/35)     30 VPCY [x]Yes    []No    Insurance: Pay[place] / Plan[place] / Product Type: *No Product type* /   Insurance ID: [id number] - ([place] BCBS)  Secondary Insurance (if applicable):    Treatment Diagnosis: M25.511 Pain in right shoulder    Medical Diagnosis:  Sternoclavicular joint pain, right [M25.511]   Referring Physician: Michel Boyd MD  PCP: Sam Lazaro APRN - CNP     Plan of care signed (Y/N): Y    Date of Patient follow up with Physician: 25     Plan of Care Report: EVAL  -- PN 12/10   POC update due: (10 visits /OR AUTH LIMITS, whichever is less) 1/10/2025                                             Medical History:  Comorbidities / Relevant Medical History: HTN, osteoporosis/osteopenia, OA, hx of COVID                                          Precautions/ Contra-indications:           Latex allergy:  NO  Pacemaker:    NO  Contraindications for Manipulation: recent surgical history (relative)  Date of Surgery: 24, right shoulder open SC joint arthroplasty, arthroscopic distal clavicle excision, biceps tenodesis and rotator cuff repair   Other:  Red Flags:  None  Suicide Screening:   The patient did not verbalize a primary behavioral concern, suicidal ideation, suicidal intent, or demonstrate suicidal behaviors.    Preferred Language for Healthcare:   [x] English       [] other:    SUBJECTIVE EXAMINATION     Patient stated complaint: Patient reports the shoulder odes get a little sore when driving.      Date of Surgery: 24, right shoulder open SC joint arthroplasty, arthroscopic distal

## 2025-01-02 ENCOUNTER — HOSPITAL ENCOUNTER (OUTPATIENT)
Dept: PHYSICAL THERAPY | Age: 55
Setting detail: THERAPIES SERIES
Discharge: HOME | End: 2025-01-02
Attending: ORTHOPAEDIC SURGERY
Payer: COMMERCIAL

## 2025-01-02 PROCEDURE — 97110 THERAPEUTIC EXERCISES: CPT

## 2025-01-02 PROCEDURE — 97530 THERAPEUTIC ACTIVITIES: CPT

## 2025-01-02 NOTE — FLOWSHEET NOTE
[facility]- Outpatient Rehabilitation and Therapy [address], [place] [address] office: [phone] fax: [phone]      Physical Therapy: TREATMENT/PROGRESS NOTE   Patient: Luzma Pederson (54 y.o. female)   Examination Date: 2025   :  1970 MRN: [id number]   Visit #:      Insurance Allowable Auth Needed   11 visits (-2/3/35)    (-3/2)     30 VPCY [x]Yes    []No    Insurance: Pay[place] / Plan[place] / Product Type: *No Product type* /   Insurance ID: [id number] - ([place] BCBS)  Secondary Insurance (if applicable):    Treatment Diagnosis: M25.511 Pain in right shoulder    Medical Diagnosis:  Sternoclavicular joint pain, right [M25.511]   Referring Physician: Michel Boyd MD  PCP: Sam Lazaro APRN - CNP     Plan of care signed (Y/N): Y    Date of Patient follow up with Physician: 25     Plan of Care Report: EVAL  -- PN 12/10   POC update due: (10 visits /OR AUTH LIMITS, whichever is less) 1/10/2025                                             Medical History:  Comorbidities / Relevant Medical History: HTN, osteoporosis/osteopenia, OA, hx of COVID                                          Precautions/ Contra-indications:           Latex allergy:  NO  Pacemaker:    NO  Contraindications for Manipulation: recent surgical history (relative)  Date of Surgery: 24, right shoulder open SC joint arthroplasty, arthroscopic distal clavicle excision, biceps tenodesis and rotator cuff repair   Other:  Red Flags:  None  Suicide Screening:   The patient did not verbalize a primary behavioral concern, suicidal ideation, suicidal intent, or demonstrate suicidal behaviors.    Preferred Language for Healthcare:   [x] English       [] other:    SUBJECTIVE EXAMINATION     Patient stated complaint: Pt reports improving functional tolerance and UE endurance noted with ADLs over the holiday. Pleased with current progress.       Date of Surgery:

## 2025-01-07 ENCOUNTER — HOSPITAL ENCOUNTER (OUTPATIENT)
Dept: PHYSICAL THERAPY | Age: 55
Setting detail: THERAPIES SERIES
Discharge: HOME | End: 2025-01-07
Attending: ORTHOPAEDIC SURGERY
Payer: COMMERCIAL

## 2025-01-07 PROCEDURE — 97110 THERAPEUTIC EXERCISES: CPT

## 2025-01-07 PROCEDURE — 97530 THERAPEUTIC ACTIVITIES: CPT

## 2025-01-07 NOTE — FLOWSHEET NOTE
[facility]- Outpatient Rehabilitation and Therapy [address], [place] [address] office: [phone] fax: [phone]      Physical Therapy: TREATMENT/PROGRESS NOTE   Patient: Luzma Pederson (54 y.o. female)   Examination Date: 2025   :  1970 MRN: [id number]   Visit #:      Insurance Allowable Auth Needed   11 visits (-2/3/35)    (-3/2)     30 VPCY [x]Yes    []No    Insurance: Pay[place] / Plan[place] / Product Type: *No Product type* /   Insurance ID: [id number] - ([place] BCBS)  Secondary Insurance (if applicable):    Treatment Diagnosis: M25.511 Pain in right shoulder    Medical Diagnosis:  Sternoclavicular joint pain, right [M25.511]   Referring Physician: Michel Boyd MD  PCP: Sam Lazaro APRN - CNP     Plan of care signed (Y/N): Y    Date of Patient follow up with Physician: 25     Plan of Care Report: EVAL  -- PN 12/10   POC update due: (10 visits /OR AUTH LIMITS, whichever is less) 1/10/2025                                             Medical History:  Comorbidities / Relevant Medical History: HTN, osteoporosis/osteopenia, OA, hx of COVID                                          Precautions/ Contra-indications:           Latex allergy:  NO  Pacemaker:    NO  Contraindications for Manipulation: recent surgical history (relative)  Date of Surgery: 24, right shoulder open SC joint arthroplasty, arthroscopic distal clavicle excision, biceps tenodesis and rotator cuff repair   Other:  Red Flags:  None  Suicide Screening:   The patient did not verbalize a primary behavioral concern, suicidal ideation, suicidal intent, or demonstrate suicidal behaviors.    Preferred Language for Healthcare:   [x] English       [] other:    SUBJECTIVE EXAMINATION     Patient stated complaint: Patient reports the shoulder is a little sore from gripping the wheel hard to drive.       Date of Surgery: 24, right shoulder open SC joint

## 2025-01-09 ENCOUNTER — HOSPITAL ENCOUNTER (OUTPATIENT)
Dept: PHYSICAL THERAPY | Age: 55
Setting detail: THERAPIES SERIES
Discharge: HOME | End: 2025-01-09
Attending: ORTHOPAEDIC SURGERY
Payer: COMMERCIAL

## 2025-01-09 PROCEDURE — 97530 THERAPEUTIC ACTIVITIES: CPT

## 2025-01-09 PROCEDURE — 97110 THERAPEUTIC EXERCISES: CPT

## 2025-01-09 NOTE — FLOWSHEET NOTE
Estim Attended (96005)    Canalith Repositioning (21776)     Physical Performance Test (72260)    Custom orthotic ()     Other:    Other: Cold pack 10'  x   Total Timed Code Tx Minutes 33' 2       Total Treatment Minutes 47'        Charge Justification:  (73473) THERAPEUTIC EXERCISE - Provided verbal/tactile cueing for activities related to strengthening, flexibility, endurance, ROM performed to prevent loss of range of motion, maintain or improve muscular strength or increase flexibility, following either an injury or surgery.   (14984) HOME EXERCISE PROGRAM - Reviewed/Progressed HEP activities related to strengthening, flexibility, endurance, ROM performed to prevent loss of range of motion, maintain or improve muscular strength or increase flexibility, following either an injury or surgery.  (10353) THERAPEUTIC ACTIVITY - use of dynamic activities to improve functional performance. (Ex include squatting, ascending/descending stairs, walking, bending, lifting, catching, throwing, pushing, pulling, jumping.)  Direct, one on one contact, billed in 15-minute increments.    GOALS     Patient stated goal: full range of motion  [x] Progressing: [] Met: [] Not Met: [] Adjusted    Therapist goals for Patient:   Short Term Goals: To be achieved in: 2 weeks  1. Independent in HEP and progression per patient tolerance, in order to prevent re-injury.   [] Progressing: [x] Met: [] Not Met: [] Adjusted  2. Patient will have a decrease in pain to <3/10 to facilitate improvement in movement, function, and ADLs as indicated by Functional Deficits.  [x] Progressing: [] Met: [] Not Met: [] Adjusted    Long Term Goals: To be achieved in: 20+ weeks  1. Disability index score of 25% or less for the Upper Extremity functional Scale to assist with reaching prior level of function with activities such as opening doors.  [x] Progressing: [] Met: [] Not Met: [] Adjusted  2. Patient will demonstrate increased AROM of shoulder flexion to

## 2025-01-13 ENCOUNTER — HOSPITAL ENCOUNTER (OUTPATIENT)
Dept: PHYSICAL THERAPY | Age: 55
Setting detail: THERAPIES SERIES
Discharge: HOME | End: 2025-01-13
Attending: ORTHOPAEDIC SURGERY
Payer: COMMERCIAL

## 2025-01-13 PROCEDURE — 97110 THERAPEUTIC EXERCISES: CPT

## 2025-01-13 PROCEDURE — 97530 THERAPEUTIC ACTIVITIES: CPT

## 2025-01-13 NOTE — PLAN OF CARE
loss of range of motion, maintain or improve muscular strength or increase flexibility, following either an injury or surgery.   (11267) HOME EXERCISE PROGRAM - Reviewed/Progressed HEP activities related to strengthening, flexibility, endurance, ROM performed to prevent loss of range of motion, maintain or improve muscular strength or increase flexibility, following either an injury or surgery.  (72662) THERAPEUTIC ACTIVITY - use of dynamic activities to improve functional performance. (Ex include squatting, ascending/descending stairs, walking, bending, lifting, catching, throwing, pushing, pulling, jumping.)  Direct, one on one contact, billed in 15-minute increments.    GOALS     Patient stated goal: full range of motion  [x] Progressing: [] Met: [] Not Met: [] Adjusted    Therapist goals for Patient:   Short Term Goals: To be achieved in: 2 weeks  1. Independent in HEP and progression per patient tolerance, in order to prevent re-injury.   [] Progressing: [x] Met: [] Not Met: [] Adjusted  2. Patient will have a decrease in pain to <3/10 to facilitate improvement in movement, function, and ADLs as indicated by Functional Deficits.  [] Progressing: [x] Met: [] Not Met: [] Adjusted    Long Term Goals: To be achieved in: 20+ weeks    1. Disability index score of 25% or less for the Upper Extremity functional Scale to assist with reaching prior level of function with activities such as opening doors.  [x] Progressing: [] Met: [] Not Met: [] Adjusted  2. Patient will demonstrate increased AROM of shoulder flexion to 160 deg without pain to allow for proper joint functioning to enable patient to reach in overhead cabinet.   [x] Progressing: [] Met: [] Not Met: [] Adjusted  3. Patient will demonstrate increased Strength of ER to at least 4+/5 throughout without pain to allow for proper functional mobility to enable patient to return to carrying groceries.   [x] Progressing: [] Met: [] Not Met: [] Adjusted  4. Patient

## 2025-01-14 ENCOUNTER — OFFICE VISIT (OUTPATIENT)
Dept: ORTHOPEDIC SURGERY | Age: 55
End: 2025-01-14

## 2025-01-14 VITALS — WEIGHT: 260 LBS | HEIGHT: 64 IN | BODY MASS INDEX: 44.39 KG/M2

## 2025-01-14 DIAGNOSIS — M19.019 AC JOINT ARTHROPATHY: ICD-10-CM

## 2025-01-14 DIAGNOSIS — Z98.890 S/P SHOULDER SURGERY: ICD-10-CM

## 2025-01-14 DIAGNOSIS — Z98.890 S/P ROTATOR CUFF REPAIR: Primary | ICD-10-CM

## 2025-01-14 PROCEDURE — 99024 POSTOP FOLLOW-UP VISIT: CPT | Performed by: ORTHOPAEDIC SURGERY

## 2025-01-14 NOTE — PROGRESS NOTES
Chief Complaint    Post-Op Check (RIGHT SHOULDER)      History of Present Illness:  Luzma Pederson is a pleasant, 54 y.o., female, here today for follow up of her right shoulder. She is now 11 weeks out following a right shoulder open SC joint arthroplasty, arthroscopic distal clavicle excision, biceps tenodesis and rotator cuff repair on 11/1/24. She reports that she is doing well. She has continued in physical therapy at the [facility]. She reports no new injuries or setbacks.     Pain Assessment  Location of Pain: Shoulder  Location Modifiers: Right  Severity of Pain: 1  Quality of Pain: Sharp, Aching  Frequency of Pain: Intermittent  Aggravating Factors: Other (Comment), Exercise, Straightening, Stretching  Limiting Behavior: Some  Relieving Factors: Rest, Exercise, Other (Comment), Ice  Work-Related Injury: No  Are there other pain locations you wish to document?: No      Medical History:  Patient's medications, allergies, past medical, surgical, social and family histories were reviewed and updated as appropriate.    No notes on file    Review of Systems  A 14 point review of systems was completed by the patient and is available in the media section of the scanned medical record and was reviewed on 1/14/2025.  The review is negative with the exception of those things mentioned in the HPI and Past Medical History    Vital Signs:  There were no vitals filed for this visit.    General/Appearance: Alert and oriented and in no apparent distress.    Skin:  There are no skin lesions, cellulitis, or extreme edema. The patient has warm and well-perfused Bilateral upper extremities with brisk capillary refill.      Right Shoulder Exam:  Inspection: Raised scar over SC joint. No redness. No gross deformities, no signs of infection.    Palpation: Deferred    Active Range of Motion:  Forward Elevation 160, Internal Rotation Back pocket, Cross arm 20 cm    Passive Range of Motion: Deferred    Strength:

## 2025-01-16 ENCOUNTER — HOSPITAL ENCOUNTER (OUTPATIENT)
Dept: PHYSICAL THERAPY | Age: 55
Setting detail: THERAPIES SERIES
Discharge: HOME | End: 2025-01-16
Attending: ORTHOPAEDIC SURGERY
Payer: COMMERCIAL

## 2025-01-16 PROCEDURE — 97112 NEUROMUSCULAR REEDUCATION: CPT

## 2025-01-16 PROCEDURE — 97110 THERAPEUTIC EXERCISES: CPT

## 2025-01-16 PROCEDURE — 97530 THERAPEUTIC ACTIVITIES: CPT

## 2025-01-16 NOTE — FLOWSHEET NOTE
(35603)     Iontophoresis (32656)    VASO (45643)     Ultrasound (72590)    Group Therapy (43706)     Estim Attended (14183)    Canalith Repositioning (04606)     Physical Performance Test (59779)    Custom orthotic ()     Other:    Other: Cold pack 10'  x   Total Timed Code Tx Minutes 41' 3       Total Treatment Minutes 55'        Charge Justification:  (49269) THERAPEUTIC EXERCISE - Provided verbal/tactile cueing for activities related to strengthening, flexibility, endurance, ROM performed to prevent loss of range of motion, maintain or improve muscular strength or increase flexibility, following either an injury or surgery.   (76894) HOME EXERCISE PROGRAM - Reviewed/Progressed HEP activities related to strengthening, flexibility, endurance, ROM performed to prevent loss of range of motion, maintain or improve muscular strength or increase flexibility, following either an injury or surgery.  (32124) NEUROMUSCULAR RE-EDUCATION - Therapeutic procedure, 1 or more areas, each 15 minutes; neuromuscular reeducation of movement, balance, coordination, kinesthetic sense, posture, and/or proprioception for sitting and/or standing activities  (69723) THERAPEUTIC ACTIVITY - use of dynamic activities to improve functional performance. (Ex include squatting, ascending/descending stairs, walking, bending, lifting, catching, throwing, pushing, pulling, jumping.)  Direct, one on one contact, billed in 15-minute increments.    GOALS     Patient stated goal: full range of motion  [x] Progressing: [] Met: [] Not Met: [] Adjusted    Therapist goals for Patient:   Short Term Goals: To be achieved in: 2 weeks  1. Independent in HEP and progression per patient tolerance, in order to prevent re-injury.   [] Progressing: [x] Met: [] Not Met: [] Adjusted  2. Patient will have a decrease in pain to <3/10 to facilitate improvement in movement, function, and ADLs as indicated by Functional Deficits.  [] Progressing: [x] Met: [] Not Met:

## 2025-01-21 ENCOUNTER — HOSPITAL ENCOUNTER (OUTPATIENT)
Dept: PHYSICAL THERAPY | Age: 55
Setting detail: THERAPIES SERIES
End: 2025-01-21
Attending: ORTHOPAEDIC SURGERY
Payer: COMMERCIAL

## 2025-01-23 ENCOUNTER — APPOINTMENT (OUTPATIENT)
Dept: PHYSICAL THERAPY | Age: 55
End: 2025-01-23
Attending: ORTHOPAEDIC SURGERY
Payer: COMMERCIAL

## 2025-02-04 ENCOUNTER — HOSPITAL ENCOUNTER (OUTPATIENT)
Dept: PHYSICAL THERAPY | Age: 55
Setting detail: THERAPIES SERIES
Discharge: HOME | End: 2025-02-04
Attending: ORTHOPAEDIC SURGERY
Payer: COMMERCIAL

## 2025-02-04 PROCEDURE — 97530 THERAPEUTIC ACTIVITIES: CPT

## 2025-02-04 PROCEDURE — 97110 THERAPEUTIC EXERCISES: CPT

## 2025-02-04 PROCEDURE — 97112 NEUROMUSCULAR REEDUCATION: CPT

## 2025-02-04 NOTE — FLOWSHEET NOTE
and ADLs as indicated by Functional Deficits.  [] Progressing: [x] Met: [] Not Met: [] Adjusted    Long Term Goals: To be achieved in: 20+ weeks    1. Disability index score of 25% or less for the Upper Extremity functional Scale to assist with reaching prior level of function with activities such as opening doors.  [x] Progressing: [] Met: [] Not Met: [] Adjusted  2. Patient will demonstrate increased AROM of shoulder flexion to 160 deg without pain to allow for proper joint functioning to enable patient to reach in overhead cabinet.   [x] Progressing: [] Met: [] Not Met: [] Adjusted  3. Patient will demonstrate increased Strength of ER to at least 4+/5 throughout without pain to allow for proper functional mobility to enable patient to return to carrying groceries.   [x] Progressing: [] Met: [] Not Met: [] Adjusted  4. Patient will demonstrate increased AROM of shoulder IR to T10 without pain to allow for proper joint functioning to enable patient to fasten bra.    [x] Progressing: [] Met: [] Not Met: [] Adjusted  5. Patient will demonstrate increased AROM of shoulder ER to T3 without pain to allow for proper joint functioning to enable patient to wash her hair.     [x] Progressing: [] Met: [] Not Met: [] Adjusted     Overall Progression Towards Functional goals/ Treatment Progress Update:  [x] Patient is progressing as expected towards functional goals listed.    [] Progression is slowed due to complexities/Impairments listed.  [] Progression has been slowed due to co-morbidities.  [] Plan just implemented, too soon (<30days) to assess goals progression   [] Goals require adjustment due to lack of progress  [] Patient is not progressing as expected and requires additional follow up with physician  [] Other:     TREATMENT PLAN     Frequency/Duration: 1-2x/week for  11+  weeks for the following treatment interventions:    Interventions:  Therapeutic Exercise (99669) including: strength training, ROM, and functional

## 2025-02-06 ENCOUNTER — HOSPITAL ENCOUNTER (OUTPATIENT)
Dept: PHYSICAL THERAPY | Age: 55
Setting detail: THERAPIES SERIES
Discharge: HOME | End: 2025-02-06
Attending: ORTHOPAEDIC SURGERY
Payer: COMMERCIAL

## 2025-02-06 PROCEDURE — 97112 NEUROMUSCULAR REEDUCATION: CPT

## 2025-02-06 PROCEDURE — 97530 THERAPEUTIC ACTIVITIES: CPT

## 2025-02-06 PROCEDURE — 97110 THERAPEUTIC EXERCISES: CPT

## 2025-02-06 NOTE — FLOWSHEET NOTE
[] Adjusted    Long Term Goals: To be achieved in: 20+ weeks    1. Disability index score of 25% or less for the Upper Extremity functional Scale to assist with reaching prior level of function with activities such as opening doors.  [x] Progressing: [] Met: [] Not Met: [] Adjusted  2. Patient will demonstrate increased AROM of shoulder flexion to 160 deg without pain to allow for proper joint functioning to enable patient to reach in overhead cabinet.   [x] Progressing: [] Met: [] Not Met: [] Adjusted  3. Patient will demonstrate increased Strength of ER to at least 4+/5 throughout without pain to allow for proper functional mobility to enable patient to return to carrying groceries.   [x] Progressing: [] Met: [] Not Met: [] Adjusted  4. Patient will demonstrate increased AROM of shoulder IR to T10 without pain to allow for proper joint functioning to enable patient to fasten bra.    [x] Progressing: [] Met: [] Not Met: [] Adjusted  5. Patient will demonstrate increased AROM of shoulder ER to T3 without pain to allow for proper joint functioning to enable patient to wash her hair.     [x] Progressing: [] Met: [] Not Met: [] Adjusted     Overall Progression Towards Functional goals/ Treatment Progress Update:  [x] Patient is progressing as expected towards functional goals listed.    [] Progression is slowed due to complexities/Impairments listed.  [] Progression has been slowed due to co-morbidities.  [] Plan just implemented, too soon (<30days) to assess goals progression   [] Goals require adjustment due to lack of progress  [] Patient is not progressing as expected and requires additional follow up with physician  [] Other:     TREATMENT PLAN     Frequency/Duration: 1-2x/week for  11+  weeks for the following treatment interventions:    Interventions:  Therapeutic Exercise (12871) including: strength training, ROM, and functional mobility  Therapeutic Activities (15083) including: functional mobility training

## 2025-02-11 ENCOUNTER — APPOINTMENT (OUTPATIENT)
Dept: PHYSICAL THERAPY | Age: 55
End: 2025-02-11
Attending: ORTHOPAEDIC SURGERY
Payer: COMMERCIAL

## 2025-02-13 ENCOUNTER — APPOINTMENT (OUTPATIENT)
Dept: PHYSICAL THERAPY | Age: 55
End: 2025-02-13
Attending: ORTHOPAEDIC SURGERY
Payer: COMMERCIAL

## 2025-02-18 ENCOUNTER — APPOINTMENT (OUTPATIENT)
Dept: PHYSICAL THERAPY | Age: 55
End: 2025-02-18
Attending: ORTHOPAEDIC SURGERY
Payer: COMMERCIAL

## 2025-02-20 ENCOUNTER — APPOINTMENT (OUTPATIENT)
Dept: PHYSICAL THERAPY | Age: 55
End: 2025-02-20
Attending: ORTHOPAEDIC SURGERY
Payer: COMMERCIAL

## 2025-02-25 ENCOUNTER — OFFICE VISIT (OUTPATIENT)
Dept: ORTHOPEDIC SURGERY | Age: 55
End: 2025-02-25
Payer: COMMERCIAL

## 2025-02-25 VITALS — WEIGHT: 260 LBS | BODY MASS INDEX: 44.39 KG/M2 | HEIGHT: 64 IN

## 2025-02-25 DIAGNOSIS — Z98.890 S/P ROTATOR CUFF REPAIR: ICD-10-CM

## 2025-02-25 DIAGNOSIS — Z98.890 S/P SHOULDER SURGERY: Primary | ICD-10-CM

## 2025-02-25 DIAGNOSIS — M19.019 AC JOINT ARTHROPATHY: ICD-10-CM

## 2025-02-25 DIAGNOSIS — M25.511 STERNOCLAVICULAR JOINT PAIN, RIGHT: ICD-10-CM

## 2025-02-25 PROCEDURE — 99213 OFFICE O/P EST LOW 20 MIN: CPT | Performed by: ORTHOPAEDIC SURGERY

## 2025-03-03 NOTE — PROGRESS NOTES
program. Follow-up will be strictly as needed. She is in agreement with this plan and all of her questions were answered today.    Michel Boyd MD, PhD  2/25/2025

## 2025-03-04 ENCOUNTER — HOSPITAL ENCOUNTER (OUTPATIENT)
Dept: PHYSICAL THERAPY | Age: 55
Setting detail: THERAPIES SERIES
Discharge: HOME | End: 2025-03-04
Attending: ORTHOPAEDIC SURGERY
Payer: COMMERCIAL

## 2025-03-04 PROCEDURE — 97530 THERAPEUTIC ACTIVITIES: CPT

## 2025-03-04 PROCEDURE — 97110 THERAPEUTIC EXERCISES: CPT

## 2025-03-04 NOTE — PLAN OF CARE
[facility] [address], [place] [address] office: [phone] fax: [phone]    Physical Therapy Re-Certification Plan of Care/MD UPDATE      Dear  Dr. Boyd,    We had the pleasure of treating the following patient for physical therapy services at [facility].  A summary of our findings can be found in the updated assessment below.  This includes our plan of care.  If you have any questions or concerns regarding these findings, please do not hesitate to contact me at the office phone number checked above.  Thank you for the referral.     Physician Signature:________________________________Date:__________________  By signing above (or electronic signature), therapist’s plan is approved by physician    Date Range Of Visits: 11/6/24-3/4/25  Total Visits to Date: 19  Overall Response to Treatment:   [x]Patient is responding well to treatment and improvement is noted with regards  to goals   []Patient should continue to improve in reasonable time if they continue HEP   []Patient has plateaued and is no longer responding to skilled PT intervention    []Patient is getting worse and would benefit from return to referring MD   []Patient unable to adhere to initial POC   [x]Other: Patient is 4+ months s/p right shoulder open SC joint arthroplasty, arthroscopic distal clavicle excision, biceps tenodesis and rotator cuff repair. Pain is largely absent with ADLs and patient feels minimal limitation solely with heavy overhead lifting. ROM this date: flexion (150 deg), abduction (150 deg), ER (T4), and IR (T10). Strength tests 5/5 for global RUE. Patient rates herself at a 1% deficit on the UEFI. She was provided increased strengthening in HEP handout and was educated on the importance of rest days with her strengthening HEP. Patient will transition to HEP at this time and was educated to call the physical therapist if questions or

## 2025-04-15 ENCOUNTER — OFFICE VISIT (OUTPATIENT)
Dept: ORTHOPEDIC SURGERY | Age: 55
End: 2025-04-15
Payer: COMMERCIAL

## 2025-04-15 VITALS — HEIGHT: 64 IN | WEIGHT: 272 LBS | BODY MASS INDEX: 46.44 KG/M2

## 2025-04-15 DIAGNOSIS — M47.816 LUMBAR SPONDYLOSIS: Primary | ICD-10-CM

## 2025-04-15 DIAGNOSIS — M43.16 SPONDYLOLISTHESIS OF LUMBAR REGION: ICD-10-CM

## 2025-04-15 PROCEDURE — 99203 OFFICE O/P NEW LOW 30 MIN: CPT | Performed by: PHYSICIAN ASSISTANT

## 2025-04-15 NOTE — PROGRESS NOTES
History of present illness:   Ms. Luzma Pederson is a pleasant 54 y.o. female kindly referred by DOMINGA Martinez for consultation regarding her LBP.   She states her pain began gradually off-and-on for several years..  Pain has steadily worsened over the past  months.    Back pain 2/10 VAS.  Denies any significant leg pain or radicular symptoms.   Describes the pain as Aches.   Pain is worse with extension and better with forward flexion.   Denies numbness and tingling into the lower extremities.    Denies weakness of her left or right leg.  Denies bowel or bladder dysfunction and saddle anesthesia.   Can sit for a maximum of 60 minutes and stand for a maximum 10 minutes.   The pain does affect sleep.   Prior medications and treatment have included ibuprofen with some relief.          Past medical history:  Her past medical history has been reviewed.  Past Medical History:   Diagnosis Date    Arthritis     HIGH CHOLESTEROL     Hyperlipidemia     Hypertension     Vitamin D deficiency     Wears contact lenses     Wears glasses        Her past surgical history has been reviewed.  Past Surgical History:   Procedure Laterality Date    CHOLECYSTECTOMY  1998    CLAVICLE SURGERY Right 11/1/2024    RIGHT SHOUKLDER STERNOCLAVICULAR JOINT ARTHROPLASTY, RIGHT SHOULDER ARTHROSCOPY, ARTHROSCOPIC EXTENSIVE DEBRIDEMENT, DECOMPRESSION,DISTAL CLAVICLE EXCISION, ARTHROSCOPIC BICEPS TENODESIS, ARTHROSCOPIC LABRAL REPAIR performed by Michel Boyd MD at [facility] OR    COLONOSCOPY      ENDOMETRIAL ABLATION      ENDOSCOPY, COLON, DIAGNOSTIC      FOOT SURGERY Right 05/2021    bunion, hammer toe, metatarsal surgery    FOOT SURGERY Left     bunion and metatarsal    VENTRAL HERNIA REPAIR           Her medications and allergies were reviewed.  Current Outpatient Medications   Medication Sig Dispense Refill    vitamin D 25 MCG (1000 UT) CAPS TAKE FOUR CAPSULES BY MOUTH DAILY 120 capsule 0    rosuvastatin (CRESTOR) 10 MG tablet TAKE ONE

## 2025-05-14 ENCOUNTER — OFFICE VISIT (OUTPATIENT)
Dept: ORTHOPEDIC SURGERY | Age: 55
End: 2025-05-14
Payer: COMMERCIAL

## 2025-05-14 VITALS — HEIGHT: 64 IN | BODY MASS INDEX: 46.48 KG/M2

## 2025-05-14 DIAGNOSIS — M47.816 LUMBAR SPONDYLOSIS: Primary | ICD-10-CM

## 2025-05-14 DIAGNOSIS — M43.16 SPONDYLOLISTHESIS OF LUMBAR REGION: ICD-10-CM

## 2025-05-14 PROCEDURE — 99213 OFFICE O/P EST LOW 20 MIN: CPT | Performed by: PHYSICIAN ASSISTANT

## 2025-05-14 NOTE — PROGRESS NOTES
Subjective:      Patient ID: Luzma Pederson is a 54 y.o. female who is here for follow up evaluation of low back pain.  She completed physical therapy with less frequent episodes but still with the same intensity.    HPI 4/15/2025:  Ms. Luzma Pederson is a pleasant 54 y.o. female kindly referred by DOMINGA Martinez for consultation regarding her LBP.   She states her pain began gradually off-and-on for several years..  Pain has steadily worsened over the past  months.    Back pain 2/10 VAS.  Denies any significant leg pain or radicular symptoms.   Describes the pain as Aches.   Pain is worse with extension and better with forward flexion.   Denies numbness and tingling into the lower extremities.    Denies weakness of her left or right leg.  Denies bowel or bladder dysfunction and saddle anesthesia.   Can sit for a maximum of 60 minutes and stand for a maximum 10 minutes.   The pain does affect sleep.   Prior medications and treatment have included ibuprofen with some relief.      Review Of Systems:   Significant for back pain and negative for recent weight loss, fatigue, chills, visual disturbances, blood in stool or urine, recent infection.        Past Medical History:   Diagnosis Date    Arthritis     HIGH CHOLESTEROL     Hyperlipidemia     Hypertension     Vitamin D deficiency     Wears contact lenses     Wears glasses        Family History   Problem Relation Age of Onset    High Blood Pressure Mother     High Cholesterol Mother     COPD Mother     Cancer Mother         lung    High Blood Pressure Father     High Cholesterol Father     Cancer Father         kidney, lung, brain    Other Maternal Grandmother         chf    Cancer Maternal Grandfather         Lung    Cancer Paternal Grandmother         Pancreatic?    Stroke Paternal Grandfather 75       Past Surgical History:   Procedure Laterality Date    CHOLECYSTECTOMY  1998    CLAVICLE SURGERY Right 11/1/2024    RIGHT SHOUKLDER STERNOCLAVICULAR

## 2025-05-19 ENCOUNTER — PATIENT MESSAGE (OUTPATIENT)
Dept: ORTHOPEDIC SURGERY | Age: 55
End: 2025-05-19

## 2025-05-19 DIAGNOSIS — M43.16 SPONDYLOLISTHESIS OF LUMBAR REGION: Primary | ICD-10-CM

## 2025-05-19 DIAGNOSIS — M47.816 LUMBAR SPONDYLOSIS: ICD-10-CM

## 2025-05-19 NOTE — TELEPHONE ENCOUNTER
Called and discussed MRI results.  CONCLUSION:   1. Left foraminal L3-4 disc herniation projecting into the left L3-4 foramen.   2. Degenerative disc disease and spondylosis involving lumbar spine producing mild L4-5 central spinal stenosis.   3. Mild bilateral L4-5 foraminal stenosis and moderate bilateral L4-5 recess stenosis.   4. 3 mm anterolisthesis of L4 on L5 with no spondylolysis.   Thank you for the opportunity to provide your interpretation.   Tate Gooden MD     She may benefit other therapeutic options such as epidural steroid injection, facet injection or other interventional procedures.  For this reason, I am going to refer to Dr. Iliana Guy for Interventional Pain Management for an evaluation and treatment.    Please make referral to Dr ROBBINS

## 2025-05-30 ENCOUNTER — OFFICE VISIT (OUTPATIENT)
Dept: BARIATRICS/WEIGHT MGMT | Age: 55
End: 2025-05-30

## 2025-05-30 VITALS
OXYGEN SATURATION: 98 % | BODY MASS INDEX: 47.7 KG/M2 | HEART RATE: 87 BPM | SYSTOLIC BLOOD PRESSURE: 123 MMHG | HEIGHT: 63 IN | DIASTOLIC BLOOD PRESSURE: 73 MMHG | RESPIRATION RATE: 18 BRPM | WEIGHT: 269.2 LBS

## 2025-05-30 DIAGNOSIS — E66.01 MORBID OBESITY WITH BMI OF 45.0-49.9, ADULT (HCC): Primary | ICD-10-CM

## 2025-05-30 NOTE — PROGRESS NOTES
Luzma Pederson is a 54 y.o. female with a date of birth of 1970.    Vitals:    05/30/25 1240   BP: 123/73   Pulse: 87   Weight: 122.1 kg (269 lb 3.2 oz)   Height: 1.607 m (5' 3.25\")    BMI: Body mass index is 47.31 kg/m². Obesity Classification: Class III    Weight History:   Wt Readings from Last 3 Encounters:   05/30/25 122.1 kg (269 lb 3.2 oz)   04/16/25 122.8 kg (270 lb 12.8 oz)   04/15/25 123.4 kg (272 lb)       Pt attended Medical Weight Management Seminar. Patient was educated on low-carb diet protocol. Nutrition and habit guidelines were discussed and written information was provided. Bariatric Nutrition Questionnaire completed during class and scanned into media.       Goals  Weight: 165 lbs  Health Improvement: lower blood pressure & cholesterol, more energy, decrease pain in back/joints    Assessment  Nutritional Needs: RMR=(9.99 x 122.1) + (6.25 x 160.7) - (4.92 x 54 y.o.) -161  = 1787 kcal x 1.3 (sedentary activity factor)= 2323 kcal - 1000 (for 2 lb weight loss/week)= 1323 kcal.    Patient has participated in the following weight loss programs: Weight Watcher Anonymous and Fasting.   Patient has not participated in meal replacement/liquid diets.  Patient has not participated in weight loss medications.  Patient does not have history of bariatric surgery.     Pt does not have food allergies.   Pt unsure if a fast eater. Pt reports hx of eating out of stress/emotions, boredom, and grazing on food.     Plan  Plan/Recommendations: Start 7198-0361 kcal LCMP  Optifast:  Pt interested  Diet Medications:  Pt interested  Bariatric Surgery:  Not interested   1:1 RD Visit:  Pt interested    PES Statement: Overweight/Obesity related to lack of exercise, sedentary lifestyle, unhealthy eating habits, and unsuccessful diet attempts as evidenced by BMI. Body mass index is 47.31 kg/m².    Handouts: New Pt Pkt, Protein Bars/Shakes     Will follow up as necessary.    Sri Stiles RD

## 2025-06-02 ENCOUNTER — TELEMEDICINE (OUTPATIENT)
Dept: BARIATRICS/WEIGHT MGMT | Age: 55
End: 2025-06-02
Payer: COMMERCIAL

## 2025-06-02 DIAGNOSIS — Z71.3 DIETARY COUNSELING AND SURVEILLANCE: ICD-10-CM

## 2025-06-02 DIAGNOSIS — E66.01 MORBID OBESITY WITH BMI OF 45.0-49.9, ADULT (HCC): Primary | ICD-10-CM

## 2025-06-02 PROCEDURE — 99204 OFFICE O/P NEW MOD 45 MIN: CPT | Performed by: FAMILY MEDICINE

## 2025-06-02 ASSESSMENT — ENCOUNTER SYMPTOMS
ABDOMINAL PAIN: 0
VOMITING: 0
CHEST TIGHTNESS: 0
SHORTNESS OF BREATH: 0
APNEA: 0
EYE PAIN: 0
CONSTIPATION: 0
CHOKING: 0
NAUSEA: 0
PHOTOPHOBIA: 0
COUGH: 0
BLOOD IN STOOL: 0
ABDOMINAL DISTENTION: 0
WHEEZING: 0
DIARRHEA: 0

## 2025-06-02 NOTE — PROGRESS NOTES
Patient: Luzma Pederson     Encounter Date: 6/2/2025    YOB: 1970               Age: 54 y.o.        Patient identification was verified at the start of the visit.         5/27/2025    12:46 PM   Patient-Reported Vitals   Patient-Reported Weight 269   Patient-Reported Height 5'4\"         BP Readings from Last 1 Encounters:   05/30/25 123/73       BMI Readings from Last 1 Encounters:   05/30/25 47.31 kg/m²       Pulse Readings from Last 1 Encounters:   05/30/25 87                                             Wt Readings from Last 3 Encounters:   05/30/25 122.1 kg (269 lb 3.2 oz)   04/16/25 122.8 kg (270 lb 12.8 oz)   04/15/25 123.4 kg (272 lb)        Chief Complaint   Patient presents with    Bariatric, Initial Visit     MWMICHELLE- NP        HPI:    54 y.o. female presents to establish care via video visit. She was referred by Sam Lazaro NP for weight management. The patient's medical history is significant for class III obesity. The patient has a long-standing history of obesity which started gradually. The problem is severe.  The patient has been gaining weight.  Risk factors include annual weight gain of >2 lbs (1 kg)/ year and sedentary lifestyle. Aggravating factors include poor diet and lack of physical activity. The patient has tried various diet/exercise plans which have been ineffective in the long-run. she is motivated to start losing weight to help improve her overall health.     When did you become overweight?  [] Childhood   [] Teens   [x] Adulthood   [] Pregnancy   [] Menopause    Highest adult weight: 270 pounds at age 54    Triggers for weight gain?   [] Stress   [] Illness   [] Medications   [] Travel  []Injury     [] Nightshift work   [] Insomnia  [x] No specific triggers   [] Other    Food triggers:   [x] Stress   [x] Boredom   [] Fast food   [] Eating out   [] Seeking reward   [] Social     Have you ever taken prescription medications to help you lose weight?   [] Yes  [x] No    Have

## 2025-06-10 DIAGNOSIS — E66.01 MORBID OBESITY WITH BMI OF 45.0-49.9, ADULT (HCC): ICD-10-CM

## 2025-06-10 LAB
FOLATE SERPL-MCNC: 35.6 NG/ML (ref 4.78–24.2)
TSH SERPL DL<=0.005 MIU/L-ACNC: 1.19 UIU/ML (ref 0.27–4.2)
VIT B12 SERPL-MCNC: 892 PG/ML (ref 211–911)

## 2025-06-19 ENCOUNTER — TELEPHONE (OUTPATIENT)
Dept: BARIATRICS/WEIGHT MGMT | Age: 55
End: 2025-06-19

## 2025-06-19 ENCOUNTER — TELEMEDICINE (OUTPATIENT)
Dept: BARIATRICS/WEIGHT MGMT | Age: 55
End: 2025-06-19
Payer: COMMERCIAL

## 2025-06-19 DIAGNOSIS — E66.01 MORBID OBESITY WITH BMI OF 45.0-49.9, ADULT (HCC): Primary | ICD-10-CM

## 2025-06-19 DIAGNOSIS — Z71.3 DIETARY COUNSELING AND SURVEILLANCE: ICD-10-CM

## 2025-06-19 DIAGNOSIS — R73.03 PREDIABETES: ICD-10-CM

## 2025-06-19 PROCEDURE — 99214 OFFICE O/P EST MOD 30 MIN: CPT | Performed by: FAMILY MEDICINE

## 2025-06-19 ASSESSMENT — ENCOUNTER SYMPTOMS
NAUSEA: 0
WHEEZING: 0
VOMITING: 0
CONSTIPATION: 0
CHOKING: 0
SHORTNESS OF BREATH: 0
EYE PAIN: 0
BLOOD IN STOOL: 0
COUGH: 0
APNEA: 0
ABDOMINAL DISTENTION: 0
ABDOMINAL PAIN: 0
CHEST TIGHTNESS: 0
PHOTOPHOBIA: 0
DIARRHEA: 0

## 2025-06-19 NOTE — PROGRESS NOTES
Patient: Luzma Pederson                      Encounter Date: 6/19/2025    YOB: 1970                Age: 54 y.o.    Chief Complaint   Patient presents with    Weight Management     F/u MWM          Patient identification was verified at the start of the visit.         6/18/2025    10:34 AM   Patient-Reported Vitals   Patient-Reported Weight 267   Patient-Reported Height 5'3\"         BP Readings from Last 1 Encounters:   05/30/25 123/73       BMI Readings from Last 1 Encounters:   05/30/25 47.31 kg/m²       Pulse Readings from Last 1 Encounters:   05/30/25 87          Wt Readings from Last 3 Encounters:   05/30/25 122.1 kg (269 lb 3.2 oz)   04/16/25 122.8 kg (270 lb 12.8 oz)   04/15/25 123.4 kg (272 lb)        HPI: 54 y.o. female with a long-standing history of obesity presents today for virtual video follow-up. Her weight is stable from her last visit. No significant changes in diet or exercise. She is interested in starting on antiobesity medications to help with weight loss.    Labs reviewed with patient.    Allergies   Allergen Reactions    Tetanus Toxoids Other (See Comments)     Hot, swollen, tender         Current Outpatient Medications:     Semaglutide-Weight Management (WEGOVY) 0.25 MG/0.5ML SOAJ SC injection, Inject 0.25 mg into the skin every 7 days, Disp: 2 mL, Rfl: 0    vitamin D 25 MCG (1000 UT) CAPS, TAKE 4 CAPSULES BY MOUTH DAILY, Disp: 120 capsule, Rfl: 5    ferrous sulfate (IRON 325) 325 (65 Fe) MG tablet, Take 1 tablet by mouth every other day, Disp: 45 tablet, Rfl: 1    lisinopril (PRINIVIL;ZESTRIL) 10 MG tablet, Take 1 tablet by mouth daily, Disp: 90 tablet, Rfl: 1    rosuvastatin (CRESTOR) 10 MG tablet, Take 1 tablet by mouth nightly, Disp: 90 tablet, Rfl: 1    Calcium Carb-Cholecalciferol (CALTRATE 600+D3 PO), Take 1 tablet by mouth daily, Disp: , Rfl:     Multiple Vitamins-Minerals (ONE DAILY MULTIVITAMIN WOMEN) TABS, , Disp: , Rfl:     Patient Active Problem List   Diagnosis

## 2025-06-19 NOTE — TELEPHONE ENCOUNTER
Per Dr. Prince; patient advised to contact the office to schedule a 4 week follow-up appointment once Wegovy medication has been picked up

## 2025-06-20 NOTE — TELEPHONE ENCOUNTER
Submitted PA for Wegovy 0.25MG/0.5ML auto-injectors  Via [facility] [id number] STATUS: PENDING.    Follow up done daily; if no decision with in three days we will refax.  If another three days goes by with no decision will call the insurance for status.

## 2025-06-20 NOTE — TELEPHONE ENCOUNTER
Submitted PA for Wegovy 0.25MG/0.5ML auto-injectors  Via [facility] [id number] STATUS: PENDING.    Has the patient achieved an exercise goal of completing at least 150 minutes of exercise per week for at least 6 months?*     If this requires a response please respond to the pool ( P MHCX PSC MEDICATION PRE-AUTH).      Thank you please advise patient.

## 2025-07-09 ENCOUNTER — HOSPITAL ENCOUNTER (OUTPATIENT)
Dept: WOMENS IMAGING | Age: 55
Discharge: HOME | End: 2025-07-09
Payer: COMMERCIAL

## 2025-07-09 VITALS — WEIGHT: 269 LBS | BODY MASS INDEX: 47.66 KG/M2 | HEIGHT: 63 IN

## 2025-07-09 DIAGNOSIS — Z12.31 SCREENING MAMMOGRAM, ENCOUNTER FOR: ICD-10-CM

## 2025-07-09 PROCEDURE — 77067 SCR MAMMO BI INCL CAD: CPT

## 2025-07-22 ENCOUNTER — TELEPHONE (OUTPATIENT)
Dept: BARIATRICS/WEIGHT MGMT | Age: 55
End: 2025-07-22

## 2025-07-22 ENCOUNTER — TELEMEDICINE (OUTPATIENT)
Dept: BARIATRICS/WEIGHT MGMT | Age: 55
End: 2025-07-22
Payer: COMMERCIAL

## 2025-07-22 DIAGNOSIS — Z71.3 DIETARY COUNSELING AND SURVEILLANCE: ICD-10-CM

## 2025-07-22 DIAGNOSIS — E66.01 MORBID OBESITY WITH BMI OF 45.0-49.9, ADULT (HCC): Primary | ICD-10-CM

## 2025-07-22 PROCEDURE — 99214 OFFICE O/P EST MOD 30 MIN: CPT | Performed by: FAMILY MEDICINE

## 2025-07-22 NOTE — PROGRESS NOTES
[] Low-calorie diet    [] Maintenance       []Other        Exercise: [x] Cardio     [x] Resistance/strength training                       [x] ACSM recommendations (150 minutes/week in active weight loss)               Behavior: [x] Motivational interviewing performed    [] Referral for counseling                         [x] Discussed strategies to overcome habits/challenges for focus         [] Stress management   [x] Stimulus control         [] Sleep hygiene      Reviewed:  [x] Nutrition and the importance of regular protein intake  [x] Hidden carbohydrate sources  [x] Alcohol use  [x] Tobacco use   [x] Drug use- Denies  [x] Importance of exercise and reducing sedentary time  [x] Treatment consent- Patient understands and agrees with the treatment plan   [x] Proper use of medication and side effects      Treatment start date: 6/20/25  Starting weight: 267 pounds    Total weight loss: 13 pounds     Key dietary points:    - Meats (preferably organic or grass fed) are great sources of protein and have no carbohydrates.  - Recommend coconut, olive, avocado, or almond oils.  - Choose vegetables that grow above ground as they are generally lower in carbohydrates and higher in fiber.  - Avoid starches such as bread, rice, potatoes, pasta and all sources of simple sugars (desserts, soda, breakfast cereals).  - Choose beverages that are calorie and sugar free.        No orders of the defined types were placed in this encounter.      Return in about 4 weeks (around 8/19/2025) for MWM, meds.    Luzma Pederson is a 55 y.o. female being evaluated by a Virtual Visit (video visit) encounter to address concerns as mentioned above.  A caregiver was present when appropriate. Due to this being a TeleHealth encounter evaluation of the following organ systems was limited: Vitals/Constitutional/EENT/Resp/CV/GI//MS/Neuro/Skin/Heme-Lymph-Imm.      Luzma Pederson, was evaluated through a synchronous (real-time) audio-video

## 2025-07-24 ENCOUNTER — CLINICAL DOCUMENTATION (OUTPATIENT)
Dept: BARIATRICS/WEIGHT MGMT | Age: 55
End: 2025-07-24

## 2025-07-24 NOTE — TELEPHONE ENCOUNTER
Consult completed, see encounter note.     Pt requesting the form to start at [facility].

## 2025-08-19 ENCOUNTER — TELEMEDICINE (OUTPATIENT)
Dept: BARIATRICS/WEIGHT MGMT | Age: 55
End: 2025-08-19
Payer: COMMERCIAL

## 2025-08-19 DIAGNOSIS — Z71.3 DIETARY COUNSELING AND SURVEILLANCE: ICD-10-CM

## 2025-08-19 DIAGNOSIS — E66.01 MORBID OBESITY WITH BMI OF 45.0-49.9, ADULT (HCC): Primary | ICD-10-CM

## 2025-08-19 PROCEDURE — 99214 OFFICE O/P EST MOD 30 MIN: CPT | Performed by: FAMILY MEDICINE

## 2025-08-19 ASSESSMENT — ENCOUNTER SYMPTOMS
EYE PAIN: 0
CHOKING: 0
CONSTIPATION: 0
NAUSEA: 0
APNEA: 0
VOMITING: 0
CHEST TIGHTNESS: 0
COUGH: 0
DIARRHEA: 0
BLOOD IN STOOL: 0
WHEEZING: 0
SHORTNESS OF BREATH: 0
ABDOMINAL DISTENTION: 0
PHOTOPHOBIA: 0
ABDOMINAL PAIN: 0

## 2025-08-20 ENCOUNTER — TELEPHONE (OUTPATIENT)
Dept: BARIATRICS/WEIGHT MGMT | Age: 55
End: 2025-08-20

## (undated) DEVICE — TUBING PMP L6FT CONT WAVE EXTN

## (undated) DEVICE — TUBING PMP L16FT MAIN DISP FOR AR-6400 AR-6475 Â€“ ORDER MULTIPLES OF 10 EACH

## (undated) DEVICE — PAD,ABDOMINAL,5"X9",ST,LF,25/BX: Brand: MEDLINE INDUSTRIES, INC.

## (undated) DEVICE — TOWEL,STOP FLAG GOLD N-W: Brand: MEDLINE

## (undated) DEVICE — PRECISION (9.0 X 0.51 X 25.0MM)

## (undated) DEVICE — BLADE SHV L13CM DIA5MM EXCALIBUR AGG COOLCUT

## (undated) DEVICE — GLOVE ORANGE PI 8   MSG9080

## (undated) DEVICE — AWL TAPERED 3.8MM DISPOSABLE: Brand: HEALICOIL

## (undated) DEVICE — THIN OFFSET (9.0 X 0.38 X 25.0MM)

## (undated) DEVICE — NEPTUNE E-SEP SMOKE EVACUATION PENCIL, COATED, 70MM BLADE, PUSH BUTTON SWITCH: Brand: NEPTUNE E-SEP

## (undated) DEVICE — SLING ARM L ABV 13IN DE-ROTATION STRP HOOKS PROVIDE IMMOB

## (undated) DEVICE — MAT FLR W32XL58IN

## (undated) DEVICE — UNDERGLOVE SURG SZ 8 BLU LTX FREE SYN POLYISOPRENE POLYMER

## (undated) DEVICE — ADHESIVE SKIN CLOSURE WND 8.661X1.5 IN 22 CM LIQUIBAND SECUR

## (undated) DEVICE — BUR SHAVER 5 MMX13 CM 8 FLUT OVL FOR AGGRESSIVE BNE COOLCUT

## (undated) DEVICE — NEEDLE SCORPION HD MEGA LOADER

## (undated) DEVICE — 3M™ STERI-DRAPE™ U-DRAPE 1067 1067 5/BX 4BX/CS/CTN&#X20;: Brand: STERI-DRAPE™

## (undated) DEVICE — CANNULA ARTHSCP L5CM ID8MM DBL DAM 1 PC MOLD LO PROF FLNG

## (undated) DEVICE — SYRINGE IRRIG 60ML SFT PLIABLE BLB EZ TO GRP 1 HND USE W/

## (undated) DEVICE — SYRINGE MED 30ML STD CLR PLAS LUERLOCK TIP N CTRL DISP

## (undated) DEVICE — SHOULDER STABILIZATION KIT,                                    DISPOSABLE 12 PER BOX

## (undated) DEVICE — SUTURE VICRYL + SZ 0 L18IN ABSRB UD L36MM CT-1 1/2 CIR VCP840D

## (undated) DEVICE — BLADE,CARBON-STEEL,15,STRL,DISPOSABLE,TB: Brand: MEDLINE

## (undated) DEVICE — SPONGE GZ W4XL8IN COT WVN 12 PLY

## (undated) DEVICE — ABLATOR ENDOSCOPIC ELECTROCAUTERY 90 DEG RF ASPIRATING STERILE DISPOSABLE APOLLORF I90

## (undated) DEVICE — GOWN,SIRUS,POLYRNF,BRTHSLV,XL,30/CS: Brand: MEDLINE

## (undated) DEVICE — DRAPE C-ARM 267CM X 152CM

## (undated) DEVICE — SUTURE ABSORBABLE MONOFILAMENT 1-0 CT1 27 IN VIO PDS + PDP341H

## (undated) DEVICE — MAT FLR FLD ASPIR DMND

## (undated) DEVICE — 3M™ IOBAN™ 2 ANTIMICROBIAL INCISE DRAPE 6640EZ: Brand: IOBAN™ 2

## (undated) DEVICE — CANNULA ARTHSCP L7CM DIA7MM TRNSLUC THRD FLX W/ NO SQUIRT

## (undated) DEVICE — SUTURE VICRYL + SZ 2-0 L18IN ABSRB UD CT1 L36MM 1/2 CIR VCP839D

## (undated) DEVICE — CARBIDE BUR, OVAL CUTTING, 8 FLUTES, MED., 4MM DIA.: Brand: MICROAIRE®

## (undated) DEVICE — TUBING FLD MGMT Y DBL SPIK DUALWAVE

## (undated) DEVICE — SHEET, ORTHO, SPLIT, STERILE: Brand: MEDLINE

## (undated) DEVICE — WAX SURG 2.5GM HEMSTAT BNE BEESWAX PARAFFIN ISO PALMITATE

## (undated) DEVICE — GOWN,REINFRCE,POLY,ECLIPSE,SLV,3XLG: Brand: MEDLINE

## (undated) DEVICE — CANNULA ARTHSCP L4CM DIA8MM PASSPRT BTTN

## (undated) DEVICE — SHOULDER ARTHROSCOPY: Brand: MEDLINE INDUSTRIES, INC.

## (undated) DEVICE — SUTURE MONOCRYL + SZ 4-0 L27IN ABSRB UD L19MM PS-2 3/8 CIR MCP426H